# Patient Record
Sex: FEMALE | Race: ASIAN | NOT HISPANIC OR LATINO | URBAN - METROPOLITAN AREA
[De-identification: names, ages, dates, MRNs, and addresses within clinical notes are randomized per-mention and may not be internally consistent; named-entity substitution may affect disease eponyms.]

---

## 2017-11-05 ENCOUNTER — EMERGENCY (EMERGENCY)
Facility: HOSPITAL | Age: 82
LOS: 1 days | Discharge: HOME | End: 2017-11-05
Attending: EMERGENCY MEDICINE | Admitting: EMERGENCY MEDICINE
Payer: MEDICARE

## 2017-11-05 VITALS
DIASTOLIC BLOOD PRESSURE: 76 MMHG | SYSTOLIC BLOOD PRESSURE: 147 MMHG | HEART RATE: 73 BPM | RESPIRATION RATE: 18 BRPM | OXYGEN SATURATION: 96 % | TEMPERATURE: 98 F

## 2017-11-05 DIAGNOSIS — H26.9 UNSPECIFIED CATARACT: Chronic | ICD-10-CM

## 2017-11-05 LAB
ALBUMIN SERPL ELPH-MCNC: 3.9 G/DL — SIGNIFICANT CHANGE UP (ref 3.3–5)
ALP SERPL-CCNC: 90 U/L — SIGNIFICANT CHANGE UP (ref 40–120)
ALT FLD-CCNC: 27 U/L RC — SIGNIFICANT CHANGE UP (ref 10–45)
ANION GAP SERPL CALC-SCNC: 11 MMOL/L — SIGNIFICANT CHANGE UP (ref 5–17)
APPEARANCE UR: CLEAR — SIGNIFICANT CHANGE UP
AST SERPL-CCNC: 34 U/L — SIGNIFICANT CHANGE UP (ref 10–40)
BASOPHILS # BLD AUTO: 0.1 K/UL — SIGNIFICANT CHANGE UP (ref 0–0.2)
BASOPHILS NFR BLD AUTO: 1 % — SIGNIFICANT CHANGE UP (ref 0–2)
BILIRUB SERPL-MCNC: 0.4 MG/DL — SIGNIFICANT CHANGE UP (ref 0.2–1.2)
BILIRUB UR-MCNC: NEGATIVE — SIGNIFICANT CHANGE UP
BUN SERPL-MCNC: 13 MG/DL — SIGNIFICANT CHANGE UP (ref 7–23)
CALCIUM SERPL-MCNC: 9.3 MG/DL — SIGNIFICANT CHANGE UP (ref 8.4–10.5)
CHLORIDE SERPL-SCNC: 104 MMOL/L — SIGNIFICANT CHANGE UP (ref 96–108)
CO2 SERPL-SCNC: 24 MMOL/L — SIGNIFICANT CHANGE UP (ref 22–31)
COLOR SPEC: COLORLESS — SIGNIFICANT CHANGE UP
CREAT SERPL-MCNC: 0.69 MG/DL — SIGNIFICANT CHANGE UP (ref 0.5–1.3)
DIFF PNL FLD: ABNORMAL
EOSINOPHIL # BLD AUTO: 0.3 K/UL — SIGNIFICANT CHANGE UP (ref 0–0.5)
EOSINOPHIL NFR BLD AUTO: 2 % — SIGNIFICANT CHANGE UP (ref 0–6)
GLUCOSE SERPL-MCNC: 101 MG/DL — HIGH (ref 70–99)
GLUCOSE UR QL: NEGATIVE — SIGNIFICANT CHANGE UP
HCT VFR BLD CALC: 36.6 % — SIGNIFICANT CHANGE UP (ref 34.5–45)
HGB BLD-MCNC: 12.6 G/DL — SIGNIFICANT CHANGE UP (ref 11.5–15.5)
KETONES UR-MCNC: NEGATIVE — SIGNIFICANT CHANGE UP
LEUKOCYTE ESTERASE UR-ACNC: ABNORMAL
LYMPHOCYTES # BLD AUTO: 5.2 K/UL — HIGH (ref 1–3.3)
LYMPHOCYTES # BLD AUTO: 59 % — HIGH (ref 13–44)
MAGNESIUM SERPL-MCNC: 1.7 MG/DL — SIGNIFICANT CHANGE UP (ref 1.6–2.6)
MCHC RBC-ENTMCNC: 30.7 PG — SIGNIFICANT CHANGE UP (ref 27–34)
MCHC RBC-ENTMCNC: 34.5 GM/DL — SIGNIFICANT CHANGE UP (ref 32–36)
MCV RBC AUTO: 88.9 FL — SIGNIFICANT CHANGE UP (ref 80–100)
MONOCYTES # BLD AUTO: 0.6 K/UL — SIGNIFICANT CHANGE UP (ref 0–0.9)
MONOCYTES NFR BLD AUTO: 8 % — SIGNIFICANT CHANGE UP (ref 2–14)
NEUTROPHILS # BLD AUTO: 3.2 K/UL — SIGNIFICANT CHANGE UP (ref 1.8–7.4)
NEUTROPHILS NFR BLD AUTO: 29 % — LOW (ref 43–77)
NITRITE UR-MCNC: NEGATIVE — SIGNIFICANT CHANGE UP
PH UR: 6 — SIGNIFICANT CHANGE UP (ref 5–8)
PHOSPHATE SERPL-MCNC: 3.3 MG/DL — SIGNIFICANT CHANGE UP (ref 2.5–4.5)
PLATELET # BLD AUTO: 58 K/UL — LOW (ref 150–400)
POTASSIUM SERPL-MCNC: 4 MMOL/L — SIGNIFICANT CHANGE UP (ref 3.5–5.3)
POTASSIUM SERPL-SCNC: 4 MMOL/L — SIGNIFICANT CHANGE UP (ref 3.5–5.3)
PROT SERPL-MCNC: 7.7 G/DL — SIGNIFICANT CHANGE UP (ref 6–8.3)
PROT UR-MCNC: NEGATIVE — SIGNIFICANT CHANGE UP
RBC # BLD: 4.11 M/UL — SIGNIFICANT CHANGE UP (ref 3.8–5.2)
RBC # FLD: 12.8 % — SIGNIFICANT CHANGE UP (ref 10.3–14.5)
SODIUM SERPL-SCNC: 139 MMOL/L — SIGNIFICANT CHANGE UP (ref 135–145)
SP GR SPEC: <1.005 — LOW (ref 1.01–1.02)
TROPONIN T SERPL-MCNC: <0.01 NG/ML — SIGNIFICANT CHANGE UP (ref 0–0.06)
UROBILINOGEN FLD QL: NEGATIVE — SIGNIFICANT CHANGE UP
WBC # BLD: 9.3 K/UL — SIGNIFICANT CHANGE UP (ref 3.8–10.5)
WBC # FLD AUTO: 9.3 K/UL — SIGNIFICANT CHANGE UP (ref 3.8–10.5)

## 2017-11-05 PROCEDURE — 93010 ELECTROCARDIOGRAM REPORT: CPT

## 2017-11-05 PROCEDURE — 99220: CPT | Mod: 25

## 2017-11-05 PROCEDURE — 70450 CT HEAD/BRAIN W/O DYE: CPT | Mod: 26

## 2017-11-05 RX ORDER — SODIUM CHLORIDE 9 MG/ML
3 INJECTION INTRAMUSCULAR; INTRAVENOUS; SUBCUTANEOUS EVERY 8 HOURS
Qty: 0 | Refills: 0 | Status: DISCONTINUED | OUTPATIENT
Start: 2017-11-05 | End: 2017-11-11

## 2017-11-05 RX ORDER — SODIUM CHLORIDE 9 MG/ML
1000 INJECTION INTRAMUSCULAR; INTRAVENOUS; SUBCUTANEOUS ONCE
Qty: 0 | Refills: 0 | Status: COMPLETED | OUTPATIENT
Start: 2017-11-05 | End: 2017-11-05

## 2017-11-05 RX ORDER — ASPIRIN/CALCIUM CARB/MAGNESIUM 324 MG
324 TABLET ORAL ONCE
Qty: 0 | Refills: 0 | Status: DISCONTINUED | OUTPATIENT
Start: 2017-11-05 | End: 2017-11-05

## 2017-11-05 RX ORDER — ACETAMINOPHEN 500 MG
1000 TABLET ORAL ONCE
Qty: 0 | Refills: 0 | Status: COMPLETED | OUTPATIENT
Start: 2017-11-05 | End: 2017-11-05

## 2017-11-05 RX ORDER — LABETALOL HCL 100 MG
200 TABLET ORAL ONCE
Qty: 0 | Refills: 0 | Status: COMPLETED | OUTPATIENT
Start: 2017-11-05 | End: 2017-11-05

## 2017-11-05 RX ORDER — METOCLOPRAMIDE HCL 10 MG
10 TABLET ORAL ONCE
Qty: 0 | Refills: 0 | Status: COMPLETED | OUTPATIENT
Start: 2017-11-05 | End: 2017-11-05

## 2017-11-05 RX ADMIN — Medication 10 MILLIGRAM(S): at 17:23

## 2017-11-05 RX ADMIN — Medication 400 MILLIGRAM(S): at 17:23

## 2017-11-05 RX ADMIN — Medication 1000 MILLIGRAM(S): at 17:23

## 2017-11-05 RX ADMIN — SODIUM CHLORIDE 3 MILLILITER(S): 9 INJECTION INTRAMUSCULAR; INTRAVENOUS; SUBCUTANEOUS at 22:24

## 2017-11-05 RX ADMIN — SODIUM CHLORIDE 1000 MILLILITER(S): 9 INJECTION INTRAMUSCULAR; INTRAVENOUS; SUBCUTANEOUS at 16:19

## 2017-11-05 NOTE — ED PROVIDER NOTE - PROGRESS NOTE DETAILS
Yareli R2: CTH no acute findings.  Will likely order MRI and monitor pt in CDU.  Paged Neuro, awaiting call back. Yareli R2: Paged Neuro x3.  Still awaiting call back.  Spoke to CDU JIM Mosqueda, who will sign pt out to night JIM Tristan.  Pt to be taken to CDU for monitoring pending Neuro returning call. Yareli R2: Spoke to Neuro, will see patient.  MRI brain ordered.

## 2017-11-05 NOTE — ED CDU PROVIDER INITIAL DAY NOTE - OTHER FINDINGS
L anterior fascicular block, minimal voltage criteria for LVH may be normal variant, cannot r/o ant infarct, age undetermied

## 2017-11-05 NOTE — ED PROVIDER NOTE - PHYSICAL EXAMINATION
T(C): 36.4 (11-05-17 @ 14:47), Max: 36.4 (11-05-17 @ 14:47)  HR: 73 (11-05-17 @ 14:47) (73 - 73)  BP: 147/76 (11-05-17 @ 14:47) (147/76 - 147/76)  RR: 18 (11-05-17 @ 14:47) (18 - 18)  SpO2: 96% (11-05-17 @ 14:47) (96% - 96%)    Gen: awake, alert  HENT: neck soft / supple; MMM  Lymph: no LAD noted in neck  Eye: pupils constricted but reactive, sclerae anicteric  CV: normal rate, regular rhythm, crescendo-decrescendo systolic murmur noted  Pulm: CTAB, no w/r/r auscultated  Abd: +BS, soft, NT, ND  Skin: warm, dry  Ext: no LE edema  Neuro: CN II-XII intact, answering questions appropriately, following commands appropriately, recent and remote memory intact  Psych: normal mood / affect T(C): 36.4 (11-05-17 @ 14:47), Max: 36.4 (11-05-17 @ 14:47)  HR: 73 (11-05-17 @ 14:47) (73 - 73)  BP: 147/76 (11-05-17 @ 14:47) (147/76 - 147/76)  RR: 18 (11-05-17 @ 14:47) (18 - 18)  SpO2: 96% (11-05-17 @ 14:47) (96% - 96%)    Gen: awake, alert  HENT: neck soft / supple; MMM  Lymph: no LAD noted in neck  Eye: pupils constricted but reactive, sclerae anicteric  CV: normal rate, regular rhythm, crescendo-decrescendo systolic murmur noted  Pulm: CTAB, no w/r/r auscultated  Abd: +BS, soft, NT, ND  Skin: warm, dry  Ext: no LE edema  Neuro: CN II-XII intact, no nystagmus, answering questions appropriately, following commands appropriately, recent and remote memory intact  Psych: normal mood / affect

## 2017-11-05 NOTE — ED CDU PROVIDER INITIAL DAY NOTE - PROGRESS NOTE DETAILS
pt c/o chest discomfort, cannot describe pain, it is on the L side. states discomfort may be from the cardiac monitor and wants to know if it can come off. pt still has bra and clothes on over the monitor. NAD, VSS. CV: S1S2 RRR, Lungs: CTA b/l, +ttp of L chest. had pt change into gown, adjust monitor, do EKG and trop. after changing pt states pain resolved, doesn't want medication as she is feeling better. will continue to monitor. family present agree her discomfort is from the monitor and not the heart. will continue monitoring. -JIM Stephenson

## 2017-11-05 NOTE — CONSULT NOTE ADULT - SUBJECTIVE AND OBJECTIVE BOX
HPI:  The patient is a 83yo F with h/o HTN, cervical spinal stenosis with BL hand numbness, arthritis, here for positional x 1 week, has Hx of vertigo in the past. pt accompanied by her son, who provided translation.  Five weeks ago, the patient had a barium swallow test, during which time the patient had her first episode of vertigo.  Since then, the patient has had episodes of vertigo, particularly when she is standing or moving her head to different directions.  The episodes come and go but are becoming more frequent, per son.  She visited her PCP who prescribed her Meclizine, which was helping her at the begining. The patient also reports a headache that moves around her head.  The patient was taken to urgent care today and was negative for flu.  Denies any slurred speech, changes in vision, changes in her balance.    MEDICATIONS  (STANDING):  labetalol 200 milliGRAM(s) Oral once  sodium chloride 0.9% lock flush 3 milliLiter(s) IV Push every 8 hours    MEDICATIONS  (PRN):    PAST MEDICAL & SURGICAL HISTORY:  Arthritis  Spinal stenosis  Hypertension  Bilateral cataracts: 2012    FAMILY HISTORY:  Family history of heart attack (Mother)  Family history of stroke (Father, Sibling)    Allergies    Allergy Status Unknown    Intolerances    SHx - No smoking, No ETOH, No drug abuse    Review of Systems:  CONSTITUTIONAL:  No weight loss, fever, chills, weakness or fatigue.  HEENT:  Eyes:  No visual loss, blurred vision, double vision or yellow sclerae. Ears, Nose, Throat:  No hearing loss, sneezing, congestion, runny nose or sore throat.  CARDIOVASCULAR:  No chest pain, chest pressure or chest discomfort. No palpitations or edema.  GASTROINTESTINAL:  No anorexia, nausea, vomiting or diarrhea. No abdominal pain or blood.  NEUROLOGICAL: See HPI  MUSCULOSKELETAL:  No muscle, back pain, joint pain or stiffness.  PSYCHIATRIC:  No history of depression or anxiety.      Vital Signs Last 24 Hrs  T(C): 36.5 (05 Nov 2017 20:57), Max: 37.1 (05 Nov 2017 18:36)  T(F): 97.7 (05 Nov 2017 20:57), Max: 98.7 (05 Nov 2017 18:36)  HR: 64 (05 Nov 2017 20:57) (64 - 73)  BP: 158/71 (05 Nov 2017 20:57) (140/71 - 163/77)  BP(mean): --  RR: 16 (05 Nov 2017 20:57) (16 - 18)  SpO2: 97% (05 Nov 2017 20:57) (96% - 97%)    General Exam:   General appearance: No acute distress                   Neurological Exam:  Mental Status: Orientated to self, date and place.    No dysarthria, aphasia or neglect.      Cranial Nerves: CN I - not tested.  PERRL, EOMI, VFF, no nystagmus or diplopia.  No APD.    Fundi not visualized bilaterally.    No facial asymmetry.  Hearing intact to finger rub bilaterally.     Motor:   Tone: normal.                  Strength:     [] Upper extremity                      Delt       Bicep    Tricep                                                           R         5/5        5/5        5/5       5/5                                                L          5/5        5/5        5/5       5/5  [] Lower extremity                       HF          KE          KF        DF         PF                                               R        5/5        5/5        5/5       5/5       5/5                                               L         5/5        5/5       5/5       5/5        5/5  Pronator drift: none                 Dysmetria: BL NL FTN  No truncal ataxia.    Tremor: No resting, postural or action tremor.  No myoclonus.    Sensation: intact to light touch, pinprick, vibration and proprioception    Deep Tendon Reflexes:   Right 2+ : BC, TC, BRD   Left 2+ : BC, TC, BRD  Right 2+ Knee, 1+ ankle  Left 2+ Knee, 1+ ankle    Toes flexor bilaterally  Gait: normal and stable.      Other:    11-05    139  |  104  |  13  ----------------------------<  101<H>  4.0   |  24  |  0.69    Ca    9.3      05 Nov 2017 16:04  Phos  3.3     11-05  Mg     1.7     11-05    TPro  7.7  /  Alb  3.9  /  TBili  0.4  /  DBili  x   /  AST  34  /  ALT  27  /  AlkPhos  90  11-05                          12.6   9.3   )-----------( 58       ( 05 Nov 2017 16:04 )             36.6       Radiology  < from: CT Head No Cont (11.05.17 @ 17:01) >  IMPRESSION:   No CT evidence of acute intracranial hemorrhage, mass or infarct.

## 2017-11-05 NOTE — ED PROVIDER NOTE - OBJECTIVE STATEMENT
82F  here for dizziness. 82F h/o HTN, arthritis here for dizziness.    5 weeks ago, the patient had a barium swallow test, during which time the patient had vertigo.  Since then, the patient has had episodes of vertigo.  The patient saw her PMD, who gave her meclizine.  The patient reports a headache that moves around her head.  The patient was taken to urgent care today and was negative for flu.  The patient has also been taking cinnarizine.  The patient fell off of her sofa last week and fall on her left side.  Denies changes in vision or hearing, fever, chills, abdominal pain, chest pain, SOB, dysuria, N/V/C.  She has chronic diarrhea when she eats.    PMD: Kenna Felipe 82-yo Malay-speaking F h/o HTN, arthritis here for dizziness.    Five weeks ago, the patient had a barium swallow test, during which time the patient had vertigo.  Since then, the patient has had episodes of vertigo when she is standing.  The episodes come and go but are becoming more frequent, per son.  The patient reports a headache that moves around her head.  The patient was taken to urgent care today and was negative for flu.  The patient has also been taking cinnarizine.  The patient fell off of her sofa last week and fall on her left side, denies LOC or head trauma.  Denies changes in vision or hearing, fever, chills, abdominal pain, chest pain, SOB, dysuria, hematuria, N/V/C.  She has chronic diarrhea when she eats and is undergoing workup with her gastroenterologist re: this.  The patient saw her PMD, who gave her meclizine.  She states that the meclizine initially helped but now is no longer helping.    PMD: Kenna Felipe 82-yo Japanese-speaking F h/o HTN, arthritis here for dizziness.    Patient is Japanese-speaking.  She is accompanied by her son, who provided translation.  Five weeks ago, the patient had a barium swallow test, during which time the patient had her first episode of vertigo.  Since then, the patient has had episodes of vertigo, particularly when she is standing.  The episodes come and go but are becoming more frequent, per son.  The patient reports a headache that moves around her head.  The patient was taken to urgent care today and was negative for flu.  The patient has also been taking cinnarizine.  The patient fell off of her sofa last week and fall on her left side, denies LOC or head trauma.  Denies changes in vision or hearing, fever, chills, abdominal pain, chest pain, SOB, dysuria, hematuria, N/V/C.  She has chronic diarrhea when she eats and is undergoing workup with her gastroenterologist re: this.  The patient saw her PMD, who gave her meclizine.  She states that the meclizine initially helped but now is no longer helping.    PMD: Kenna Felipe

## 2017-11-05 NOTE — ED ADULT NURSE REASSESSMENT NOTE - NS ED NURSE REASSESS COMMENT FT1
Pt ambulated to bathroom without difficulty, denies chest pain at this time. Family at bedside. Will monitor and assess.

## 2017-11-05 NOTE — ED CDU PROVIDER INITIAL DAY NOTE - DETAILS
- frequent re-eval  - vitals q 4hrs  - tele  - neurochecks q 4hrs  - MRI head  - neuro following  - case discussed with attending Dr. Uribe (covering for Dr. Pratt)

## 2017-11-05 NOTE — ED ADULT NURSE NOTE - OBJECTIVE STATEMENT
82 yr old female with a h/o vertigo came in with dizziness and headache, states meclizine is not working. on assessment a and o x 3 lungs clear abd soft non thender no swelling in extremities no n/v/d/ no fevers/ pt also recently has the flu last week

## 2017-11-05 NOTE — ED ADULT NURSE REASSESSMENT NOTE - NS ED NURSE REASSESS COMMENT FT1
Report received from Marcie Dietz RN. Patient baseline mental status, son at bedside translatingkatherine  service. Vital signs performed. Patient waiting to take pm dose of labetalol with food. Report given to Nilsa BONE in CDU. Report received from Marcie Dietz RN. Patient baseline mental status, son at bedside translatingkatherine  service. Vital signs performed. Patient waiting to take pm dose of labetalol with food. Report given to Nilsa BONE in CDU. Pending MRI

## 2017-11-05 NOTE — ED PROVIDER NOTE - MEDICAL DECISION MAKING DETAILS
Yareli R2: 82F hx as above here with dizziness, no LOC, no trauma.  Will check electrolytes.  Consider imaging. Yareli R2: 82F hx as above here with dizziness, no LOC, no trauma.  Will check electrolytes.  Consider imaging.  Terence: Patient with vertigo x 5 weeks, episodic and worse with head movement lasting a few seconds. States occurring more frequently. Patient reports mild headache.  on meclizine and states minimal relief. No prior episodes. no focal deficits, finger to nose intact, ambulating with no difficulty, will get labs, ct head, valium, reassess for possible MRI/cdu.

## 2017-11-05 NOTE — ED CDU PROVIDER INITIAL DAY NOTE - OBJECTIVE STATEMENT
81yo Latvian-speaking F h/o HTN, spinal stenosis, arthritis, here for dizziness x 1 week. pt accompanied by her son, who provided translation.  Five weeks ago, the patient had a barium swallow test, during which time the patient had her first episode of vertigo.  Since then, the patient has had episodes of vertigo, particularly when she is standing.  The episodes come and go but are becoming more frequent, per son.  The patient reports a headache that moves around her head.  The patient was taken to urgent care today and was negative for flu.  The patient has also been taking cinnarizine.  The patient fell off of her sofa last week and fall on her left side, denies LOC or head trauma.  Denies changes in vision or hearing, fever, chills, abdominal pain, chest pain, SOB, dysuria, hematuria, N/V/C.  denies weakness, numbness, H/A, slurred speech. She has chronic diarrhea when she eats and is undergoing workup with her gastroenterologist re: this.  The patient saw her PMD, who gave her meclizine.  She states that the meclizine initially helped but now is no longer helping.    PMD: Kenna Felipe

## 2017-11-05 NOTE — ED CDU PROVIDER INITIAL DAY NOTE - ATTENDING CONTRIBUTION TO CARE
I have personally performed a face to face diagnostic evaluation on this patient.  I have reviewed the ACP note and agree with the history, exam, and plan of care, except as noted.  History and Exam by me shows  see ER note for details.

## 2017-11-05 NOTE — CONSULT NOTE ADULT - ASSESSMENT
The patient is a 83yo F with h/o HTN, cervical spinal stenosis with BL hand numbness, arthritis, here for positional x 5 week. Visited her PCP who prescribed her Meclizine which intially helped her, however from a week ago her vertigo is restarted. Precipitates by head and eye movement. She also describes a holocephalic headache for the past week. Exam is neurologically intact except BL UE radiculopathy.  Impression: Positional vertigo is most likely the diagnosis, the patient is already scheduled to stay in CDU for MRI, will follow up this am with neurology attending  Plan:  [] MRI H non contrast  [] MRA H&N  [] D/W Dr. Fam

## 2017-11-05 NOTE — ED ADULT NURSE REASSESSMENT NOTE - NS ED NURSE REASSESS COMMENT FT1
Pt report received from Anusha BONE. Pt transferred to cdu for observation for dizziness, increased with head movement. Pt Sinhala speaking,  at the bedside translating. Pt denies n/v/d, states she hasn't eaten all day and requests a meal. Tele monitor in place HR 67 bpm NSR. Pt denies dizziness at this time, VSS, afebrile. Awaiting MRI. Aware of plan of care, safety maintained. Pt report received from Anusha BONE. Pt transferred to cdu for observation for dizziness, increased with head movement. Pt Korean speaking,  at the bedside translating. Pt denies chest pain, palpitations, sob, dyspnea no n/v/d, states she hasn't eaten all day and requests a meal.  is getting Halal/kosher meal for her. Tele monitor in place HR 67 bpm NSR. Pt denies dizziness at this time, VSS, afebrile. Awaiting MRI. Aware of plan of care, safety maintained.

## 2017-11-05 NOTE — ED ADULT NURSE REASSESSMENT NOTE - NS ED NURSE REASSESS COMMENT FT1
Pt c/o 5/10 chest pain left sided, and headache. As per family [facility] leads are uncomfortable. Requested female nurse to perform ekg, Ed tech sent. Will monitor and assess.

## 2017-11-06 VITALS
RESPIRATION RATE: 20 BRPM | HEART RATE: 72 BPM | OXYGEN SATURATION: 98 % | TEMPERATURE: 98 F | DIASTOLIC BLOOD PRESSURE: 68 MMHG | SYSTOLIC BLOOD PRESSURE: 177 MMHG

## 2017-11-06 PROBLEM — Z00.00 ENCOUNTER FOR PREVENTIVE HEALTH EXAMINATION: Status: ACTIVE | Noted: 2017-11-06

## 2017-11-06 LAB
CHOLEST SERPL-MCNC: 167 MG/DL — SIGNIFICANT CHANGE UP (ref 10–199)
CULTURE RESULTS: NO GROWTH — SIGNIFICANT CHANGE UP
HBA1C BLD-MCNC: 6 % — HIGH (ref 4–5.6)
HDLC SERPL-MCNC: 26 MG/DL — LOW (ref 40–125)
LIPID PNL WITH DIRECT LDL SERPL: 99 MG/DL — SIGNIFICANT CHANGE UP
SPECIMEN SOURCE: SIGNIFICANT CHANGE UP
TOTAL CHOLESTEROL/HDL RATIO MEASUREMENT: 6.4 RATIO — SIGNIFICANT CHANGE UP (ref 3.3–7.1)
TRIGL SERPL-MCNC: 212 MG/DL — HIGH (ref 10–149)
TROPONIN T SERPL-MCNC: <0.01 NG/ML — SIGNIFICANT CHANGE UP (ref 0–0.06)

## 2017-11-06 PROCEDURE — 84484 ASSAY OF TROPONIN QUANT: CPT

## 2017-11-06 PROCEDURE — 70544 MR ANGIOGRAPHY HEAD W/O DYE: CPT

## 2017-11-06 PROCEDURE — 84100 ASSAY OF PHOSPHORUS: CPT

## 2017-11-06 PROCEDURE — 93010 ELECTROCARDIOGRAM REPORT: CPT | Mod: 76

## 2017-11-06 PROCEDURE — 96375 TX/PRO/DX INJ NEW DRUG ADDON: CPT | Mod: XU

## 2017-11-06 PROCEDURE — 99284 EMERGENCY DEPT VISIT MOD MDM: CPT | Mod: 25

## 2017-11-06 PROCEDURE — A9585: CPT

## 2017-11-06 PROCEDURE — 70544 MR ANGIOGRAPHY HEAD W/O DYE: CPT | Mod: 26,59

## 2017-11-06 PROCEDURE — 83735 ASSAY OF MAGNESIUM: CPT

## 2017-11-06 PROCEDURE — 70549 MR ANGIOGRAPH NECK W/O&W/DYE: CPT | Mod: 26

## 2017-11-06 PROCEDURE — 93005 ELECTROCARDIOGRAM TRACING: CPT

## 2017-11-06 PROCEDURE — 80053 COMPREHEN METABOLIC PANEL: CPT

## 2017-11-06 PROCEDURE — 87086 URINE CULTURE/COLONY COUNT: CPT

## 2017-11-06 PROCEDURE — 83036 HEMOGLOBIN GLYCOSYLATED A1C: CPT

## 2017-11-06 PROCEDURE — 85027 COMPLETE CBC AUTOMATED: CPT

## 2017-11-06 PROCEDURE — G0378: CPT

## 2017-11-06 PROCEDURE — 70548 MR ANGIOGRAPHY NECK W/DYE: CPT

## 2017-11-06 PROCEDURE — 70450 CT HEAD/BRAIN W/O DYE: CPT

## 2017-11-06 PROCEDURE — 70551 MRI BRAIN STEM W/O DYE: CPT | Mod: 26

## 2017-11-06 PROCEDURE — 70551 MRI BRAIN STEM W/O DYE: CPT

## 2017-11-06 PROCEDURE — 80061 LIPID PANEL: CPT

## 2017-11-06 PROCEDURE — 99217: CPT | Mod: 25

## 2017-11-06 PROCEDURE — 81001 URINALYSIS AUTO W/SCOPE: CPT

## 2017-11-06 PROCEDURE — 96374 THER/PROPH/DIAG INJ IV PUSH: CPT

## 2017-11-06 RX ORDER — LOSARTAN POTASSIUM 100 MG/1
100 TABLET, FILM COATED ORAL DAILY
Qty: 0 | Refills: 0 | Status: DISCONTINUED | OUTPATIENT
Start: 2017-11-06 | End: 2017-11-11

## 2017-11-06 RX ORDER — MECLIZINE HCL 12.5 MG
1 TABLET ORAL
Qty: 10 | Refills: 0 | OUTPATIENT
Start: 2017-11-06 | End: 2017-11-11

## 2017-11-06 RX ORDER — ASPIRIN/CALCIUM CARB/MAGNESIUM 324 MG
81 TABLET ORAL DAILY
Qty: 0 | Refills: 0 | Status: DISCONTINUED | OUTPATIENT
Start: 2017-11-06 | End: 2017-11-11

## 2017-11-06 RX ADMIN — LOSARTAN POTASSIUM 100 MILLIGRAM(S): 100 TABLET, FILM COATED ORAL at 06:03

## 2017-11-06 RX ADMIN — SODIUM CHLORIDE 3 MILLILITER(S): 9 INJECTION INTRAMUSCULAR; INTRAVENOUS; SUBCUTANEOUS at 05:20

## 2017-11-06 RX ADMIN — Medication 81 MILLIGRAM(S): at 06:36

## 2017-11-06 NOTE — ED CDU PROVIDER SUBSEQUENT DAY NOTE - ATTENDING CONTRIBUTION TO CARE
Attending MD Clay:   I personally have seen and examined this patient.  Physician assistant note reviewed and agree on plan of care and except where noted.  See HPI, PE, and MDM for details.

## 2017-11-06 NOTE — ED CDU PROVIDER SUBSEQUENT DAY NOTE - PROGRESS NOTE DETAILS
CDU progress note JIM Stephenson: Pt previously reported episode of vertigo while looking down, then it resolved. Patient sleeping comfortably now. NAD. VSS. no events on telemetry. Patient resting comfortably in bed, NAD, VSS, no events on tele, symptoms stable. Currently being transported off unit to MRI. Karlo Gomez PA-C. Patient feeling improved, NAD, VSS, no events on tele. MRI demonstrates no acute abnormalities, neurology aware of results, patient was seen by attending. Stable for discharge home, discussed w/ Dr. Clay. Karlo Gomez PA-C. stable for DC home, MRI wnl, cleared for DC home by neurology

## 2017-11-06 NOTE — ED CDU PROVIDER DISPOSITION NOTE - CLINICAL COURSE
83yo Occitan-speaking F h/o HTN, spinal stenosis, arthritis, here for dizziness x 1 week. Five weeks ago, the patient had a barium swallow test, during which time the patient had her first episode of vertigo.  Since then, the patient has had episodes of vertigo, particularly when she is standing.  The episodes come and go but are becoming more frequent, per son.  The patient reports a headache that moves around her head.  The patient was taken to urgent care today and was negative for flu. The patient fell off of her sofa last week and fall on her left side, denies LOC or head trauma. The patient saw her PMD, who gave her meclizine. The patient has also been taking cinnarizine. She states that the meclizine initially helped but now is no longer helping. neurology evaluated pt, likely benign positional vertigo, but want to r/o stroke, recommend MRI/MRA head and neck. Pt also c/o intermittent CP 2/2 telemetry monitor. Radha negative. MRI/MRA _____. Attending MD Clay: I have personally performed a face to face diagnostic evaluation on this patient.  I have reviewed the ACP note and agree with the history, exam, and plan of care, except as noted.      82F observed overnight for vertigo, rule out central etiology of vertigo. MR brain revealed no acute infarct, neurology service evaluated patient and agreed vertigo likely peripheral in nature. ACS ruled out with cardiac biomarkers x 2. Patient stable for discharge with PMD and outpatient neuro follow up

## 2017-11-06 NOTE — ED CDU PROVIDER DISPOSITION NOTE - PLAN OF CARE
1. Continue your home medications as directed. Start Aspirin 81mg daily until further evaluation. Start Atorvastatin 40mg daily.   2. Drink plenty of fluids to stay hydrated.  3. You will need to follow up with your PMD and our neurology clinic at [phone] in 2-3 days. Recommend follow up with cardiology [phone]. A copy of your results were given to you to bring to your appt.   4. Return to ER for headache, confusion, behavior/speech changes, numbness/tingling/weakness in your arms/legs, or any other concerns. 1. Continue your home medications as directed.   2. Drink plenty of fluids to stay hydrated.  3. You will need to follow up with your PMD and our neurology clinic at [phone] in 2-3 days. Recommend follow up with cardiology [phone]. A copy of your results were given to you to bring to your appt.   4. Return to ER for headache, confusion, behavior/speech changes, numbness/tingling/weakness in your arms/legs, or any other concerns.

## 2017-11-06 NOTE — ED CDU PROVIDER SUBSEQUENT DAY NOTE - HISTORY
No interval changes since initial CDU provider note. Pt resting comfortably, sleeping but after pt was woken up for exam she c/o mild L CP again, states it is from the monitor again, declined medication for pain again. previous trop negative. NAD VSS. no events on tele. CV: S1S2 RRR, Lungs: CTA b/l, neuro: no deficits. pt due for another trop and EKG at 0400. will continue monitoring - JIM Stephenson

## 2017-11-06 NOTE — ED CDU PROVIDER SUBSEQUENT DAY NOTE - FAMILY HISTORY
Father  Still living? Unknown  Family history of stroke, Age at diagnosis: Age Unknown     Sibling  Still living? Unknown  Family history of stroke, Age at diagnosis: Age Unknown     Mother  Still living? Unknown  Family history of heart attack, Age at diagnosis: Age Unknown

## 2017-12-09 ENCOUNTER — APPOINTMENT (OUTPATIENT)
Dept: OTOLARYNGOLOGY | Facility: CLINIC | Age: 82
End: 2017-12-09

## 2018-02-05 ENCOUNTER — APPOINTMENT (OUTPATIENT)
Dept: GASTROENTEROLOGY | Facility: CLINIC | Age: 83
End: 2018-02-05
Payer: MEDICARE

## 2018-02-05 DIAGNOSIS — Z87.39 PERSONAL HISTORY OF OTHER DISEASES OF THE MUSCULOSKELETAL SYSTEM AND CONNECTIVE TISSUE: ICD-10-CM

## 2018-02-05 DIAGNOSIS — K80.20 CALCULUS OF GALLBLADDER W/OUT CHOLECYSTITIS W/OUT OBSTRUCTION: ICD-10-CM

## 2018-02-05 DIAGNOSIS — R10.13 EPIGASTRIC PAIN: ICD-10-CM

## 2018-02-05 DIAGNOSIS — R11.0 NAUSEA: ICD-10-CM

## 2018-02-05 DIAGNOSIS — Z82.3 FAMILY HISTORY OF STROKE: ICD-10-CM

## 2018-02-05 DIAGNOSIS — Z78.9 OTHER SPECIFIED HEALTH STATUS: ICD-10-CM

## 2018-02-05 DIAGNOSIS — Z87.898 PERSONAL HISTORY OF OTHER SPECIFIED CONDITIONS: ICD-10-CM

## 2018-02-05 DIAGNOSIS — K52.9 NONINFECTIVE GASTROENTERITIS AND COLITIS, UNSPECIFIED: ICD-10-CM

## 2018-02-05 DIAGNOSIS — R13.10 DYSPHAGIA, UNSPECIFIED: ICD-10-CM

## 2018-02-05 DIAGNOSIS — Z86.79 PERSONAL HISTORY OF OTHER DISEASES OF THE CIRCULATORY SYSTEM: ICD-10-CM

## 2018-02-05 PROCEDURE — 99204 OFFICE O/P NEW MOD 45 MIN: CPT

## 2018-02-05 RX ORDER — OMEPRAZOLE 40 MG/1
40 CAPSULE, DELAYED RELEASE ORAL
Qty: 30 | Refills: 3 | Status: ACTIVE | COMMUNITY
Start: 2018-02-05 | End: 1900-01-01

## 2018-02-06 PROBLEM — Z78.9 DOES NOT USE TOBACCO: Status: ACTIVE | Noted: 2018-02-06

## 2018-02-06 PROBLEM — Z87.898 HISTORY OF VERTIGO: Status: RESOLVED | Noted: 2018-02-06 | Resolved: 2018-02-06

## 2018-02-06 PROBLEM — Z87.39 HISTORY OF ARTHRITIS: Status: RESOLVED | Noted: 2018-02-06 | Resolved: 2018-02-06

## 2018-02-06 PROBLEM — Z86.79 HISTORY OF HYPERTENSION: Status: RESOLVED | Noted: 2018-02-06 | Resolved: 2018-02-06

## 2018-02-06 PROBLEM — R13.10 DYSPHAGIA: Status: ACTIVE | Noted: 2018-02-06

## 2018-02-06 PROBLEM — R11.0 NAUSEA: Status: ACTIVE | Noted: 2018-02-05

## 2018-02-06 PROBLEM — K52.9 CHRONIC DIARRHEA: Status: ACTIVE | Noted: 2018-02-05

## 2018-02-06 PROBLEM — Z82.3 FAMILY HISTORY OF CEREBROVASCULAR ACCIDENT (CVA): Status: ACTIVE | Noted: 2018-02-06

## 2018-02-06 PROBLEM — K80.20 CHOLELITHIASIS: Status: ACTIVE | Noted: 2018-02-06

## 2018-02-06 RX ORDER — VALSARTAN 160 MG/1
160 TABLET, COATED ORAL
Qty: 180 | Refills: 0 | Status: ACTIVE | COMMUNITY
Start: 2017-11-09

## 2018-02-06 RX ORDER — LABETALOL HYDROCHLORIDE 200 MG/1
200 TABLET, FILM COATED ORAL
Qty: 270 | Refills: 0 | Status: ACTIVE | COMMUNITY
Start: 2017-08-10

## 2018-02-12 ENCOUNTER — APPOINTMENT (OUTPATIENT)
Dept: RADIOLOGY | Facility: HOSPITAL | Age: 83
End: 2018-02-12

## 2018-04-19 ENCOUNTER — INPATIENT (INPATIENT)
Facility: HOSPITAL | Age: 83
LOS: 1 days | Discharge: HOME | DRG: 871 | End: 2018-04-21
Attending: INTERNAL MEDICINE | Admitting: INTERNAL MEDICINE
Payer: MEDICARE

## 2018-04-19 VITALS
DIASTOLIC BLOOD PRESSURE: 90 MMHG | HEART RATE: 88 BPM | RESPIRATION RATE: 33 BRPM | SYSTOLIC BLOOD PRESSURE: 116 MMHG | OXYGEN SATURATION: 94 %

## 2018-04-19 DIAGNOSIS — I10 ESSENTIAL (PRIMARY) HYPERTENSION: ICD-10-CM

## 2018-04-19 DIAGNOSIS — A41.9 SEPSIS, UNSPECIFIED ORGANISM: ICD-10-CM

## 2018-04-19 DIAGNOSIS — J12.9 VIRAL PNEUMONIA, UNSPECIFIED: ICD-10-CM

## 2018-04-19 DIAGNOSIS — R10.84 GENERALIZED ABDOMINAL PAIN: ICD-10-CM

## 2018-04-19 DIAGNOSIS — J96.00 ACUTE RESPIRATORY FAILURE, UNSPECIFIED WHETHER WITH HYPOXIA OR HYPERCAPNIA: ICD-10-CM

## 2018-04-19 DIAGNOSIS — D69.6 THROMBOCYTOPENIA, UNSPECIFIED: ICD-10-CM

## 2018-04-19 DIAGNOSIS — Z29.9 ENCOUNTER FOR PROPHYLACTIC MEASURES, UNSPECIFIED: ICD-10-CM

## 2018-04-19 DIAGNOSIS — R06.09 OTHER FORMS OF DYSPNEA: ICD-10-CM

## 2018-04-19 DIAGNOSIS — H26.9 UNSPECIFIED CATARACT: Chronic | ICD-10-CM

## 2018-04-19 LAB
ALBUMIN SERPL ELPH-MCNC: 3.7 G/DL — SIGNIFICANT CHANGE UP (ref 3.3–5)
ALP SERPL-CCNC: 102 U/L — SIGNIFICANT CHANGE UP (ref 40–120)
ALT FLD-CCNC: 21 U/L — SIGNIFICANT CHANGE UP (ref 10–45)
ANION GAP SERPL CALC-SCNC: 15 MMOL/L — SIGNIFICANT CHANGE UP (ref 5–17)
APPEARANCE UR: CLEAR — SIGNIFICANT CHANGE UP
APTT BLD: 23.2 SEC — LOW (ref 27.5–37.4)
AST SERPL-CCNC: 33 U/L — SIGNIFICANT CHANGE UP (ref 10–40)
BASE EXCESS BLDV CALC-SCNC: 1.9 MMOL/L — SIGNIFICANT CHANGE UP (ref -2–2)
BASOPHILS # BLD AUTO: 0 K/UL — SIGNIFICANT CHANGE UP (ref 0–0.2)
BILIRUB SERPL-MCNC: 0.6 MG/DL — SIGNIFICANT CHANGE UP (ref 0.2–1.2)
BILIRUB UR-MCNC: NEGATIVE — SIGNIFICANT CHANGE UP
BUN SERPL-MCNC: 12 MG/DL — SIGNIFICANT CHANGE UP (ref 7–23)
CA-I SERPL-SCNC: SIGNIFICANT CHANGE UP MMOL/L (ref 1.12–1.3)
CALCIUM SERPL-MCNC: 9.4 MG/DL — SIGNIFICANT CHANGE UP (ref 8.4–10.5)
CHLORIDE BLDV-SCNC: SIGNIFICANT CHANGE UP MMOL/L (ref 96–108)
CHLORIDE SERPL-SCNC: 103 MMOL/L — SIGNIFICANT CHANGE UP (ref 96–108)
CK MB CFR SERPL CALC: 2.6 NG/ML — SIGNIFICANT CHANGE UP (ref 0–3.8)
CK SERPL-CCNC: 65 U/L — SIGNIFICANT CHANGE UP (ref 25–170)
CO2 BLDV-SCNC: 27 MMOL/L — SIGNIFICANT CHANGE UP (ref 22–30)
CO2 SERPL-SCNC: 22 MMOL/L — SIGNIFICANT CHANGE UP (ref 22–31)
COLOR SPEC: YELLOW — SIGNIFICANT CHANGE UP
CREAT SERPL-MCNC: 0.54 MG/DL — SIGNIFICANT CHANGE UP (ref 0.5–1.3)
DIFF PNL FLD: ABNORMAL
EOSINOPHIL # BLD AUTO: 0 K/UL — SIGNIFICANT CHANGE UP (ref 0–0.5)
EPI CELLS # UR: SIGNIFICANT CHANGE UP /HPF
FLUBV RNA SPEC QL NAA+PROBE: DETECTED
GAS PNL BLDV: SIGNIFICANT CHANGE UP
GAS PNL BLDV: SIGNIFICANT CHANGE UP
GAS PNL BLDV: SIGNIFICANT CHANGE UP MMOL/L (ref 136–145)
GLUCOSE BLDV-MCNC: 109 MG/DL — HIGH (ref 70–99)
GLUCOSE SERPL-MCNC: 108 MG/DL — HIGH (ref 70–99)
GLUCOSE UR QL: NEGATIVE — SIGNIFICANT CHANGE UP
HCO3 BLDV-SCNC: 26 MMOL/L — SIGNIFICANT CHANGE UP (ref 21–29)
HCT VFR BLD CALC: 37.9 % — SIGNIFICANT CHANGE UP (ref 34.5–45)
HCT VFR BLDA CALC: 38 % — LOW (ref 39–50)
HGB BLD CALC-MCNC: 12.5 G/DL — SIGNIFICANT CHANGE UP (ref 11.5–15.5)
HGB BLD-MCNC: 13.2 G/DL — SIGNIFICANT CHANGE UP (ref 11.5–15.5)
INR BLD: 1 RATIO — SIGNIFICANT CHANGE UP (ref 0.88–1.16)
KETONES UR-MCNC: NEGATIVE — SIGNIFICANT CHANGE UP
LACTATE BLDV-MCNC: 2.5 MMOL/L — HIGH (ref 0.7–2)
LEUKOCYTE ESTERASE UR-ACNC: NEGATIVE — SIGNIFICANT CHANGE UP
LG PLATELETS BLD QL AUTO: SLIGHT — SIGNIFICANT CHANGE UP
LYMPHOCYTES # BLD AUTO: 47 % — HIGH (ref 13–44)
LYMPHOCYTES # BLD AUTO: 6.7 K/UL — HIGH (ref 1–3.3)
MCHC RBC-ENTMCNC: 30.1 PG — SIGNIFICANT CHANGE UP (ref 27–34)
MCHC RBC-ENTMCNC: 34.7 GM/DL — SIGNIFICANT CHANGE UP (ref 32–36)
MCV RBC AUTO: 86.8 FL — SIGNIFICANT CHANGE UP (ref 80–100)
METAMYELOCYTES # FLD: 1 % — HIGH (ref 0–0)
MONOCYTES # BLD AUTO: 0.8 K/UL — SIGNIFICANT CHANGE UP (ref 0–0.9)
MONOCYTES NFR BLD AUTO: 11 % — SIGNIFICANT CHANGE UP (ref 2–14)
NEUTROPHILS # BLD AUTO: 6 K/UL — SIGNIFICANT CHANGE UP (ref 1.8–7.4)
NEUTROPHILS NFR BLD AUTO: 39 % — LOW (ref 43–77)
NEUTS BAND # BLD: 1 % — SIGNIFICANT CHANGE UP (ref 0–8)
NITRITE UR-MCNC: NEGATIVE — SIGNIFICANT CHANGE UP
NT-PROBNP SERPL-SCNC: 328 PG/ML — HIGH (ref 0–300)
OTHER CELLS CSF MANUAL: 12 ML/DL — LOW (ref 18–22)
PCO2 BLDV: 41 MMHG — SIGNIFICANT CHANGE UP (ref 35–50)
PH BLDV: 7.42 — SIGNIFICANT CHANGE UP (ref 7.35–7.45)
PH UR: 6.5 — SIGNIFICANT CHANGE UP (ref 5–8)
PLAT MORPH BLD: NORMAL — SIGNIFICANT CHANGE UP
PLATELET # BLD AUTO: 64 K/UL — LOW (ref 150–400)
PO2 BLDV: 39 MMHG — SIGNIFICANT CHANGE UP (ref 25–45)
POTASSIUM BLDV-SCNC: SIGNIFICANT CHANGE UP MMOL/L (ref 3.5–5)
POTASSIUM SERPL-MCNC: 3.8 MMOL/L — SIGNIFICANT CHANGE UP (ref 3.5–5.3)
POTASSIUM SERPL-SCNC: 3.8 MMOL/L — SIGNIFICANT CHANGE UP (ref 3.5–5.3)
PROT SERPL-MCNC: 7.8 G/DL — SIGNIFICANT CHANGE UP (ref 6–8.3)
PROT UR-MCNC: 30 MG/DL
PROTHROM AB SERPL-ACNC: 10.8 SEC — SIGNIFICANT CHANGE UP (ref 9.8–12.7)
RAPID RVP RESULT: DETECTED
RBC # BLD: 4.37 M/UL — SIGNIFICANT CHANGE UP (ref 3.8–5.2)
RBC # FLD: 14.1 % — SIGNIFICANT CHANGE UP (ref 10.3–14.5)
RBC BLD AUTO: SIGNIFICANT CHANGE UP
SAO2 % BLDV: 70 % — SIGNIFICANT CHANGE UP (ref 67–88)
SODIUM SERPL-SCNC: 140 MMOL/L — SIGNIFICANT CHANGE UP (ref 135–145)
SP GR SPEC: 1.01 — SIGNIFICANT CHANGE UP (ref 1.01–1.02)
TROPONIN T SERPL-MCNC: <0.01 NG/ML — SIGNIFICANT CHANGE UP (ref 0–0.06)
TROPONIN T SERPL-MCNC: <0.01 NG/ML — SIGNIFICANT CHANGE UP (ref 0–0.06)
UROBILINOGEN FLD QL: NEGATIVE — SIGNIFICANT CHANGE UP
VARIANT LYMPHS # BLD: 1 % — SIGNIFICANT CHANGE UP (ref 0–6)
WBC # BLD: 13.5 K/UL — HIGH (ref 3.8–10.5)
WBC # FLD AUTO: 13.5 K/UL — HIGH (ref 3.8–10.5)
WBC UR QL: SIGNIFICANT CHANGE UP /HPF (ref 0–5)

## 2018-04-19 PROCEDURE — 93010 ELECTROCARDIOGRAM REPORT: CPT

## 2018-04-19 PROCEDURE — 71250 CT THORAX DX C-: CPT | Mod: 26

## 2018-04-19 PROCEDURE — 93308 TTE F-UP OR LMTD: CPT | Mod: 26

## 2018-04-19 PROCEDURE — 99291 CRITICAL CARE FIRST HOUR: CPT | Mod: GC

## 2018-04-19 PROCEDURE — 74018 RADEX ABDOMEN 1 VIEW: CPT | Mod: 26

## 2018-04-19 PROCEDURE — 99223 1ST HOSP IP/OBS HIGH 75: CPT | Mod: AI,GC

## 2018-04-19 PROCEDURE — 71045 X-RAY EXAM CHEST 1 VIEW: CPT | Mod: 26

## 2018-04-19 RX ORDER — VALSARTAN 80 MG/1
320 TABLET ORAL DAILY
Qty: 0 | Refills: 0 | Status: DISCONTINUED | OUTPATIENT
Start: 2018-04-19 | End: 2018-04-21

## 2018-04-19 RX ORDER — AZITHROMYCIN 500 MG/1
0 TABLET, FILM COATED ORAL
Qty: 0 | Refills: 0 | COMMUNITY

## 2018-04-19 RX ORDER — FLUTICASONE PROPIONATE 220 MCG
0 AEROSOL WITH ADAPTER (GRAM) INHALATION
Qty: 0 | Refills: 0 | COMMUNITY

## 2018-04-19 RX ORDER — LABETALOL HCL 100 MG
100 TABLET ORAL
Qty: 0 | Refills: 0 | Status: DISCONTINUED | OUTPATIENT
Start: 2018-04-19 | End: 2018-04-19

## 2018-04-19 RX ORDER — VALSARTAN 80 MG/1
1 TABLET ORAL
Qty: 0 | Refills: 0 | COMMUNITY

## 2018-04-19 RX ORDER — LABETALOL HCL 100 MG
100 TABLET ORAL
Qty: 0 | Refills: 0 | Status: DISCONTINUED | OUTPATIENT
Start: 2018-04-19 | End: 2018-04-21

## 2018-04-19 RX ORDER — LOSARTAN POTASSIUM 100 MG/1
0 TABLET, FILM COATED ORAL
Qty: 0 | Refills: 0 | COMMUNITY

## 2018-04-19 RX ORDER — ENOXAPARIN SODIUM 100 MG/ML
40 INJECTION SUBCUTANEOUS EVERY 24 HOURS
Qty: 0 | Refills: 0 | Status: DISCONTINUED | OUTPATIENT
Start: 2018-04-19 | End: 2018-04-21

## 2018-04-19 RX ORDER — LABETALOL HCL 100 MG
1 TABLET ORAL
Qty: 0 | Refills: 0 | COMMUNITY

## 2018-04-19 RX ORDER — ASPIRIN/CALCIUM CARB/MAGNESIUM 324 MG
243 TABLET ORAL ONCE
Qty: 0 | Refills: 0 | Status: COMPLETED | OUTPATIENT
Start: 2018-04-19 | End: 2018-04-19

## 2018-04-19 RX ORDER — LABETALOL HCL 100 MG
0 TABLET ORAL
Qty: 0 | Refills: 0 | COMMUNITY

## 2018-04-19 RX ORDER — FUROSEMIDE 40 MG
40 TABLET ORAL ONCE
Qty: 0 | Refills: 0 | Status: COMPLETED | OUTPATIENT
Start: 2018-04-19 | End: 2018-04-19

## 2018-04-19 RX ORDER — AZITHROMYCIN 500 MG/1
500 TABLET, FILM COATED ORAL ONCE
Qty: 0 | Refills: 0 | Status: COMPLETED | OUTPATIENT
Start: 2018-04-19 | End: 2018-04-19

## 2018-04-19 RX ORDER — AZITHROMYCIN 500 MG/1
500 TABLET, FILM COATED ORAL EVERY 24 HOURS
Qty: 0 | Refills: 0 | Status: COMPLETED | OUTPATIENT
Start: 2018-04-20 | End: 2018-04-21

## 2018-04-19 RX ORDER — OMEPRAZOLE 10 MG/1
0 CAPSULE, DELAYED RELEASE ORAL
Qty: 0 | Refills: 0 | COMMUNITY

## 2018-04-19 RX ORDER — BUDESONIDE AND FORMOTEROL FUMARATE DIHYDRATE 160; 4.5 UG/1; UG/1
2 AEROSOL RESPIRATORY (INHALATION)
Qty: 0 | Refills: 0 | COMMUNITY

## 2018-04-19 RX ORDER — CEFTRIAXONE 500 MG/1
1 INJECTION, POWDER, FOR SOLUTION INTRAMUSCULAR; INTRAVENOUS EVERY 24 HOURS
Qty: 0 | Refills: 0 | Status: DISCONTINUED | OUTPATIENT
Start: 2018-04-19 | End: 2018-04-21

## 2018-04-19 RX ORDER — VANCOMYCIN HCL 1 G
1000 VIAL (EA) INTRAVENOUS ONCE
Qty: 0 | Refills: 0 | Status: COMPLETED | OUTPATIENT
Start: 2018-04-19 | End: 2018-04-19

## 2018-04-19 RX ADMIN — Medication 40 MILLIGRAM(S): at 13:15

## 2018-04-19 RX ADMIN — Medication 250 MILLIGRAM(S): at 16:23

## 2018-04-19 RX ADMIN — Medication 75 MILLIGRAM(S): at 16:37

## 2018-04-19 RX ADMIN — Medication 100 MILLIGRAM(S): at 21:25

## 2018-04-19 RX ADMIN — ENOXAPARIN SODIUM 40 MILLIGRAM(S): 100 INJECTION SUBCUTANEOUS at 21:25

## 2018-04-19 RX ADMIN — CEFTRIAXONE 100 GRAM(S): 500 INJECTION, POWDER, FOR SOLUTION INTRAMUSCULAR; INTRAVENOUS at 13:15

## 2018-04-19 RX ADMIN — AZITHROMYCIN 250 MILLIGRAM(S): 500 TABLET, FILM COATED ORAL at 14:20

## 2018-04-19 RX ADMIN — Medication 243 MILLIGRAM(S): at 14:19

## 2018-04-19 NOTE — ED ADULT NURSE REASSESSMENT NOTE - NS ED NURSE REASSESS COMMENT FT1
Pt straight cathed using sterile technique.  2 RNs present.  Pt tolerated procedure well. Sterile specimen collected. UA and Culture sent.

## 2018-04-19 NOTE — ED ADULT TRIAGE NOTE - CHIEF COMPLAINT QUOTE
Shortness of breath associated with chest pain. As per son she was recently diagnosed with flu. Pt recently came to the [place] from [place]

## 2018-04-19 NOTE — H&P ADULT - PROBLEM SELECTOR PLAN 1
RVP positive of influenza B. CXR clear. on exam has diffuse crackles. 2D TTE showing diffuse B lines. No peripheral edema noted on exam. though with EF low per son, no records to confirm. BNP mildly elevated. WBC elevated with lymphocyte predominant on diff. troponins negative. Less likely pt has CHF exacerbation. patient requiring BiPaP for respiratory distress.   -will c/w tamiflu for now, though likely outside of 48 hour window  -continue with BiPaP for now and attempt to transition to NC tomorrow if patient can tolerate  -will order TTE to assess EF to r/o CHF  -without consolidation on CXR, but could still have PNA not detectable by CXR. Will order CT chest patient with respiratory distress requiring BiPaP. likely 2/2 to suspected pneumonia. CXR negative for consolidation.  -will have BiPaP PRN, will assess patient on NC  -will order CT chest to better assess for consolidation  -will continue with ceftriaxone  -continue with azithromycin for total 3 day course

## 2018-04-19 NOTE — H&P ADULT - ATTENDING COMMENTS
patient seen and examined by me with the resident and team. Agree with the resident findings assessment and plan as documented unless noted below. Acute Viral syndrome with influenza B / acute hypoxic respiratory failure requiring BiPAP/ sepsis secondary to influenza/ and suspected Pneumonia- . Also rule out CHF.  monitor on tele.  monitor CBC  d/w patient son for length.   Time spent this encounter 70 minutes.

## 2018-04-19 NOTE — H&P ADULT - NSHPREVIEWOFSYSTEMS_GEN_ALL_CORE
REVIEW OF SYSTEMS  --------------------------------------------------------------------------------  Gen: No weight changes, +fevers  Skin: No rashes  Head/Eyes/Ears/Mouth: +headache; Normal hearing; Normal vision w/o blurriness; +sore throat  Respiratory: +dyspnea, +cough productive of yellow-green sputum, +wheezing, +hemoptysis  CV: +chest pain, -PND, -orthopnea  GI: +abdominal pain, +diarrhea, -constipation, -nausea, -vomiting, -melena, -hematochezia  : No increased frequency, dysuria, hematuria, nocturia  MSK: +joint pain 2/2 arthritis  Neuro: +dizziness/lightheadedness, +tingling in hands (has carpal tunnel)  Endo: No heat/cold intolerance  Psych: No significant nervousness, anxiety, stress, depression

## 2018-04-19 NOTE — H&P ADULT - NSHPLABSRESULTS_GEN_ALL_CORE
13.2   13.5  )-----------( 64       ( 2018 13:10 )             37.9     PT/INR - ( 2018 13:10 )   PT: 10.8 sec;   INR: 1.00 ratio         PTT - ( 2018 13:10 )  PTT:23.2 sec    Serum Pro-Brain Natriuretic Peptide (18 @ 13:10)    Serum Pro-Brain Natriuretic Peptide: 328 pg/mL        140  |  103  |  12  ----------------------------<  108<H>  3.8   |  22  |  0.54    Ca    9.4      2018 13:10    TPro  7.8  /  Alb  3.7  /  TBili  0.6  /  DBili  x   /  AST  33  /  ALT  21  /  AlkPhos  102      Blood Gas Profile - Venous (18 @ 13:10)    pH, Venous: 7.42    pCO2, Venous: 41 mmHg    pO2, Venous: 39 mmHg    HCO3, Venous: 26 mmol/L    Base Excess, Venous: 1.9 mmol/L    Oxygen Saturation, Venous: 70 %    Total CO2, Venous: 27 mmol/L    Urinalysis Basic - ( 2018 13:11 )    Color: Yellow / Appearance: Clear / S.013 / pH: x  Gluc: x / Ketone: Negative  / Bili: Negative / Urobili: Negative   Blood: x / Protein: 30 mg/dL / Nitrite: Negative   Leuk Esterase: Negative / RBC: x / WBC 3-5 /HPF   Sq Epi: x / Non Sq Epi: OCC /HPF / Bacteria: x      Rapid Respiratory Viral Panel (18 @ 13:11)    Rapid RVP Result: Detected: The [employer] RVP Rapid uses polymerase chain reaction (PCR) and melt  curve analysis to screen for adenovirus; coronavirus HKU1, NL63, 229E,  OC43; human metapneumovirus (hMPV); human enterovirus/rhinovirus  (Entero/RV); influenza A; influenza A/H1;influenza A/H3; influenza  A/H1-2009; influenza B; parainfluenza viruses 1, 2, 3, 4; respiratory  syncytial virus; Bordetella pertussis; Mycoplasma pneumoniae; and  Chlamydophila pneumoniae.    Influenza B (RapRVP): Detected    < from: Xray Chest 1 View- PORTABLE-Urgent (18 @ 13:30) >    FINDINGS:     Cardiomediastinal silhouette within normal limits.   Clear lungs.  No pleural effusion.   No pneumothorax.   Degenerative changes of spine.     IMPRESSION:   Clear lungs.       < end of copied text >    < from: US TTE 2D F/U, Limited w/o Contrast (ED) (18 @ 13:31) >    IMPRESSION:   No Pericardial Effusion. No pleural effusions. Diffuse bilateral apical   and axillary b-lines.     < end of copied text >    Troponin T, Serum (18 @ 13:10)    Troponin T, Serum: <0.01 ng/mL 13.2   13.5  )-----------( 64       ( 2018 13:10 )             37.9     PT/INR - ( 2018 13:10 )   PT: 10.8 sec;   INR: 1.00 ratio         PTT - ( 2018 13:10 )  PTT:23.2 sec    Serum Pro-Brain Natriuretic Peptide (18 @ 13:10)    Serum Pro-Brain Natriuretic Peptide: 328 pg/mL        140  |  103  |  12  ----------------------------<  108<H>  3.8   |  22  |  0.54    Ca    9.4      2018 13:10    TPro  7.8  /  Alb  3.7  /  TBili  0.6  /  DBili  x   /  AST  33  /  ALT  21  /  AlkPhos  102      Blood Gas Profile - Venous (18 @ 13:10)    pH, Venous: 7.42    pCO2, Venous: 41 mmHg    pO2, Venous: 39 mmHg    HCO3, Venous: 26 mmol/L    Base Excess, Venous: 1.9 mmol/L    Oxygen Saturation, Venous: 70 %    Total CO2, Venous: 27 mmol/L    Urinalysis Basic - ( 2018 13:11 )    Color: Yellow / Appearance: Clear / S.013 / pH: x  Gluc: x / Ketone: Negative  / Bili: Negative / Urobili: Negative   Blood: x / Protein: 30 mg/dL / Nitrite: Negative   Leuk Esterase: Negative / RBC: x / WBC 3-5 /HPF   Sq Epi: x / Non Sq Epi: OCC /HPF / Bacteria: x      Rapid Respiratory Viral Panel (18 @ 13:11)    Rapid RVP Result: Detected: The Film[employer] RVP Rapid uses polymerase chain reaction (PCR) and melt  curve analysis to screen for adenovirus; coronavirus HKU1, NL63, 229E,  OC43; human metapneumovirus (hMPV); human enterovirus/rhinovirus  (Entero/RV); influenza A; influenza A/H1;influenza A/H3; influenza  A/H1-2009; influenza B; parainfluenza viruses 1, 2, 3, 4; respiratory  syncytial virus; Bordetella pertussis; Mycoplasma pneumoniae; and  Chlamydophila pneumoniae.    Influenza B (RapRVP): Detected    < from: Xray Chest 1 View- PORTABLE-Urgent (18 @ 13:30) >    FINDINGS:     Cardiomediastinal silhouette within normal limits.   Clear lungs.  No pleural effusion.   No pneumothorax.   Degenerative changes of spine.     IMPRESSION:   Clear lungs.       < end of copied text >    < from: US TTE 2D F/U, Limited w/o Contrast (ED) (18 @ 13:31) >    IMPRESSION:   No Pericardial Effusion. No pleural effusions. Diffuse bilateral apical   and axillary b-lines.     < end of copied text >    Troponin T, Serum (18 @ 13:10)    Troponin T, Serum: <0.01 ng/mL    CXR personally film personally visualized - No obvious infiltrate.

## 2018-04-19 NOTE — ED PROVIDER NOTE - MEDICAL DECISION MAKING DETAILS
ACS vs CHF exacerbation vs post-viral PNA.  EKG without acute ischemia, placed on bipap for respiratory distress.  lasix for possible fluid overload w/ bilateral crackles, cover with antibiotics for persistent fever and worsening cough.  TBA.

## 2018-04-19 NOTE — H&P ADULT - PROBLEM SELECTOR PLAN 3
unclear cause of thrombocytopenia. per records, noted to have platelets of 58 in November  -continue to monitor CBC  -consider hematology consult to assess etiology of isolated thrombocytopenia RVP positive of influenza B. CXR clear. on exam has diffuse crackles. 2D TTE showing diffuse B lines. No peripheral edema noted on exam. though with EF low per son, no records to confirm. BNP mildly elevated. WBC elevated with lymphocyte predominant on diff. troponins negative. Less likely pt has CHF exacerbation. patient requiring BiPaP for respiratory distress.   -will c/w tamiflu for now, though likely outside of 48 hour window  -continue with BiPaP for now and attempt to transition to NC tomorrow if patient can tolerate  -will order TTE to assess EF to r/o CHF  -without consolidation on CXR, but could still have PNA not detectable by CXR. Will order CT chest

## 2018-04-19 NOTE — H&P ADULT - PROBLEM SELECTOR PLAN 5
DVT - hold HSQ in the setting of thrombocytopenia. will give SCDs  PT consult unclear cause of thrombocytopenia. per records, noted to have platelets of 58 in November  -continue to monitor CBC  -consider hematology consult to assess etiology of isolated thrombocytopenia

## 2018-04-19 NOTE — H&P ADULT - ASSESSMENT
82 yo F w/ hx of HTN p/w worsening dyspnea and chest pain likely 2/2 viral PNA vs. CHF exacerbation.

## 2018-04-19 NOTE — ED ADULT NURSE NOTE - OBJECTIVE STATEMENT
83 y/o female a+ox3, pmhx aortic valve insufficiency, arthritis, spinal stenosis, HTN, coming from home for shortness of breath with cough and chest pain. Pt recent diagnosis with flu as well as returned from [place] 4 days ago. Onset of right arm pain began last night; non-radiating. upon waking pt c/o difficulty breathing with worsened cough. Pt on CM with spo2 room air reading 94% with crackles bilaterally; RT called and pt placed on BiPAP; pt tolerated well and spo2 increased to 97%. Iv established, labs obtained. Pt left in position of comfort, family at bedside, will reassess

## 2018-04-19 NOTE — H&P ADULT - PROBLEM SELECTOR PLAN 4
-will hold antihypertensives (valsartan, labetalol) in the setting of infection  -will resume as clinical status improves pt with new abdominal distention and discomfort. likely 2/2 constipation  -will order AXR to rule out obstruction  -serial abdominal exams

## 2018-04-19 NOTE — ED ADULT NURSE REASSESSMENT NOTE - NS ED NURSE REASSESS COMMENT FT1
As per Dr. Grande, OK to take pt off bipap for administration of PO Tamiflu. Pt taken off bipap and tolerated PO with no issues. As per Dr. Grande, OK to take pt off bipap for administration of PO Tamiflu. Pt taken off bipap and tolerated PO with no issues. Bipap replaced

## 2018-04-19 NOTE — H&P ADULT - HISTORY OF PRESENT ILLNESS
82 yo F w/ hx of HTN and arthritis p/w 2-3 days of worsening SOB and chest pain. Most of history is obtained from the son at home, and from another son at bedside. Per the sons, patient was recently diagnosed with the flu 3 weeks ago. At that time, she was given tamiflu as well as antibiotics, and she improved. She then went to [place] to visit her other son, and once she came back, her shortness of breath began to worsen. She began to have increased cough productive of yellow sputum and occasional blood. In addition to the shortness of breath, the patient has been having chest pain that has been ongoing for the past 2 weeks. The son also endorses that patient is having palpitations and orthopnea. Patient then went to her doctor a few days ago and was prescribed prednisone and nebulizers. However, she was not improving, and so her doctor told her to come to the ED.  Also has been having fevers for the past few days, along with a sore throat. Per the son, patient 3 weeks ago had an echocardiogram and was told that the EF was low, but he is unsure of the actual EF. Per the son, the patient did not have any chills, weight loss. Of note, patient has been having diarrhea which the son reports has been on and off. Patient previously took imodium for the diarrhea. Patient was also previously on pantoprazole which was discontinued due to her thrombocytopenia. Son also reports that patient has been taking steroid injections for chronic joint pains from her arthritis every few weeks.    In the ED, patient VS: T 999.9, -166/76-87, HR 80s, RR 25-33.  POCUS in the ED showed diffuse B lines without pleural and pericardial effusions. Labs showed WBC of 13.5, Platelets of 64. BMP unremarkable. BNP of 328. RVP positive for influenza B. CXR done showed clear lungs. Patient was given azithromycin, ceftriaxone, Lasix 40 IV, vanc 1 g IV, and tamiflu. 80 yo F w/ hx of HTN and arthritis p/w 2-3 days of worsening SOB and chest pain. Most of history is obtained from the son at home, and from another son at bedside. Per the sons, patient was recently diagnosed with the flu 3 weeks ago. At that time, she was given tamiflu as well as antibiotics, and she improved. She then went to [place] to visit her other son, and once she came back, her shortness of breath began to worsen. She began to have increased cough productive of yellow sputum, occasional blood, and chest pain. Denies orthopnea. Endorses fevers and sore throat. Patient then went to her doctor a few days ago and was prescribed prednisone and nebulizers. However, she was not improving, and so her doctor told her to come to the ED.      Of note, per the son, patient 3 weeks ago had an echocardiogram and was told that the EF was low, but he is unsure of the actual EF. Of note, patient has been having diarrhea which the son reports has been on and off. Patient previously took imodium for the diarrhea. Patient was also previously on pantoprazole which was discontinued due to her thrombocytopenia.     In the ED, patient VS: T 999.9, -166/76-87, HR 80s, RR 25-33.  POCUS in the ED showed diffuse B lines without pleural and pericardial effusions. Labs showed WBC of 13.5, Platelets of 64. BMP unremarkable. BNP of 328. RVP positive for influenza B. CXR done showed clear lungs. Patient was given azithromycin, ceftriaxone, Lasix 40 IV, vanc 1 g IV, and tamiflu.

## 2018-04-19 NOTE — ED PROVIDER NOTE - ATTENDING CONTRIBUTION TO CARE
Dr. Heck : I have personally seen and examined this patient at the bedside. I have fully participated in the care of this patient. I have reviewed all pertinent clinical information, including history, physical exam, plan and the Resident's note and agree except as noted.       83yo F hx of chf, htn aortic valve insufficiency diagnosed with flu 5 days ago, recently returned from visiting son in [place] 4 days ago pw worsening sob.  +cough, shortness of breath, and chest pain. cp radiating to right arm.    Dyspnea and pain are exertional.  . Had some medication changes at that time, son has list in car, unsure of meds off hand.  Primary language Georgian, translation assisted by son.  Denies f/c/n/v//palpitations/abd.pain/d/c/dysuria/hematuria. no sick contacts/recent travel.    PE:  head; atraumatic normocephalic  eyes: perrla  Heart: rrr s1s2  lungs: bl crackles  abd: soft, nt nd + bs no rebound/guarding no cva ttp  le: no swelling no calf ttp  back: no midline cervical/thoracic/lumbar ttp      -->chf vs flu pna will fu labs cxr; echo; abx---bipap for work of breathing--ADMIT

## 2018-04-19 NOTE — ED PROVIDER NOTE - OBJECTIVE STATEMENT
Patient was diagnosed with flu 5 days ago, recently returned from visiting son in [place] 4 days ago.  For past 2-3 days she has had increased cough, shortness of breath, and chest pain.  Last night chest pain radiated to R arm.  Son also noted crackles when she was breathing and became more short of breath this morning.  Dyspnea and pain are exertional.  She has a history of aortic valve insufficiency but no hx surgery. No history known coronary disease however has never had cath or stress test.  Has known heart failure, per son last echo 3 weeks ago as outpatient, unsure EF.  Had some medication changes at that time, son has list in car, unsure of meds off hand.  PMD: Bonnie, ? Patient was diagnosed with flu 5 days ago, recently returned from visiting son in [place] 4 days ago.  For past 2-3 days she has had increased cough, shortness of breath, and chest pain.  Last night chest pain radiated to R arm.  Son also noted crackles when she was breathing and became more short of breath this morning.  Dyspnea and pain are exertional.  She has a history of aortic valve insufficiency but no hx surgery. No history known coronary disease however has never had cath or stress test.  Has known heart failure, per son last echo 3 weeks ago as outpatient, unsure EF.  Had some medication changes at that time, son has list in car, unsure of meds off hand.  Primary language Malay, translation assisted by son.  PMD: Bonnie, ?

## 2018-04-19 NOTE — H&P ADULT - PROBLEM SELECTOR PLAN 2
pt with new abdominal distention and pain. in the setting of thrombocytopenia, concern for RP bleed.  -will order CT abdomen non-con to r/o RP bleed  -serial abdominal exams tachycardic, elevated WBC, source likely pulmonary. likely 2/2 influenza.  --will order CT chest to better assess for consolidation  -will continue with ceftriaxone  -continue with azithromycin for total 3 day course  -monitor BP  -monitor CBC

## 2018-04-19 NOTE — H&P ADULT - NSHPPHYSICALEXAM_GEN_ALL_CORE
PHYSICAL EXAM:    Constitutional: lying in bed, in moderate distress, on BiPaP  Respiratory: diffuse crackles heard bilaterally.  Cardiovascular: S1, S2. no murmurs, rubs, or gallops  Gastrointestinal: soft, tenderness to palpation, and with distention. tympanic to percussion.  Neurological: A+O x3  Skin: no rashes noted  Musculoskeletal: no appreciable joint swelling noted  Psychiatric: A+O x3 PHYSICAL EXAM:    Constitutional: lying in bed, in moderate distress, on BiPaP  Respiratory: diffuse crackles heard bilaterally.  Cardiovascular: S1, S2. no murmurs, rubs, or gallops  Gastrointestinal: soft, discomfort to palpation, and with distention. tympanic to percussion.  Neurological: A+O x3  Skin: no rashes noted  Musculoskeletal: no appreciable joint swelling noted  Psychiatric: A+O x3 PHYSICAL EXAM:    General: NAD, lying in bed, in mild - moderate distress, on BiPaP  HEENT- Normocephalic , atraumatic head .   Neck - supple, no thyromegaly   Respiratory: Equal air entry,. no wheezing,  diffuse crackles and coarse BS heard bilaterally.  Cardiovascular: S1, S2. no murmurs, rubs, or gallops  Gastrointestinal: soft, discomfort to palpation, and with distention. tympanic to percussion. BS + no peritoneal sign   Extremity- No clubbing , cyanosis or edema   Back- No CVA tenderness   Neurological: A+O x3. No FND  Skin: no rashes noted, or lesion   Musculoskeletal: no appreciable joint swelling noted. ROM - intact   Psychiatric: A+O x3

## 2018-04-19 NOTE — H&P ADULT - FAMILY HISTORY
Family history of heart attack     Sibling  Still living? Unknown  Family history of stroke, Age at diagnosis: Age Unknown

## 2018-04-20 LAB
-  COAGULASE NEGATIVE STAPHYLOCOCCUS: SIGNIFICANT CHANGE UP
ANION GAP SERPL CALC-SCNC: 12 MMOL/L — SIGNIFICANT CHANGE UP (ref 5–17)
BUN SERPL-MCNC: 8 MG/DL — SIGNIFICANT CHANGE UP (ref 7–23)
C DIFF GDH STL QL: NEGATIVE — SIGNIFICANT CHANGE UP
C DIFF GDH STL QL: SIGNIFICANT CHANGE UP
CALCIUM SERPL-MCNC: 8.3 MG/DL — LOW (ref 8.4–10.5)
CHLORIDE SERPL-SCNC: 104 MMOL/L — SIGNIFICANT CHANGE UP (ref 96–108)
CO2 SERPL-SCNC: 25 MMOL/L — SIGNIFICANT CHANGE UP (ref 22–31)
CREAT SERPL-MCNC: 0.5 MG/DL — SIGNIFICANT CHANGE UP (ref 0.5–1.3)
CULTURE RESULTS: NO GROWTH — SIGNIFICANT CHANGE UP
GLUCOSE SERPL-MCNC: 114 MG/DL — HIGH (ref 70–99)
GRAM STN FLD: SIGNIFICANT CHANGE UP
HCT VFR BLD CALC: 31.6 % — LOW (ref 34.5–45)
HGB BLD-MCNC: 10.5 G/DL — LOW (ref 11.5–15.5)
LEGIONELLA AG UR QL: NEGATIVE — SIGNIFICANT CHANGE UP
MAGNESIUM SERPL-MCNC: 1.9 MG/DL — SIGNIFICANT CHANGE UP (ref 1.6–2.6)
MCHC RBC-ENTMCNC: 28.5 PG — SIGNIFICANT CHANGE UP (ref 27–34)
MCHC RBC-ENTMCNC: 33.2 GM/DL — SIGNIFICANT CHANGE UP (ref 32–36)
MCV RBC AUTO: 85.6 FL — SIGNIFICANT CHANGE UP (ref 80–100)
METHOD TYPE: SIGNIFICANT CHANGE UP
PHOSPHATE SERPL-MCNC: 3.1 MG/DL — SIGNIFICANT CHANGE UP (ref 2.5–4.5)
PLATELET # BLD AUTO: 60 K/UL — LOW (ref 150–400)
POTASSIUM SERPL-MCNC: 3.4 MMOL/L — LOW (ref 3.5–5.3)
POTASSIUM SERPL-SCNC: 3.4 MMOL/L — LOW (ref 3.5–5.3)
RBC # BLD: 3.69 M/UL — LOW (ref 3.8–5.2)
RBC # FLD: 15.7 % — HIGH (ref 10.3–14.5)
SODIUM SERPL-SCNC: 141 MMOL/L — SIGNIFICANT CHANGE UP (ref 135–145)
SPECIMEN SOURCE: SIGNIFICANT CHANGE UP
WBC # BLD: 8.59 K/UL — SIGNIFICANT CHANGE UP (ref 3.8–10.5)
WBC # FLD AUTO: 8.59 K/UL — SIGNIFICANT CHANGE UP (ref 3.8–10.5)

## 2018-04-20 PROCEDURE — 99233 SBSQ HOSP IP/OBS HIGH 50: CPT | Mod: GC

## 2018-04-20 PROCEDURE — 93306 TTE W/DOPPLER COMPLETE: CPT | Mod: 26

## 2018-04-20 RX ORDER — POTASSIUM CHLORIDE 20 MEQ
40 PACKET (EA) ORAL EVERY 4 HOURS
Qty: 0 | Refills: 0 | Status: COMPLETED | OUTPATIENT
Start: 2018-04-20 | End: 2018-04-20

## 2018-04-20 RX ORDER — TRAMADOL HYDROCHLORIDE 50 MG/1
25 TABLET ORAL ONCE
Qty: 0 | Refills: 0 | Status: DISCONTINUED | OUTPATIENT
Start: 2018-04-20 | End: 2018-04-21

## 2018-04-20 RX ORDER — ACETAMINOPHEN 500 MG
650 TABLET ORAL EVERY 6 HOURS
Qty: 0 | Refills: 0 | Status: DISCONTINUED | OUTPATIENT
Start: 2018-04-20 | End: 2018-04-21

## 2018-04-20 RX ADMIN — ENOXAPARIN SODIUM 40 MILLIGRAM(S): 100 INJECTION SUBCUTANEOUS at 21:13

## 2018-04-20 RX ADMIN — Medication 650 MILLIGRAM(S): at 22:47

## 2018-04-20 RX ADMIN — Medication 40 MILLIEQUIVALENT(S): at 16:08

## 2018-04-20 RX ADMIN — CEFTRIAXONE 100 GRAM(S): 500 INJECTION, POWDER, FOR SOLUTION INTRAMUSCULAR; INTRAVENOUS at 12:36

## 2018-04-20 RX ADMIN — Medication 100 MILLIGRAM(S): at 05:15

## 2018-04-20 RX ADMIN — Medication 40 MILLIEQUIVALENT(S): at 21:13

## 2018-04-20 RX ADMIN — AZITHROMYCIN 250 MILLIGRAM(S): 500 TABLET, FILM COATED ORAL at 12:36

## 2018-04-20 RX ADMIN — Medication 100 MILLIGRAM(S): at 17:48

## 2018-04-20 RX ADMIN — VALSARTAN 320 MILLIGRAM(S): 80 TABLET ORAL at 05:15

## 2018-04-20 RX ADMIN — Medication 650 MILLIGRAM(S): at 23:47

## 2018-04-20 NOTE — PROGRESS NOTE ADULT - PROBLEM SELECTOR PLAN 3
RVP positive of influenza B. CXR clear. on exam has diffuse crackles. 2D TTE showing diffuse B lines. No peripheral edema noted on exam. though with EF low per son, no records to confirm. BNP mildly elevated. WBC elevated with lymphocyte predominant on diff. troponins negative. Less likely pt has CHF exacerbation. patient requiring BiPaP for respiratory distress.   -will c/w tamiflu for now, though likely outside of 48 hour window  -c/w NC, BiPAP prn  -will order TTE to assess EF to r/o CHF  -CT chest w/ b/l opacities

## 2018-04-20 NOTE — PHYSICAL THERAPY INITIAL EVALUATION ADULT - CRITERIA FOR SKILLED THERAPEUTIC INTERVENTIONS
therapy frequency/anticipated discharge recommendation/risk reduction/prevention/functional limitations in following categories/predicted duration of therapy intervention/impairments found/rehab potential/anticipated equipment needs at discharge

## 2018-04-20 NOTE — PHYSICAL THERAPY INITIAL EVALUATION ADULT - RANGE OF MOTION EXAMINATION, REHAB EVAL
bilateral lower extremity ROM was WFL (within functional limits)/Varus deformity at bilat knees/bilateral upper extremity ROM was WFL (within functional limits)

## 2018-04-20 NOTE — PROGRESS NOTE ADULT - PROBLEM SELECTOR PLAN 1
patient with respiratory distress requiring BiPaP. likely 2/2 to suspected pneumonia. CXR negative for consolidation.  -will have BiPaP PRN, will assess patient on NC  -CT chest w/ bilateral opacities  -will continue with ceftriaxone  -continue with azithromycin for total 3 day course

## 2018-04-20 NOTE — PHYSICAL THERAPY INITIAL EVALUATION ADULT - PERTINENT HX OF CURRENT PROBLEM, REHAB EVAL
81F with HTN presents with worsening dyspnea and chest pain requiring BiPAP found with peripheral opacities on CT chest receiving treatment for CAP, will rule out acute heart failure, Dx: Acute respiratory failure; CAP

## 2018-04-20 NOTE — PROGRESS NOTE ADULT - PROBLEM SELECTOR PLAN 5
unclear cause of thrombocytopenia. per records, noted to have platelets of 58 in November  -continue to monitor CBC  -consider hematology consult to assess etiology of isolated thrombocytopenia

## 2018-04-20 NOTE — PROVIDER CONTACT NOTE (CRITICAL VALUE NOTIFICATION) - TEST AND RESULT REPORTED:
Blood cultures from 4/19 preliminary results with growth in aerobic bottle, gram positive cocci in clusters.

## 2018-04-20 NOTE — PHYSICAL THERAPY INITIAL EVALUATION ADULT - PRECAUTIONS/LIMITATIONS, REHAB EVAL
fall precautions/4/20: CT chest showing peripheral opacities. off BiPAP. pending PT and TTE.  GPC clusters (1/2 bottles) - suspect contaminant, holding vancomycin for now. blood cx. re-sent./cardiac precautions

## 2018-04-20 NOTE — PROGRESS NOTE ADULT - PROBLEM SELECTOR PLAN 4
pt with new abdominal distention and discomfort. likely 2/2 constipation  -will order AXR to rule out obstruction  -serial abdominal exams

## 2018-04-20 NOTE — PROGRESS NOTE ADULT - SUBJECTIVE AND OBJECTIVE BOX
CONTACT INFO  Praveen Zepeda MD PGY 1  Pager: NS- [phone], [facility]- [phone]    Mon-Fri: pager covered by day team 7am-7pm;   ***Academic conferences M-F 8am-9am & 12pm-1pm- page ONLY if URGENT or if Consultant  /Tala: see chart, primary physician assigned available 7am-12pm  Sat/[place] Coverage 12pm-7pm: NS- page [phone] for Team1-4, [facility]- pager forwarded to covering Resident  For Night coverage 7pm-7am: NS- page [phone] Team1-3, page [phone] Team4 & Care Model    >>> <<<    cc: f/u for sepsis    SUBJECTIVE / OVERNIGHT EVENTS: CT chest w/ bilateral opacities.      MEDICATIONS  (STANDING):  azithromycin  IVPB 500 milliGRAM(s) IV Intermittent every 24 hours  cefTRIAXone   IVPB 1 Gram(s) IV Intermittent every 24 hours  enoxaparin Injectable 40 milliGRAM(s) SubCutaneous every 24 hours  labetalol 100 milliGRAM(s) Oral two times a day  valsartan 320 milliGRAM(s) Oral daily    MEDICATIONS  (PRN):      T(F): 97.3 (18 @ 04:30), Max: 99.9 (18 @ 12:50)  HR: 73 (18 @ 04:30) (72 - 89)  BP: 150/70 (18 @ 04:30) (116/90 - 168/93)  RR: 18 (18 @ 04:30) (18 - 33)  SpO2: 97% (18 @ 04:30) (94% - 100%)    18 @ 07:01  -  18 @ 06:07  --------------------------------------------------------  IN: 120 mL / OUT: 0 mL / NET: 120 mL      CAPILLARY BLOOD GLUCOSE            PHYSICAL EXAM:    General: NAD, lying in bed, in mild - moderate distress, on BiPaP  	HEENT- Normocephalic , atraumatic head .   	Neck - supple, no thyromegaly   	Respiratory: Equal air entry,. no wheezing,  diffuse crackles and coarse BS heard bilaterally.  	Cardiovascular: S1, S2. no murmurs, rubs, or gallops  	Gastrointestinal: soft, discomfort to palpation, and with distention. tympanic to percussion. BS + no peritoneal sign   	Extremity- No clubbing , cyanosis or edema   	Back- No CVA tenderness   	Neurological: A+O x3. No FND  	Skin: no rashes noted, or lesion   	Musculoskeletal: no appreciable joint swelling noted. ROM - intact   Psychiatric: A+O x3      LABS:                        13.2   13.5  )-----------( 64       ( 2018 13:10 )             37.9         140  |  103  |  12  ----------------------------<  108<H>  3.8   |  22  |  0.54    Ca    9.4      2018 13:10    TPro  7.8  /  Alb  3.7  /  TBili  0.6  /  DBili  x   /  AST  33  /  ALT  21  /  AlkPhos  102  -    PT/INR - ( 2018 13:10 )   PT: 10.8 sec;   INR: 1.00 ratio         PTT - ( 2018 13:10 )  PTT:23.2 sec  CARDIAC MARKERS ( 2018 20:18 )  x     / <0.01 ng/mL / 65 U/L / x     / 2.6 ng/mL  CARDIAC MARKERS ( 2018 13:10 )  x     / <0.01 ng/mL / x     / x     / x          Urinalysis Basic - ( 2018 13:11 )    Color: Yellow / Appearance: Clear / S.013 / pH: x  Gluc: x / Ketone: Negative  / Bili: Negative / Urobili: Negative   Blood: x / Protein: 30 mg/dL / Nitrite: Negative   Leuk Esterase: Negative / RBC: x / WBC 3-5 /HPF   Sq Epi: x / Non Sq Epi: OCC /HPF / Bacteria: x            RADIOLOGY & ADDITIONAL TESTS:    Imaging Personally Reviewed: CT chest  Consultant(s) Notes Reviewed:    Care Discussed with Consultants/Other Providers: CONTACT INFO  Praveen Zepeda MD PGY 1  Pager: NS- [phone], [facility]- [phone]    Mon-Fri: pager covered by day team 7am-7pm;   ***Academic conferences M-F 8am-9am & 12pm-1pm- page ONLY if URGENT or if Consultant  /Tala: see chart, primary physician assigned available 7am-12pm  Sat/[place] Coverage 12pm-7pm: NS- page [phone] for Team1-4, [facility]- pager forwarded to covering Resident  For Night coverage 7pm-7am: NS- page [phone] Team1-3, page [phone] Team4 & Care Model    >>> <<<    cc: f/u for sepsis    SUBJECTIVE / OVERNIGHT EVENTS: CT chest w/ bilateral peripheral opacities. improved, did not require BiPaP overnight.     MEDICATIONS  (STANDING):  azithromycin  IVPB 500 milliGRAM(s) IV Intermittent every 24 hours  cefTRIAXone   IVPB 1 Gram(s) IV Intermittent every 24 hours  enoxaparin Injectable 40 milliGRAM(s) SubCutaneous every 24 hours  labetalol 100 milliGRAM(s) Oral two times a day  valsartan 320 milliGRAM(s) Oral daily    MEDICATIONS  (PRN):      T(F): 97.3 (18 @ 04:30), Max: 99.9 (18 @ 12:50)  HR: 73 (18 @ 04:30) (72 - 89)  BP: 150/70 (18 @ 04:30) (116/90 - 168/93)  RR: 18 (18 @ 04:30) (18 - 33)  SpO2: 97% (18 @ 04:30) (94% - 100%)    18 @ 07:01  -  18 @ 06:07  --------------------------------------------------------  IN: 120 mL / OUT: 0 mL / NET: 120 mL      CAPILLARY BLOOD GLUCOSE      PHYSICAL EXAM:    General: NAD, sitting in bed comfortably eating.  Respiratory: Equal air entry. no wheezing,  diffuse crackles, appears to have improved.  Cardiovascular: S1, S2. no murmurs, rubs, or gallops  Gastrointestinal: soft, discomfort to palpation, and with distention. tympanic to percussion. BS + no peritoneal sign   Extremity- No clubbing , cyanosis or edema   Neurological: A+O x3. No FND  Skin: no rashes noted, or lesion   Musculoskeletal: no appreciable joint swelling noted. ROM - intact   Psychiatric: A+O x3      LABS:                        13.2   13.5  )-----------( 64       ( 2018 13:10 )             37.9     -    140  |  103  |  12  ----------------------------<  108<H>  3.8   |  22  |  0.54    Ca    9.4      2018 13:10    TPro  7.8  /  Alb  3.7  /  TBili  0.6  /  DBili  x   /  AST  33  /  ALT  21  /  AlkPhos  102      PT/INR - ( 2018 13:10 )   PT: 10.8 sec;   INR: 1.00 ratio         PTT - ( 2018 13:10 )  PTT:23.2 sec  CARDIAC MARKERS ( 2018 20:18 )  x     / <0.01 ng/mL / 65 U/L / x     / 2.6 ng/mL  CARDIAC MARKERS ( 2018 13:10 )  x     / <0.01 ng/mL / x     / x     / x          Urinalysis Basic - ( 2018 13:11 )    Color: Yellow / Appearance: Clear / S.013 / pH: x  Gluc: x / Ketone: Negative  / Bili: Negative / Urobili: Negative   Blood: x / Protein: 30 mg/dL / Nitrite: Negative   Leuk Esterase: Negative / RBC: x / WBC 3-5 /HPF   Sq Epi: x / Non Sq Epi: OCC /HPF / Bacteria: x            RADIOLOGY & ADDITIONAL TESTS:    Imaging Personally Reviewed: CT chest  Consultant(s) Notes Reviewed:    Care Discussed with Consultants/Other Providers: CONTACT INFO  Praveen Zepeda MD PGY 1  Pager: NS- [phone], [facility]- [phone]    Mon-Fri: pager covered by day team 7am-7pm;   ***Academic conferences M-F 8am-9am & 12pm-1pm- page ONLY if URGENT or if Consultant  /Tala: see chart, primary physician assigned available 7am-12pm  Sat/[place] Coverage 12pm-7pm: NS- page [phone] for Team1-4, [facility]- pager forwarded to covering Resident  For Night coverage 7pm-7am: NS- page [phone] Team1-3, page [phone] Team4 & Care Model    >>> <<<    cc: f/u for sepsis    SUBJECTIVE / OVERNIGHT EVENTS: CT chest w/ bilateral peripheral opacities. improved, did not require BiPaP overnight.     MEDICATIONS  (STANDING):  azithromycin  IVPB 500 milliGRAM(s) IV Intermittent every 24 hours  cefTRIAXone   IVPB 1 Gram(s) IV Intermittent every 24 hours  enoxaparin Injectable 40 milliGRAM(s) SubCutaneous every 24 hours  labetalol 100 milliGRAM(s) Oral two times a day  valsartan 320 milliGRAM(s) Oral daily    MEDICATIONS  (PRN):      T(F): 97.3 (18 @ 04:30), Max: 99.9 (18 @ 12:50)  HR: 73 (18 @ 04:30) (72 - 89)  BP: 150/70 (18 @ 04:30) (116/90 - 168/93)  RR: 18 (18 @ 04:30) (18 - 33)  SpO2: 97% (18 @ 04:30) (94% - 100%)    18 @ 07:01  -  18 @ 06:07  --------------------------------------------------------  IN: 120 mL / OUT: 0 mL / NET: 120 mL      CAPILLARY BLOOD GLUCOSE      PHYSICAL EXAM:    General: NAD, sitting in bed comfortably eating.  Respiratory: Equal air entry. no wheezing,  diffuse crackles, appears to have improved.  Cardiovascular: S1, S2. no murmurs, rubs, or gallops  Gastrointestinal: soft, discomfort to palpation, and with distention. tympanic to percussion. BS + no peritoneal sign   Extremity- No clubbing , cyanosis or edema   Neurological: A+O x3. No FND  Skin: no rashes noted, or lesion   Musculoskeletal: no appreciable joint swelling noted. ROM - intact   Psychiatric: A+O x3      LABS:                        10.5   8.59  )-----------( 60       ( 2018 07:57 )             31.6     04-20    141  |  104  |  8   ----------------------------<  114<H>  3.4<L>   |  25  |  0.50    Ca    8.3<L>      2018 05:47  Phos  3.1     -20  Mg     1.9     -20    TPro  7.8  /  Alb  3.7  /  TBili  0.6  /  DBili  x   /  AST  33  /  ALT  21  /  AlkPhos  102  04-19    TPro  7.8  /  Alb  3.7  /  TBili  0.6  /  DBili  x   /  AST  33  /  ALT  21  /  AlkPhos  102  04-19    PT/INR - ( 2018 13:10 )   PT: 10.8 sec;   INR: 1.00 ratio         PTT - ( 2018 13:10 )  PTT:23.2 sec  CARDIAC MARKERS ( 2018 20:18 )  x     / <0.01 ng/mL / 65 U/L / x     / 2.6 ng/mL  CARDIAC MARKERS ( 2018 13:10 )  x     / <0.01 ng/mL / x     / x     / x          Urinalysis Basic - ( 2018 13:11 )    Color: Yellow / Appearance: Clear / S.013 / pH: x  Gluc: x / Ketone: Negative  / Bili: Negative / Urobili: Negative   Blood: x / Protein: 30 mg/dL / Nitrite: Negative   Leuk Esterase: Negative / RBC: x / WBC 3-5 /HPF   Sq Epi: x / Non Sq Epi: OCC /HPF / Bacteria: x            RADIOLOGY & ADDITIONAL TESTS:    Imaging Personally Reviewed: CT chest  Consultant(s) Notes Reviewed:    Care Discussed with Consultants/Other Providers:

## 2018-04-20 NOTE — PROGRESS NOTE ADULT - PROBLEM SELECTOR PLAN 2
tachycardic, elevated WBC, source likely pulmonary. likely 2/2 influenza.  -CT chest w/ bilateral opacities  -will continue with ceftriaxone  -continue with azithromycin for total 3 day course  -monitor BP  -monitor CBC

## 2018-04-20 NOTE — PHYSICAL THERAPY INITIAL EVALUATION ADULT - ACTIVE RANGE OF MOTION EXAMINATION, REHAB EVAL
bilateral  lower extremity Active ROM was WFL (within functional limits)/bilateral upper extremity Active ROM was WFL (within functional limits)/Varus deformity at bilat knees

## 2018-04-20 NOTE — PHYSICAL THERAPY INITIAL EVALUATION ADULT - IMPAIRMENTS FOUND, PT EVAL
ROM/joint integrity and mobility/muscle strength/gait, locomotion, and balance/aerobic capacity/endurance

## 2018-04-20 NOTE — PHYSICAL THERAPY INITIAL EVALUATION ADULT - ADDITIONAL COMMENTS
82 year old female who PTA was living in a  with spouse,  is disabled as per Son, has CVA, house has approx 5 steps to negotaite and is living on first floor. 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

## 2018-04-21 ENCOUNTER — TRANSCRIPTION ENCOUNTER (OUTPATIENT)
Age: 83
End: 2018-04-21

## 2018-04-21 VITALS
SYSTOLIC BLOOD PRESSURE: 146 MMHG | RESPIRATION RATE: 18 BRPM | OXYGEN SATURATION: 92 % | HEART RATE: 71 BPM | TEMPERATURE: 98 F | DIASTOLIC BLOOD PRESSURE: 79 MMHG

## 2018-04-21 LAB
ANION GAP SERPL CALC-SCNC: 15 MMOL/L — SIGNIFICANT CHANGE UP (ref 5–17)
BUN SERPL-MCNC: 6 MG/DL — LOW (ref 7–23)
CALCIUM SERPL-MCNC: 8.8 MG/DL — SIGNIFICANT CHANGE UP (ref 8.4–10.5)
CHLORIDE SERPL-SCNC: 104 MMOL/L — SIGNIFICANT CHANGE UP (ref 96–108)
CO2 SERPL-SCNC: 18 MMOL/L — LOW (ref 22–31)
CREAT SERPL-MCNC: 0.5 MG/DL — SIGNIFICANT CHANGE UP (ref 0.5–1.3)
CULTURE RESULTS: SIGNIFICANT CHANGE UP
GLUCOSE SERPL-MCNC: 116 MG/DL — HIGH (ref 70–99)
MAGNESIUM SERPL-MCNC: 1.8 MG/DL — SIGNIFICANT CHANGE UP (ref 1.6–2.6)
ORGANISM # SPEC MICROSCOPIC CNT: SIGNIFICANT CHANGE UP
ORGANISM # SPEC MICROSCOPIC CNT: SIGNIFICANT CHANGE UP
PHOSPHATE SERPL-MCNC: 3.3 MG/DL — SIGNIFICANT CHANGE UP (ref 2.5–4.5)
POTASSIUM SERPL-MCNC: 5.1 MMOL/L — SIGNIFICANT CHANGE UP (ref 3.5–5.3)
POTASSIUM SERPL-SCNC: 5.1 MMOL/L — SIGNIFICANT CHANGE UP (ref 3.5–5.3)
SODIUM SERPL-SCNC: 137 MMOL/L — SIGNIFICANT CHANGE UP (ref 135–145)
SPECIMEN SOURCE: SIGNIFICANT CHANGE UP

## 2018-04-21 PROCEDURE — 82435 ASSAY OF BLOOD CHLORIDE: CPT

## 2018-04-21 PROCEDURE — 96374 THER/PROPH/DIAG INJ IV PUSH: CPT | Mod: XU

## 2018-04-21 PROCEDURE — 84484 ASSAY OF TROPONIN QUANT: CPT

## 2018-04-21 PROCEDURE — 83735 ASSAY OF MAGNESIUM: CPT

## 2018-04-21 PROCEDURE — 51701 INSERT BLADDER CATHETER: CPT

## 2018-04-21 PROCEDURE — 97161 PT EVAL LOW COMPLEX 20 MIN: CPT

## 2018-04-21 PROCEDURE — 99291 CRITICAL CARE FIRST HOUR: CPT | Mod: 25

## 2018-04-21 PROCEDURE — 82803 BLOOD GASES ANY COMBINATION: CPT

## 2018-04-21 PROCEDURE — 94640 AIRWAY INHALATION TREATMENT: CPT

## 2018-04-21 PROCEDURE — 84100 ASSAY OF PHOSPHORUS: CPT

## 2018-04-21 PROCEDURE — 82330 ASSAY OF CALCIUM: CPT

## 2018-04-21 PROCEDURE — 96375 TX/PRO/DX INJ NEW DRUG ADDON: CPT | Mod: XU

## 2018-04-21 PROCEDURE — C8929: CPT

## 2018-04-21 PROCEDURE — 83880 ASSAY OF NATRIURETIC PEPTIDE: CPT

## 2018-04-21 PROCEDURE — 83605 ASSAY OF LACTIC ACID: CPT

## 2018-04-21 PROCEDURE — 93005 ELECTROCARDIOGRAM TRACING: CPT | Mod: XU

## 2018-04-21 PROCEDURE — 80048 BASIC METABOLIC PNL TOTAL CA: CPT

## 2018-04-21 PROCEDURE — 85610 PROTHROMBIN TIME: CPT

## 2018-04-21 PROCEDURE — 87040 BLOOD CULTURE FOR BACTERIA: CPT

## 2018-04-21 PROCEDURE — 99239 HOSP IP/OBS DSCHRG MGMT >30: CPT

## 2018-04-21 PROCEDURE — 87086 URINE CULTURE/COLONY COUNT: CPT

## 2018-04-21 PROCEDURE — 84295 ASSAY OF SERUM SODIUM: CPT

## 2018-04-21 PROCEDURE — 74018 RADEX ABDOMEN 1 VIEW: CPT

## 2018-04-21 PROCEDURE — 82553 CREATINE MB FRACTION: CPT

## 2018-04-21 PROCEDURE — 85014 HEMATOCRIT: CPT

## 2018-04-21 PROCEDURE — 85027 COMPLETE CBC AUTOMATED: CPT

## 2018-04-21 PROCEDURE — 87150 DNA/RNA AMPLIFIED PROBE: CPT

## 2018-04-21 PROCEDURE — 71250 CT THORAX DX C-: CPT

## 2018-04-21 PROCEDURE — 82550 ASSAY OF CK (CPK): CPT

## 2018-04-21 PROCEDURE — 82947 ASSAY GLUCOSE BLOOD QUANT: CPT

## 2018-04-21 PROCEDURE — 87449 NOS EACH ORGANISM AG IA: CPT

## 2018-04-21 PROCEDURE — 71045 X-RAY EXAM CHEST 1 VIEW: CPT

## 2018-04-21 PROCEDURE — 87798 DETECT AGENT NOS DNA AMP: CPT

## 2018-04-21 PROCEDURE — 94660 CPAP INITIATION&MGMT: CPT

## 2018-04-21 PROCEDURE — 87486 CHLMYD PNEUM DNA AMP PROBE: CPT

## 2018-04-21 PROCEDURE — 87324 CLOSTRIDIUM AG IA: CPT

## 2018-04-21 PROCEDURE — 87633 RESP VIRUS 12-25 TARGETS: CPT

## 2018-04-21 PROCEDURE — 87581 M.PNEUMON DNA AMP PROBE: CPT

## 2018-04-21 PROCEDURE — 93308 TTE F-UP OR LMTD: CPT

## 2018-04-21 PROCEDURE — 80053 COMPREHEN METABOLIC PANEL: CPT

## 2018-04-21 PROCEDURE — 84132 ASSAY OF SERUM POTASSIUM: CPT

## 2018-04-21 PROCEDURE — 85730 THROMBOPLASTIN TIME PARTIAL: CPT

## 2018-04-21 PROCEDURE — 81001 URINALYSIS AUTO W/SCOPE: CPT

## 2018-04-21 RX ORDER — ALBUTEROL 90 UG/1
2 AEROSOL, METERED ORAL
Qty: 1 | Refills: 0 | OUTPATIENT
Start: 2018-04-21 | End: 2018-05-04

## 2018-04-21 RX ORDER — NAPROXEN AND ESOMEPRAZOLE MAGNESIUM 375; 20 MG/1; MG/1
1 TABLET, DELAYED RELEASE ORAL
Qty: 0 | Refills: 0 | COMMUNITY

## 2018-04-21 RX ORDER — BUDESONIDE AND FORMOTEROL FUMARATE DIHYDRATE 160; 4.5 UG/1; UG/1
2 AEROSOL RESPIRATORY (INHALATION)
Qty: 0 | Refills: 0 | Status: DISCONTINUED | OUTPATIENT
Start: 2018-04-21 | End: 2018-04-21

## 2018-04-21 RX ADMIN — VALSARTAN 320 MILLIGRAM(S): 80 TABLET ORAL at 05:08

## 2018-04-21 RX ADMIN — Medication 100 MILLIGRAM(S): at 05:09

## 2018-04-21 RX ADMIN — Medication 650 MILLIGRAM(S): at 10:51

## 2018-04-21 RX ADMIN — Medication 100 MILLIGRAM(S): at 17:21

## 2018-04-21 RX ADMIN — BUDESONIDE AND FORMOTEROL FUMARATE DIHYDRATE 2 PUFF(S): 160; 4.5 AEROSOL RESPIRATORY (INHALATION) at 12:40

## 2018-04-21 RX ADMIN — Medication 650 MILLIGRAM(S): at 11:50

## 2018-04-21 RX ADMIN — CEFTRIAXONE 100 GRAM(S): 500 INJECTION, POWDER, FOR SOLUTION INTRAMUSCULAR; INTRAVENOUS at 12:38

## 2018-04-21 RX ADMIN — AZITHROMYCIN 250 MILLIGRAM(S): 500 TABLET, FILM COATED ORAL at 12:39

## 2018-04-21 NOTE — DISCHARGE NOTE ADULT - PATIENT PORTAL LINK FT
You can access the [facility] Patient Portal, offered by [facility], by registering with the following website: [email or web address]

## 2018-04-21 NOTE — DISCHARGE NOTE ADULT - FINDINGS/TREATMENT
10.5   8.59  )-----------( 60       ( 20 Apr 2018 07:57 )             31.6 < from: Xray Abdomen 1 View PORTABLE -Urgent (04.19.18 @ 21:38) >    FINDINGS:  Nonobstructive bowel gas pattern.  Cholelithiasis.  There is no evidence of intraperitoneal free air.  There is no evidence of organomegaly or pathologic calcification.  Dextroscoliosis. Degenerative change of spine.    Visualized lower chest somewhat.    IMPRESSION:   Nonobstructive bowel gas pattern.      < end of copied text >

## 2018-04-21 NOTE — DISCHARGE NOTE ADULT - OTHER SIGNIFICANT FINDINGS
< from: TTE with Doppler (w/Cont) (04.20.18 @ 15:32) >  Fractional short: 35 %  EF (Visual Estimate): 50 %  Doppler Peak Velocity (m/sec): AoV=2.6  ------------------------------------------------------------------------  Observations:  Mitral Valve: Mitral annular calcification, otherwise  normal mitral valve. Mild-moderate mitral regurgitation.  Aortic Valve/Aorta: Calcified trileaflet aortic valve with  decreased opening. Peak transaortic valve gradient equals  27 mm Hg, mean transaortic valve gradient equals 12 mm Hg,  estimated aortic valve area equals 1.2 sqcm (by continuity  equation), aortic valve velocity time integral equals 52  cm, consistent with moderate aortic stenosis.  Aortic Root: 3.1 cm.  LVOT diameter: 2.1 cm.  Left Atrium: Normal leftatrium.  LA volume index = 17  cc/m2.  Left Ventricle: Mild-moderate  segmental left ventricular  systolic dysfunction. The mid-distal  inferolateral wall,  and the lateral wall are hypokinetic. The basal inferior  wall, and the basal inferoseptum are akinetic.  The  remaining segments are hyperdynamic.  Increased relative  wall thickness with normal left ventricular mass index,  consistent with concentric left ventricular remodeling.  Moderate diastolic dysfunction (Stage II).  Right Heart: Normal right atrium. Normal right ventricular  size and function. Normal tricuspid valve. Mild tricuspid  regurgitation. Normal pulmonic valve.  Pericardium/Pleura: Normal pericardium with no pericardial  effusion.  Hemodynamic: Estimated right atrial pressure is 8 mm Hg.  Estimated right ventricular systolic pressure equals 39 mm  Hg, assuming right atrial pressure equals 8 mm Hg,  consistent with borderline pulmonary hypertension.  ------------------------------------------------------------------------  Conclusions:  1. Calcified trileaflet aortic valve with decreased  opening. Peak transaortic valve gradient equals 27 mm Hg,  mean transaortic valve gradient equals 12 mm Hg, estimated  aortic valve area equals 1.2 sqcm (by continuity equation),  aortic valve velocity time integral equals 52 cm,  consistent with moderate aortic stenosis.  2. Increased relative wall thickness with normal left  ventricular mass index, consistent with concentric left  ventricular remodeling.  3. Mild-moderate  segmental left ventricular systolic  dysfunction. The mid-distal  inferolateral wall, and the  lateral wall are hypokinetic. The basal inferior wall, and  the basal inferoseptum are akinetic.  The remaining  segments are hyperdynamic.    < from: CT Chest No Cont (04.19.18 @ 23:15) >  Impression: Patchy opacities are present in the peripheral aspect of both   lungs. Primary consideration is infection.      < end of copied text >    < from: US TTE 2D F/U, Limited w/o Contrast (ED) (04.19.18 @ 13:31) >  IMPRESSION:   No Pericardial Effusion. No pleural effusions. Diffuse bilateral apical   and axillary b-lines.     < end of copied text >

## 2018-04-21 NOTE — PROGRESS NOTE ADULT - PROBLEM SELECTOR PLAN 1
patient with respiratory distress requiring BiPaP. likely 2/2 to suspected pneumonia. CXR negative for consolidation.  -will have BiPaP PRN, will assess patient on NC  -CT chest w/ bilateral opacities  -will continue with ceftriaxone  -continue with azithromycin for total 3 day course likely 2/2 to suspected pneumonia. CXR negative for consolidation. now resolved  -c/w NC, not requiring bipapa  -CT chest w/ bilateral opacities  -transition to levaquin  -continue with azithromycin for total 3 day course likely 2/2 to suspected pneumonia. CXR negative for consolidation. now resolved  -c/w NC, not requiring bipap  -CT chest w/ bilateral opacities  -transition to levaquin  -continue with azithromycin for total 3 day course

## 2018-04-21 NOTE — DISCHARGE NOTE ADULT - ADDITIONAL INSTRUCTIONS
Please follow up wi Please follow up with our General Medicine clinic at [address] [place] [address]. The phone number is [phone].  Please follow up with our gastroenterologists for an evaluation for the difficulty swallowing. The phone number is [phone].  Please follow up with our cardiologists regarding your decreased heart function. The phone number is [phone].  Please follow up with our hematologists regarding your low platelets. Please follow up with our General Medicine clinic at [address], [place]. The phone number is [phone].  Please follow up with our gastroenterologists for an evaluation for the difficulty swallowing. The phone number is [phone].  Please follow up with our cardiologists regarding your decreased heart function. The phone number is [phone].  Please follow up with our hematologists regarding your low platelets. You can contact the [facility], where our hematologists are located, at [phone].  Please take the antibiotic levaquin for 2 days. Please also take the tamiflu medication for 2 days as well. Please follow up with our General Medicine clinic at [address] [place], [place]. The phone number is [phone].  Please follow up with our gastroenterologists for an evaluation for the difficulty swallowing. The phone number is [phone].  Please follow up with our cardiologists regarding your decreased heart function. The phone number is [phone].  Please follow up with our hematologists regarding your low platelets. You can contact the [facility], where our hematologists are located, at [phone].  Please take the antibiotic levaquin for 2 days. Please also take the tamiflu medication for 2 days as well. You can also take the albuterol nebulizer as needed for your shortness of breath.

## 2018-04-21 NOTE — DISCHARGE NOTE ADULT - HOSPITAL COURSE
80 yo F w/ hx of HTN and arthritis p/w 2-3 days of worsening SOB and chest pain. Per son, recently had the flu 3 weeks prior, was treated with tamiflu and antibiotics, symptoms resolved. Went to [place], and when came back SOB worsened again. Received Z-pack and prednisone from PCP, did not improve, and was therefore told to go to ED by PCP. In the ED, found to be in respiratory distress w/ diffuse B lines on POCUS. RVP positive for influenza. CXR showed clear lungs. was started on BiPaP, respiratory status improved. also started on ceftriaxone and azithromycin, and given Lasix 40 Iv x1, vanc 1g x1, and tamiflu. CT chest was ordered which showed patchy opacities present in peripheral lungs bilaterally. Patient improved significantly the next day on antibiotics. Patient also complained of abdominal discomfort. AXR ordered showed no obstruction. Patient subsequently had diarrhea, C. diff sent was negative. Urine legionella sent was negative. TTE was done to rule out CHF and showed EF 50% with mild-moderate LV dysfunction and moderate AS. PT evaluated patient and recommended home PT. Of note, patient was complaining of dry mouth, difficulty swallowing ongoing for the past 2 months. Patient is also noted to have thrombocytopenia of unknown length of time. Patient was advised to f/u with PCP, hematology, cardiology and GI for possible endoscopic evaluation. Patient was stabilized and discharged with levaquin for 2 days to complete 5 day course as well as PCP, hematology, cardiology and GI. 80 yo F w/ hx of HTN and arthritis p/w 2-3 days of worsening SOB and chest pain. Per son, recently had the flu 3 weeks prior, was treated with tamiflu and antibiotics, symptoms resolved. Went to [place], and when came back SOB worsened again. Received Z-pack and prednisone from PCP, did not improve, and was therefore told to go to ED by PCP. In the ED, found to be in respiratory distress w/ diffuse B lines on POCUS. RVP positive for influenza. CXR showed clear lungs. was started on BiPaP, respiratory status improved. also started on ceftriaxone and azithromycin, and given Lasix 40 Iv x1, vanc 1g x1, and tamiflu. CT chest was ordered which showed patchy opacities present in peripheral lungs bilaterally. Patient improved significantly the next day on antibiotics. Patient also complained of abdominal discomfort. AXR ordered showed no obstruction. Patient subsequently had diarrhea, C. diff sent was negative. Urine legionella sent was negative. TTE was done to rule out CHF and showed EF 50% with mild-moderate LV dysfunction and moderate AS. PT evaluated patient and recommended home PT. Of note, patient was complaining of dry mouth, difficulty swallowing ongoing for the past 2 months. Patient is also noted to have thrombocytopenia of unknown length of time. Patient was advised to f/u with PCP, hematology, cardiology and GI for possible endoscopic evaluation. Patient was stabilized and discharged with levaquin for 2 days  to complete 5 day course as well as tamiflu for 2 days to complete 5 day course as well as PCP, hematology, cardiology and GI. 80 yo F w/ hx of HTN and arthritis p/w 2-3 days of worsening SOB and chest pain. Per son, recently had the flu 3 weeks prior, was treated with tamiflu and antibiotics, symptoms resolved. Went to [place], and when came back SOB worsened again. Received Z-pack and prednisone from PCP, did not improve, and was therefore told to go to ED by PCP. In the ED, found to be in respiratory distress w/ diffuse B lines on POCUS. RVP positive for influenza. CXR showed clear lungs. was started on BiPaP, respiratory status improved. also started on ceftriaxone and azithromycin, and given Lasix 40 Iv x1, vanc 1g x1, and tamiflu. CT chest was ordered which showed patchy opacities present in peripheral lungs bilaterally. Patient improved significantly the next day on antibiotics. Patient also complained of abdominal discomfort. AXR ordered showed no obstruction. Patient subsequently had diarrhea, C. diff sent was negative. Urine legionella sent was negative. TTE was done to rule out CHF and showed EF 50% with mild-moderate LV dysfunction and moderate AS. PT evaluated patient and recommended home PT. Of note, patient was complaining of dry mouth, difficulty swallowing ongoing for the past 2 months. Patient is also noted to have thrombocytopenia of unknown length of time. Patient was advised to f/u with PCP, hematology, cardiology and GI for possible endoscopic evaluation. Patient was stabilized and discharged with levaquin for 2 days  to complete 5 day course as well as tamiflu for 2 days to complete 5 day course as well as PCP, hematology, cardiology and GI.     Discharge condition stable.

## 2018-04-21 NOTE — DISCHARGE NOTE ADULT - CARE PROVIDER_API CALL
Halina Myrick (EUGENIE), Family Medicine  [address] [place] [id number]  Phone: [phone]  Fax: [phone]

## 2018-04-21 NOTE — DISCHARGE NOTE ADULT - MEDICATION SUMMARY - MEDICATIONS TO STOP TAKING
I will STOP taking the medications listed below when I get home from the hospital:    meclizine 25 mg oral tablet  -- 1 tab(s) by mouth every 12 hours, As Needed   -- May cause drowsiness.  Alcohol may intensify this effect.  Use care when operating dangerous machinery.    predniSONE 10 mg oral tablet  -- 1 tab(s) by mouth once a day

## 2018-04-21 NOTE — DISCHARGE NOTE ADULT - SECONDARY DIAGNOSIS.
Generalized abdominal pain Thrombocytopenia Systolic congestive heart failure, unspecified chronicity Dysphagia, unspecified type

## 2018-04-21 NOTE — PROGRESS NOTE ADULT - PROBLEM SELECTOR PLAN 2
tachycardic, elevated WBC, source likely pulmonary. likely 2/2 influenza.  -CT chest w/ bilateral opacities  -will continue with ceftriaxone  -continue with azithromycin for total 3 day course  -monitor BP  -monitor CBC likely 2/2 influenza. now resolved  -CT chest w/ bilateral opacities  -transition to levaquin  -continue with azithromycin for total 3 day course  -monitor BP  -monitor CBC

## 2018-04-21 NOTE — DISCHARGE NOTE ADULT - MEDICATION SUMMARY - MEDICATIONS TO TAKE
I will START or STAY ON the medications listed below when I get home from the hospital:    rolling walker  -- Indication: For Ambulation    valsartan 320 mg oral tablet  -- 1 tab(s) by mouth once a day  -- Indication: For hypertension    promethazine 25 mg/5 mL oral syrup  -- 5 milliliter(s) by mouth every 6 hours  -- Indication: For Antiemetic    oseltamivir 75 mg oral capsule  -- 1 cap(s) by mouth 2 times a day   -- Check with your doctor before becoming pregnant.  Finish all this medication unless otherwise directed by prescriber.    -- Indication: For influenza b    labetalol 200 mg oral tablet  -- 1 tab(s) by mouth once a day  -- Indication: For hypertension    Symbicort 160 mcg-4.5 mcg/inh inhalation aerosol  -- 2 puff(s) inhaled 2 times a day  -- Indication: For Shortness of breath    Levaquin 750 mg oral tablet  -- 1 tab(s) by mouth once a day   -- Avoid prolonged or excessive exposure to direct and/or artificial sunlight while taking this medication.  Do not take dairy products, antacids, or iron preparations within one hour of this medication.  Finish all this medication unless otherwise directed by prescriber.  May cause drowsiness or dizziness.  Medication should be taken with plenty of water.    -- Indication: For Pneumonia    fluticasone  -- Indication: For nasal congestion I will START or STAY ON the medications listed below when I get home from the hospital:    rolling walker  -- Indication: For Ambulation    valsartan 320 mg oral tablet  -- 1 tab(s) by mouth once a day  -- Indication: For hypertension    promethazine 25 mg/5 mL oral syrup  -- 5 milliliter(s) by mouth every 6 hours  -- Indication: For Antiemetic    oseltamivir 75 mg oral capsule  -- 1 cap(s) by mouth 2 times a day   -- Check with your doctor before becoming pregnant.  Finish all this medication unless otherwise directed by prescriber.    -- Indication: For influenza b    labetalol 200 mg oral tablet  -- 1 tab(s) by mouth once a day  -- Indication: For hypertension    Symbicort 160 mcg-4.5 mcg/inh inhalation aerosol  -- 2 puff(s) inhaled 2 times a day  -- Indication: For Shortness of breath    albuterol 90 mcg/inh inhalation powder  -- 2 puff(s) inhaled every 6 hours, As Needed   -- For inhalation only.  It is very important that you take or use this exactly as directed.  Do not skip doses or discontinue unless directed by your doctor.  Obtain medical advice before taking any non-prescription drugs as some may affect the action of this medication.    -- Indication: For Shortness of breath    Levaquin 750 mg oral tablet  -- 1 tab(s) by mouth once a day   -- Avoid prolonged or excessive exposure to direct and/or artificial sunlight while taking this medication.  Do not take dairy products, antacids, or iron preparations within one hour of this medication.  Finish all this medication unless otherwise directed by prescriber.  May cause drowsiness or dizziness.  Medication should be taken with plenty of water.    -- Indication: For Pneumonia    fluticasone  -- Indication: For nasal congestion

## 2018-04-21 NOTE — DISCHARGE NOTE ADULT - CARE PLAN
Principal Discharge DX:	Pneumonia  Goal:	Improvement of symptoms  Secondary Diagnosis:	Generalized abdominal pain  Secondary Diagnosis:	Thrombocytopenia  Secondary Diagnosis:	Systolic congestive heart failure, unspecified chronicity Principal Discharge DX:	Pneumonia  Goal:	Improvement of symptoms  Assessment and plan of treatment:	You were found to have a pneumonia, and you had the flu as well. we have given you tamiflu as well as antibiotics. you will need to take the antibiotic levaquin for 2 days starting tomorrow. you will also need to continue to take the tamiflu for 2 more days as well.  Secondary Diagnosis:	Generalized abdominal pain  Goal:	Improvement of symptoms  Assessment and plan of treatment:	You were having abdominal discomfort. We did an abdominal x-ray and you do not have any abdominal obstruction. You later had diarrhea, and we checked for C. difficile and it was negative.  Secondary Diagnosis:	Thrombocytopenia  Goal:	Improvement of blood counts  Assessment and plan of treatment:	You were found to have low platelet levels when you came to the hospital. We are not sure why your platelet levels are so low. Please follow up with a hematologist after discharge from the hospital.  Secondary Diagnosis:	Systolic congestive heart failure, unspecified chronicity  Goal:	Improvement of symptoms  Assessment and plan of treatment:	You were found to have a decreased heart darcy on your left ventricle. We found this through an ultrasound of your heart. You will need to follow up with a cardiologist after discharge from the hospital.  Secondary Diagnosis:	Dysphagia, unspecified type  Goal:	Improvement of symptoms  Assessment and plan of treatment:	You report that you have been having difficulty swallowing, dry mouth, and decreased appetite for the past 2 months. We recommend that you follow up with a stomach doctor after discharge from the hospital. Principal Discharge DX:	Pneumonia  Goal:	Improvement of symptoms  Assessment and plan of treatment:	You were found to have a pneumonia, and you had the flu as well. we have given you tamiflu as well as antibiotics. you will need to take the antibiotic levaquin for 2 days starting tomorrow. you will also need to continue to take the tamiflu for 2 more days as well. you can also take the albuterol nebulizers to help you with your breathing as needed.  Secondary Diagnosis:	Generalized abdominal pain  Goal:	Improvement of symptoms  Assessment and plan of treatment:	You were having abdominal discomfort. We did an abdominal x-ray and you do not have any abdominal obstruction. You later had diarrhea, and we checked for C. difficile and it was negative.  Secondary Diagnosis:	Thrombocytopenia  Goal:	Improvement of blood counts  Assessment and plan of treatment:	You were found to have low platelet levels when you came to the hospital. We are not sure why your platelet levels are so low. Please follow up with a hematologist after discharge from the hospital.  Secondary Diagnosis:	Systolic congestive heart failure, unspecified chronicity  Goal:	Improvement of symptoms  Assessment and plan of treatment:	You were found to have a decreased heart darcy on your left ventricle. We found this through an ultrasound of your heart. You will need to follow up with a cardiologist after discharge from the hospital.  Secondary Diagnosis:	Dysphagia, unspecified type  Goal:	Improvement of symptoms  Assessment and plan of treatment:	You report that you have been having difficulty swallowing, dry mouth, and decreased appetite for the past 2 months. We recommend that you follow up with a stomach doctor after discharge from the hospital.

## 2018-04-21 NOTE — PROGRESS NOTE ADULT - ASSESSMENT
80 yo F w/ hx of HTN p/w worsening dyspnea and chest pain likely 2/2 viral PNA vs. CHF exacerbation.
80 yo F w/ hx of HTN p/w worsening dyspnea and chest pain likely 2/2 viral PNA vs. CHF exacerbation.

## 2018-04-21 NOTE — DISCHARGE NOTE ADULT - PLAN OF CARE
Improvement of symptoms You were found to have a pneumonia, and you had the flu as well. we have given you tamiflu as well as antibiotics. you will need to take the antibiotic levaquin for 2 days starting tomorrow. you will also need to continue to take the tamiflu for 2 more days as well. You were having abdominal discomfort. We did an abdominal x-ray and you do not have any abdominal obstruction. You later had diarrhea, and we checked for C. difficile and it was negative. Improvement of blood counts You were found to have low platelet levels when you came to the hospital. We are not sure why your platelet levels are so low. Please follow up with a hematologist after discharge from the hospital. You were found to have a decreased heart darcy on your left ventricle. We found this through an ultrasound of your heart. You will need to follow up with a cardiologist after discharge from the hospital. You report that you have been having difficulty swallowing, dry mouth, and decreased appetite for the past 2 months. We recommend that you follow up with a stomach doctor after discharge from the hospital. You were found to have a pneumonia, and you had the flu as well. we have given you tamiflu as well as antibiotics. you will need to take the antibiotic levaquin for 2 days starting tomorrow. you will also need to continue to take the tamiflu for 2 more days as well. you can also take the albuterol nebulizers to help you with your breathing as needed.

## 2018-04-21 NOTE — PROGRESS NOTE ADULT - PROBLEM SELECTOR PLAN 4
pt with new abdominal distention and discomfort. likely 2/2 constipation  -will order AXR to rule out obstruction  -serial abdominal exams pt with new abdominal distention and discomfort. likely 2/2 constipation  -AXR - no obstruction  -serial abdominal exams

## 2018-04-21 NOTE — PROGRESS NOTE ADULT - SUBJECTIVE AND OBJECTIVE BOX
CONTACT INFO  Praveen Zepeda MD PGY 1  Pager: NS- [phone], [place]- [phone]    Mon-Fri: pager covered by day team 7am-7pm;   ***Academic conferences M-F 8am-9am & 12pm-1pm- page ONLY if URGENT or if Consultant  /Tala: see chart, primary physician assigned available 7am-12pm  Sat/[place] Coverage 12pm-7pm: NS- page [phone] for Team1-4, [facility]- pager forwarded to covering Resident  For Night coverage 7pm-7am: NS- page [phone] Team1-3, page [phone] Team4 & Care Model    >>> <<<    cc: f/u for     SUBJECTIVE / OVERNIGHT EVENTS:      MEDICATIONS  (STANDING):  azithromycin  IVPB 500 milliGRAM(s) IV Intermittent every 24 hours  cefTRIAXone   IVPB 1 Gram(s) IV Intermittent every 24 hours  enoxaparin Injectable 40 milliGRAM(s) SubCutaneous every 24 hours  labetalol 100 milliGRAM(s) Oral two times a day  valsartan 320 milliGRAM(s) Oral daily    MEDICATIONS  (PRN):  acetaminophen   Tablet. 650 milliGRAM(s) Oral every 6 hours PRN Mild Pain (1 - 3)  traMADol 25 milliGRAM(s) Oral once PRN Severe Pain (7 - 10)      T(F): 98.4 (18 @ 03:51), Max: 98.4 (18 @ 03:51)  HR: 67 (18 @ 03:51) (65 - 96)  BP: 155/81 (18 @ 03:51) (128/71 - 164/72)  RR: 18 (18 @ 03:51) (18 - 18)  SpO2: 97% (18 @ 03:51) (77% - 97%)    18 @ 07:01  -  18 @ 07:00  --------------------------------------------------------  IN: 120 mL / OUT: 0 mL / NET: 120 mL    18 @ 07:01  -  18 @ 05:28  --------------------------------------------------------  IN: 480 mL / OUT: 350 mL / NET: 130 mL      CAPILLARY BLOOD GLUCOSE            PHYSICAL EXAM:          LABS:                        10.5   8.59  )-----------( 60       ( 2018 07:57 )             31.6         141  |  104  |  8   ----------------------------<  114<H>  3.4<L>   |  25  |  0.50    Ca    8.3<L>      2018 05:47  Phos  3.1       Mg     1.9         TPro  7.8  /  Alb  3.7  /  TBili  0.6  /  DBili  x   /  AST  33  /  ALT  21  /  AlkPhos  102      PT/INR - ( 2018 13:10 )   PT: 10.8 sec;   INR: 1.00 ratio         PTT - ( 2018 13:10 )  PTT:23.2 sec  CARDIAC MARKERS ( 2018 20:18 )  x     / <0.01 ng/mL / 65 U/L / x     / 2.6 ng/mL  CARDIAC MARKERS ( 2018 13:10 )  x     / <0.01 ng/mL / x     / x     / x          Urinalysis Basic - ( 2018 13:11 )    Color: Yellow / Appearance: Clear / S.013 / pH: x  Gluc: x / Ketone: Negative  / Bili: Negative / Urobili: Negative   Blood: x / Protein: 30 mg/dL / Nitrite: Negative   Leuk Esterase: Negative / RBC: x / WBC 3-5 /HPF   Sq Epi: x / Non Sq Epi: OCC /HPF / Bacteria: x            RADIOLOGY & ADDITIONAL TESTS:    Imaging Personally Reviewed:  Consultant(s) Notes Reviewed:    Care Discussed with Consultants/Other Providers: CONTACT INFO  Praveen Zepeda MD PGY 1  Pager: NS- [phone], [place]- [phone]    Mon-Fri: pager covered by day team 7am-7pm;   ***Academic conferences M-F 8am-9am & 12pm-1pm- page ONLY if URGENT or if Consultant  /Tala: see chart, primary physician assigned available 7am-12pm  Sat/[place] Coverage 12pm-7pm: NS- page [phone] for Team1-4, [facility]- pager forwarded to covering Resident  For Night coverage 7pm-7am: NS- page [phone] Team1-3, page [phone] Team4 & Care Model    >>> <<<    cc: f/u for viral PNA    SUBJECTIVE / OVERNIGHT EVENTS: patient feels improved. still having cough +SANON. -cp. -abd pain.    MEDICATIONS  (STANDING):  azithromycin  IVPB 500 milliGRAM(s) IV Intermittent every 24 hours  cefTRIAXone   IVPB 1 Gram(s) IV Intermittent every 24 hours  enoxaparin Injectable 40 milliGRAM(s) SubCutaneous every 24 hours  labetalol 100 milliGRAM(s) Oral two times a day  valsartan 320 milliGRAM(s) Oral daily    MEDICATIONS  (PRN):  acetaminophen   Tablet. 650 milliGRAM(s) Oral every 6 hours PRN Mild Pain (1 - 3)  traMADol 25 milliGRAM(s) Oral once PRN Severe Pain (7 - 10)      T(F): 98.4 (18 @ 03:51), Max: 98.4 (18 @ 03:51)  HR: 67 (18 @ 03:51) (65 - 96)  BP: 155/81 (18 @ 03:51) (128/71 - 164/72)  RR: 18 (18 @ 03:51) (18 - 18)  SpO2: 97% (18 @ 03:51) (77% - 97%)    18 @ 07:01  -  18 @ 07:00  --------------------------------------------------------  IN: 120 mL / OUT: 0 mL / NET: 120 mL    18 @ 07:01  -  18 @ 05:28  --------------------------------------------------------  IN: 480 mL / OUT: 350 mL / NET: 130 mL      CAPILLARY BLOOD GLUCOSE            PHYSICAL EXAM:    General: NAD, sitting in bed comfortably eating.  Respiratory: Equal air entry. no wheezing,  crackles now mostly at bases, appears to have improved.  Cardiovascular: S1, S2. no murmurs, rubs, or gallops  Gastrointestinal: soft, discomfort to palpation, and with distention. tympanic to percussion. BS + no peritoneal sign   Extremity- No clubbing , cyanosis or edema   Neurological: A+O x3. No FND  Skin: no rashes noted, or lesion   Musculoskeletal: no appreciable joint swelling noted. ROM - intact   Psychiatric: A+O x3    LABS:                        10.5   8.59  )-----------( 60       ( 2018 07:57 )             31.6     04-20    141  |  104  |  8   ----------------------------<  114<H>  3.4<L>   |  25  |  0.50    Ca    8.3<L>      2018 05:47  Phos  3.1     -  Mg     1.9     -    TPro  7.8  /  Alb  3.7  /  TBili  0.6  /  DBili  x   /  AST  33  /  ALT  21  /  AlkPhos  102  04-19    PT/INR - ( 2018 13:10 )   PT: 10.8 sec;   INR: 1.00 ratio         PTT - ( 2018 13:10 )  PTT:23.2 sec  CARDIAC MARKERS ( 2018 20:18 )  x     / <0.01 ng/mL / 65 U/L / x     / 2.6 ng/mL  CARDIAC MARKERS ( 2018 13:10 )  x     / <0.01 ng/mL / x     / x     / x          Urinalysis Basic - ( 2018 13:11 )    Color: Yellow / Appearance: Clear / S.013 / pH: x  Gluc: x / Ketone: Negative  / Bili: Negative / Urobili: Negative   Blood: x / Protein: 30 mg/dL / Nitrite: Negative   Leuk Esterase: Negative / RBC: x / WBC 3-5 /HPF   Sq Epi: x / Non Sq Epi: OCC /HPF / Bacteria: x        RADIOLOGY & ADDITIONAL TESTS:    Imaging Personally Reviewed:  Consultant(s) Notes Reviewed:    Care Discussed with Consultants/Other Providers: CONTACT INFO  Praveen Zepeda MD PGY 1  Pager: NS- [phone], [place]- [phone]    Mon-Fri: pager covered by day team 7am-7pm;   ***Academic conferences M-F 8am-9am & 12pm-1pm- page ONLY if URGENT or if Consultant  /[place]: see chart, primary physician assigned available 7am-12pm  Sat/[place] Coverage 12pm-7pm: NS- page [phone] for Team1-4, [facility]- pager forwarded to covering Resident  For Night coverage 7pm-7am: NS- page [phone] Team1-3, page [phone] Team4 & Care Model    >>> <<<    cc: f/u for viral PNA    SUBJECTIVE / OVERNIGHT EVENTS: patient feels improved. still having cough +SANON. -cp. -abd pain.    MEDICATIONS  (STANDING):  azithromycin  IVPB 500 milliGRAM(s) IV Intermittent every 24 hours  cefTRIAXone   IVPB 1 Gram(s) IV Intermittent every 24 hours  enoxaparin Injectable 40 milliGRAM(s) SubCutaneous every 24 hours  labetalol 100 milliGRAM(s) Oral two times a day  valsartan 320 milliGRAM(s) Oral daily    MEDICATIONS  (PRN):  acetaminophen   Tablet. 650 milliGRAM(s) Oral every 6 hours PRN Mild Pain (1 - 3)  traMADol 25 milliGRAM(s) Oral once PRN Severe Pain (7 - 10)      T(F): 98.4 (18 @ 03:51), Max: 98.4 (18 @ 03:51)  HR: 67 (18 @ 03:51) (65 - 96)  BP: 155/81 (18 @ 03:51) (128/71 - 164/72)  RR: 18 (18 @ 03:51) (18 - 18)  SpO2: 97% (18 @ 03:51) (77% - 97%)    18 @ 07:01  -  18 @ 07:00  --------------------------------------------------------  IN: 120 mL / OUT: 0 mL / NET: 120 mL    18 @ 07:01  -  18 @ 05:28  --------------------------------------------------------  IN: 480 mL / OUT: 350 mL / NET: 130 mL      CAPILLARY BLOOD GLUCOSE      PHYSICAL EXAM:    General: NAD, sitting in bed comfortably eating.  Respiratory: Equal air entry. no wheezing,  crackles now mostly at bases, appears to have improved.  Cardiovascular: S1, S2. no murmurs, rubs, or gallops  Gastrointestinal: soft, discomfort to palpation, and with distention. tympanic to percussion. BS + no peritoneal sign   Extremity- No clubbing , cyanosis or edema   Neurological: A+O x3. No FND  Skin: no rashes noted, or lesion   Musculoskeletal: no appreciable joint swelling noted. ROM - intact   Psychiatric: A+O x3    LABS:                        10.5   8.59  )-----------( 60       ( 2018 07:57 )             31.6     04-20    141  |  104  |  8   ----------------------------<  114<H>  3.4<L>   |  25  |  0.50    Ca    8.3<L>      2018 05:47  Phos  3.1     -  Mg     1.9     -    TPro  7.8  /  Alb  3.7  /  TBili  0.6  /  DBili  x   /  AST  33  /  ALT  21  /  AlkPhos  102  04-19    PT/INR - ( 2018 13:10 )   PT: 10.8 sec;   INR: 1.00 ratio         PTT - ( 2018 13:10 )  PTT:23.2 sec  CARDIAC MARKERS ( 2018 20:18 )  x     / <0.01 ng/mL / 65 U/L / x     / 2.6 ng/mL  CARDIAC MARKERS ( 2018 13:10 )  x     / <0.01 ng/mL / x     / x     / x          Urinalysis Basic - ( 2018 13:11 )    Color: Yellow / Appearance: Clear / S.013 / pH: x  Gluc: x / Ketone: Negative  / Bili: Negative / Urobili: Negative   Blood: x / Protein: 30 mg/dL / Nitrite: Negative   Leuk Esterase: Negative / RBC: x / WBC 3-5 /HPF   Sq Epi: x / Non Sq Epi: OCC /HPF / Bacteria: x        RADIOLOGY & ADDITIONAL TESTS:    Imaging Personally Reviewed:  Consultant(s) Notes Reviewed:    Care Discussed with Consultants/Other Providers:

## 2018-04-21 NOTE — PROGRESS NOTE ADULT - PROBLEM SELECTOR PLAN 5
unclear cause of thrombocytopenia. per records, noted to have platelets of 58 in November  -continue to monitor CBC  -consider hematology consult to assess etiology of isolated thrombocytopenia unclear cause of thrombocytopenia. per records, noted to have platelets of 58 in November  -continue to monitor CBC  -pt can f/u outpatient w/ hematology

## 2018-04-21 NOTE — PROGRESS NOTE ADULT - PROBLEM SELECTOR PLAN 3
RVP positive of influenza B. CXR clear. on exam has diffuse crackles. 2D TTE showing diffuse B lines. No peripheral edema noted on exam. though with EF low per son, no records to confirm. BNP mildly elevated. WBC elevated with lymphocyte predominant on diff. troponins negative. Less likely pt has CHF exacerbation. patient requiring BiPaP for respiratory distress.   -will c/w tamiflu for now, though likely outside of 48 hour window  -c/w NC, BiPAP prn  -will order TTE to assess EF to r/o CHF  -CT chest w/ b/l opacities RVP positive of influenza B. CXR clear. on exam has diffuse crackles. 2D TTE showing diffuse B lines. No peripheral edema noted on exam. BNP mildly elevated. troponins negative. Less likely pt has CHF exacerbation. patient requiring BiPaP for respiratory distress.   -will c/w tamiflu   -c/w NC, BiPAP prn  -TTE - EF 50%, global LV dysfunction  -CT chest w/ b/l opacities

## 2018-04-21 NOTE — PROGRESS NOTE ADULT - PROBLEM SELECTOR PLAN 7
DVT - lovenox  PT consult DVT - lovenox  PT consult - home /w home pt DVT - lovenox  PT consult - home w/ home pt. discuss w/ CM

## 2018-04-24 LAB
CULTURE RESULTS: SIGNIFICANT CHANGE UP
SPECIMEN SOURCE: SIGNIFICANT CHANGE UP

## 2018-09-07 PROBLEM — I10 ESSENTIAL (PRIMARY) HYPERTENSION: Chronic | Status: ACTIVE | Noted: 2017-11-05

## 2018-09-07 PROBLEM — M19.90 UNSPECIFIED OSTEOARTHRITIS, UNSPECIFIED SITE: Chronic | Status: ACTIVE | Noted: 2017-11-05

## 2018-09-07 PROBLEM — M48.00 SPINAL STENOSIS, SITE UNSPECIFIED: Chronic | Status: ACTIVE | Noted: 2017-11-05

## 2018-09-07 NOTE — ED PROCEDURE NOTE - [id number]_ED_A_ED
EMG/NCS done. See Procedure Note for results.     Krupa Cruz MD Attending Attestation (For Attendings USE Only)...

## 2018-09-21 ENCOUNTER — APPOINTMENT (OUTPATIENT)
Dept: GASTROENTEROLOGY | Facility: CLINIC | Age: 83
End: 2018-09-21

## 2018-10-09 ENCOUNTER — APPOINTMENT (OUTPATIENT)
Dept: GASTROENTEROLOGY | Facility: CLINIC | Age: 83
End: 2018-10-09

## 2020-01-01 ENCOUNTER — APPOINTMENT (OUTPATIENT)
Dept: NEUROLOGY | Facility: CLINIC | Age: 85
End: 2020-01-01

## 2020-01-01 ENCOUNTER — APPOINTMENT (OUTPATIENT)
Dept: ORTHOPEDIC SURGERY | Facility: CLINIC | Age: 85
End: 2020-01-01

## 2020-10-21 NOTE — DISCHARGE NOTE ADULT - FUNCTIONAL SCREEN CURRENT LEVEL: BATHING, MLM
AD for longer distances currently               Manual Intervention ([phone])                                          NMR re-education (38039)   CUES NEEDED                                                         Therapeutic Activity (54259)                                                Therapeutic Exercise and NMR EXR  [x] (94850) Provided verbal/tactile cueing for activities related to strengthening, flexibility, endurance, ROM for improvements in LE, proximal hip, and core control with self care, mobility, lifting, ambulation.  [] (25042) Provided verbal/tactile cueing for activities related to improving balance, coordination, kinesthetic sense, posture, motor skill, proprioception  to assist with LE, proximal hip, and core control in self care, mobility, lifting, ambulation and eccentric single leg control.      NMR and Therapeutic Activities:    [] (88273 or 46821) Provided verbal/tactile cueing for activities related to improving balance, coordination, kinesthetic sense, posture, motor skill, proprioception and motor activation to allow for proper function of core, proximal hip and LE with self care and ADLs  [] (30104) Gait Re-education- Provided training and instruction to the patient for proper LE, core and proximal hip recruitment and positioning and eccentric body weight control with ambulation re-education including up and down stairs     Home Exercise Program:    [x] (49451) Reviewed/Progressed HEP activities related to strengthening, flexibility, endurance, ROM of core, proximal hip and LE for functional self-care, mobility, lifting and ambulation/stair navigation   [] (23474)Reviewed/Progressed HEP activities related to improving balance, coordination, kinesthetic sense, posture, motor skill, proprioception of core, proximal hip and LE for self care, mobility, lifting, and ambulation/stair navigation      Manual Treatments:  PROM / STM / Oscillations-Mobs:  G-I, II, III, IV (PA's, Inf., Post.)  [] (03380) Provided manual therapy to mobilize LE, proximal hip and/or LS spine soft tissue/joints for the purpose of modulating pain, promoting relaxation,  increasing ROM, reducing/eliminating soft tissue swelling/inflammation/restriction, improving soft tissue extensibility and allowing for proper ROM for normal function with self care, mobility, lifting and ambulation. Modalities:     [] GAME READY (VASO)- for significant edema, swelling, pain control. Charges:  Timed Code Treatment Minutes: 20'   Total Treatment Minutes: 35'     [x] EVAL (LOW) 68967   [] EVAL (MOD) 44959  [] EVAL (HIGH) 78337   [] RE-EVAL     [x] RC(84800) x 1    [] IONTO  [] NMR (42893) x     [] VASO  [] Manual (80454) x      [] Other:  [] TA x      [] Mech Traction (44842)  [] ES(attended) (92421)      [] ES (un) (17121):       GOALS:   Patient stated goal: \"To get back to my normal activities. \"  []? Progressing: []? Met: []? Not Met: []? Adjusted     Therapist goals for Patient:   Short Term Goals: To be achieved in: 2 weeks  1. Independent in HEP and progression per patient tolerance, in order to prevent re-injury. []? Progressing: []? Met: []? Not Met: []? Adjusted  2. Patient will have a decrease in pain to facilitate improvement in movement, function, and ADLs as indicated by Functional Deficits. []? Progressing: []? Met: []? Not Met: []? Adjusted     Long Term Goals: To be achieved in: 4 weeks  1. Disability index score of 9% or less for the LEFS to assist with reaching prior level of function. []? Progressing: []? Met: []? Not Met: []? Adjusted  2. Patient will demonstrate increased AROM to 0 - 135 degrees to allow for proper joint functioning as indicated by patients Functional Deficits. []? Progressing: []? Met: []? Not Met: []? Adjusted  3. Patient will demonstrate an increase in Strength in right knee flexion and extension to 4+/5 to allow for proper functional mobility as indicated by patients Functional Deficits.  []? Progressing: []? Met: []? Not Met: []? Adjusted  4. 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 []? Progressing: []? Met: []? Not Met: []? Adjusted     Overall Progression Towards Functional goals/ Treatment Progress Update:  [] Patient is progressing as expected towards functional goals listed. [] Progression is slowed due to complexities/Impairments listed. [] Progression has been slowed due to co-morbidities. [x] Plan just implemented, too soon to assess goals progression <30days   [] Goals require adjustment due to lack of progress  [] Patient is not progressing as expected and requires additional follow up with physician  [] Other    Prognosis for POC: [x] Good [] Fair  [] Poor      Patient requires continued skilled intervention: [x] Yes  [] No    Treatment/Activity Tolerance:  [x] Patient able to complete treatment  [] Patient limited by fatigue  [] Patient limited by pain    [] Patient limited by other medical complications  [] Other:     ASSESSMENT: see eval       PLAN: See eval  [] Continue per plan of care [] Alter current plan (see comments above)  [x] Plan of care initiated [] Hold pending MD visit [] Discharge      Electronically signed by:  Alice White, PT, DPT [id number]     Note: If patient does not return for scheduled/ recommended follow up visits, this note will serve as a discharge from care along with most recent update on progress. (2) assistive person

## 2021-01-01 ENCOUNTER — EMERGENCY (EMERGENCY)
Facility: HOSPITAL | Age: 86
LOS: 1 days | Discharge: ANOTHER HOSPITAL | End: 2021-01-01
Attending: EMERGENCY MEDICINE | Admitting: EMERGENCY MEDICINE
Payer: MEDICARE

## 2021-01-01 ENCOUNTER — TRANSCRIPTION ENCOUNTER (OUTPATIENT)
Age: 86
End: 2021-01-01

## 2021-01-01 ENCOUNTER — INPATIENT (INPATIENT)
Facility: HOSPITAL | Age: 86
LOS: 2 days | DRG: 283 | End: 2021-06-08
Attending: STUDENT IN AN ORGANIZED HEALTH CARE EDUCATION/TRAINING PROGRAM | Admitting: HOSPITALIST
Payer: MEDICARE

## 2021-01-01 ENCOUNTER — RESULT REVIEW (OUTPATIENT)
Age: 86
End: 2021-01-01

## 2021-01-01 VITALS
DIASTOLIC BLOOD PRESSURE: 73 MMHG | HEIGHT: 63 IN | HEART RATE: 110 BPM | WEIGHT: 152.12 LBS | TEMPERATURE: 98 F | SYSTOLIC BLOOD PRESSURE: 132 MMHG | RESPIRATION RATE: 30 BRPM | OXYGEN SATURATION: 100 %

## 2021-01-01 VITALS
DIASTOLIC BLOOD PRESSURE: 80 MMHG | HEART RATE: 74 BPM | OXYGEN SATURATION: 96 % | RESPIRATION RATE: 16 BRPM | SYSTOLIC BLOOD PRESSURE: 118 MMHG | TEMPERATURE: 98 F

## 2021-01-01 VITALS
RESPIRATION RATE: 24 BRPM | TEMPERATURE: 100 F | SYSTOLIC BLOOD PRESSURE: 112 MMHG | OXYGEN SATURATION: 100 % | DIASTOLIC BLOOD PRESSURE: 56 MMHG | HEART RATE: 103 BPM

## 2021-01-01 VITALS — OXYGEN SATURATION: 91 % | RESPIRATION RATE: 24 BRPM

## 2021-01-01 DIAGNOSIS — R06.03 ACUTE RESPIRATORY DISTRESS: ICD-10-CM

## 2021-01-01 DIAGNOSIS — I35.0 NONRHEUMATIC AORTIC (VALVE) STENOSIS: ICD-10-CM

## 2021-01-01 DIAGNOSIS — N39.0 URINARY TRACT INFECTION, SITE NOT SPECIFIED: ICD-10-CM

## 2021-01-01 DIAGNOSIS — I50.30 UNSPECIFIED DIASTOLIC (CONGESTIVE) HEART FAILURE: ICD-10-CM

## 2021-01-01 DIAGNOSIS — E11.9 TYPE 2 DIABETES MELLITUS WITHOUT COMPLICATIONS: ICD-10-CM

## 2021-01-01 DIAGNOSIS — A41.9 SEPSIS, UNSPECIFIED ORGANISM: ICD-10-CM

## 2021-01-01 DIAGNOSIS — Z29.9 ENCOUNTER FOR PROPHYLACTIC MEASURES, UNSPECIFIED: ICD-10-CM

## 2021-01-01 DIAGNOSIS — I10 ESSENTIAL (PRIMARY) HYPERTENSION: ICD-10-CM

## 2021-01-01 DIAGNOSIS — H26.9 UNSPECIFIED CATARACT: Chronic | ICD-10-CM

## 2021-01-01 DIAGNOSIS — R65.10 SYSTEMIC INFLAMMATORY RESPONSE SYNDROME (SIRS) OF NON-INFECTIOUS ORIGIN WITHOUT ACUTE ORGAN DYSFUNCTION: ICD-10-CM

## 2021-01-01 DIAGNOSIS — I25.10 ATHEROSCLEROTIC HEART DISEASE OF NATIVE CORONARY ARTERY WITHOUT ANGINA PECTORIS: ICD-10-CM

## 2021-01-01 DIAGNOSIS — D72.829 ELEVATED WHITE BLOOD CELL COUNT, UNSPECIFIED: ICD-10-CM

## 2021-01-01 DIAGNOSIS — R50.9 FEVER, UNSPECIFIED: ICD-10-CM

## 2021-01-01 DIAGNOSIS — I21.4 NON-ST ELEVATION (NSTEMI) MYOCARDIAL INFARCTION: ICD-10-CM

## 2021-01-01 DIAGNOSIS — D75.89 OTHER SPECIFIED DISEASES OF BLOOD AND BLOOD-FORMING ORGANS: ICD-10-CM

## 2021-01-01 LAB
A1C WITH ESTIMATED AVERAGE GLUCOSE RESULT: 9.7 % — HIGH (ref 4–5.6)
ALBUMIN SERPL ELPH-MCNC: 2.6 G/DL — LOW (ref 3.3–5)
ALBUMIN SERPL ELPH-MCNC: 2.9 G/DL — LOW (ref 3.3–5)
ALBUMIN SERPL ELPH-MCNC: 3 G/DL — LOW (ref 3.3–5)
ALBUMIN SERPL ELPH-MCNC: 3.1 G/DL — LOW (ref 3.3–5)
ALBUMIN SERPL ELPH-MCNC: 3.2 G/DL — LOW (ref 3.3–5)
ALBUMIN SERPL ELPH-MCNC: 3.2 G/DL — LOW (ref 3.3–5)
ALP SERPL-CCNC: 108 U/L — SIGNIFICANT CHANGE UP (ref 40–120)
ALP SERPL-CCNC: 127 U/L — HIGH (ref 40–120)
ALP SERPL-CCNC: 87 U/L — SIGNIFICANT CHANGE UP (ref 40–120)
ALP SERPL-CCNC: 87 U/L — SIGNIFICANT CHANGE UP (ref 40–120)
ALP SERPL-CCNC: 92 U/L — SIGNIFICANT CHANGE UP (ref 40–120)
ALP SERPL-CCNC: 98 U/L — SIGNIFICANT CHANGE UP (ref 40–120)
ALT FLD-CCNC: 155 U/L — HIGH (ref 10–45)
ALT FLD-CCNC: 22 U/L — SIGNIFICANT CHANGE UP (ref 10–45)
ALT FLD-CCNC: 24 U/L — SIGNIFICANT CHANGE UP (ref 10–45)
ALT FLD-CCNC: 25 U/L — SIGNIFICANT CHANGE UP (ref 10–45)
ALT FLD-CCNC: 27 U/L — SIGNIFICANT CHANGE UP (ref 12–78)
ALT FLD-CCNC: 65 U/L — HIGH (ref 10–45)
ANION GAP SERPL CALC-SCNC: 12 MMOL/L — SIGNIFICANT CHANGE UP (ref 5–17)
ANION GAP SERPL CALC-SCNC: 13 MMOL/L — SIGNIFICANT CHANGE UP (ref 5–17)
ANION GAP SERPL CALC-SCNC: 15 MMOL/L — SIGNIFICANT CHANGE UP (ref 5–17)
ANION GAP SERPL CALC-SCNC: 33 MMOL/L — HIGH (ref 5–17)
ANION GAP SERPL CALC-SCNC: 35 MMOL/L — HIGH (ref 5–17)
APPEARANCE UR: ABNORMAL
APPEARANCE UR: CLEAR — SIGNIFICANT CHANGE UP
APTT BLD: 26.9 SEC — LOW (ref 27.5–35.5)
APTT BLD: 28.4 SEC — SIGNIFICANT CHANGE UP (ref 27.5–35.5)
APTT BLD: 29.6 SEC — SIGNIFICANT CHANGE UP (ref 27.5–35.5)
AST SERPL-CCNC: 207 U/L — HIGH (ref 10–40)
AST SERPL-CCNC: 39 U/L — SIGNIFICANT CHANGE UP (ref 10–40)
AST SERPL-CCNC: 42 U/L — HIGH (ref 15–37)
AST SERPL-CCNC: 45 U/L — HIGH (ref 10–40)
AST SERPL-CCNC: 58 U/L — HIGH (ref 10–40)
AST SERPL-CCNC: 673 U/L — HIGH (ref 10–40)
BACTERIA # UR AUTO: NEGATIVE — SIGNIFICANT CHANGE UP
BASE EXCESS BLDV CALC-SCNC: -1.6 MMOL/L — SIGNIFICANT CHANGE UP (ref -2–2)
BASE EXCESS BLDV CALC-SCNC: -20.3 MMOL/L — LOW (ref -2–2)
BASOPHILS # BLD AUTO: 0 K/UL — SIGNIFICANT CHANGE UP (ref 0–0.2)
BASOPHILS # BLD AUTO: 0 K/UL — SIGNIFICANT CHANGE UP (ref 0–0.2)
BASOPHILS NFR BLD AUTO: 0 % — SIGNIFICANT CHANGE UP (ref 0–2)
BASOPHILS NFR BLD AUTO: 0 % — SIGNIFICANT CHANGE UP (ref 0–2)
BILIRUB SERPL-MCNC: 0.9 MG/DL — SIGNIFICANT CHANGE UP (ref 0.2–1.2)
BILIRUB SERPL-MCNC: 1.1 MG/DL — SIGNIFICANT CHANGE UP (ref 0.2–1.2)
BILIRUB SERPL-MCNC: 1.8 MG/DL — HIGH (ref 0.2–1.2)
BILIRUB SERPL-MCNC: 1.8 MG/DL — HIGH (ref 0.2–1.2)
BILIRUB SERPL-MCNC: 2.1 MG/DL — HIGH (ref 0.2–1.2)
BILIRUB SERPL-MCNC: 2.2 MG/DL — HIGH (ref 0.2–1.2)
BILIRUB UR-MCNC: NEGATIVE — SIGNIFICANT CHANGE UP
BILIRUB UR-MCNC: NEGATIVE — SIGNIFICANT CHANGE UP
BLASTS # FLD: 16 % — HIGH (ref 0–0)
BLD GP AB SCN SERPL QL: NEGATIVE — SIGNIFICANT CHANGE UP
BLD GP AB SCN SERPL QL: NEGATIVE — SIGNIFICANT CHANGE UP
BUN SERPL-MCNC: 14 MG/DL — SIGNIFICANT CHANGE UP (ref 7–23)
BUN SERPL-MCNC: 16 MG/DL — SIGNIFICANT CHANGE UP (ref 7–23)
BUN SERPL-MCNC: 22 MG/DL — SIGNIFICANT CHANGE UP (ref 7–23)
BUN SERPL-MCNC: 30 MG/DL — HIGH (ref 7–23)
BUN SERPL-MCNC: 36 MG/DL — HIGH (ref 7–23)
BUN SERPL-MCNC: 40 MG/DL — HIGH (ref 7–23)
CA-I SERPL-SCNC: 1.11 MMOL/L — LOW (ref 1.12–1.3)
CA-I SERPL-SCNC: 1.17 MMOL/L — SIGNIFICANT CHANGE UP (ref 1.12–1.3)
CALCIUM SERPL-MCNC: 8.5 MG/DL — SIGNIFICANT CHANGE UP (ref 8.4–10.5)
CALCIUM SERPL-MCNC: 8.5 MG/DL — SIGNIFICANT CHANGE UP (ref 8.5–10.1)
CALCIUM SERPL-MCNC: 8.7 MG/DL — SIGNIFICANT CHANGE UP (ref 8.4–10.5)
CALCIUM SERPL-MCNC: 8.7 MG/DL — SIGNIFICANT CHANGE UP (ref 8.4–10.5)
CALCIUM SERPL-MCNC: 9.1 MG/DL — SIGNIFICANT CHANGE UP (ref 8.4–10.5)
CALCIUM SERPL-MCNC: 9.2 MG/DL — SIGNIFICANT CHANGE UP (ref 8.4–10.5)
CHLORIDE BLDV-SCNC: 100 MMOL/L — SIGNIFICANT CHANGE UP (ref 96–108)
CHLORIDE BLDV-SCNC: 102 MMOL/L — SIGNIFICANT CHANGE UP (ref 96–108)
CHLORIDE SERPL-SCNC: 100 MMOL/L — SIGNIFICANT CHANGE UP (ref 96–108)
CHLORIDE SERPL-SCNC: 88 MMOL/L — LOW (ref 96–108)
CHLORIDE SERPL-SCNC: 88 MMOL/L — LOW (ref 96–108)
CHLORIDE SERPL-SCNC: 91 MMOL/L — LOW (ref 96–108)
CHLORIDE SERPL-SCNC: 96 MMOL/L — SIGNIFICANT CHANGE UP (ref 96–108)
CHLORIDE SERPL-SCNC: 99 MMOL/L — SIGNIFICANT CHANGE UP (ref 96–108)
CHOLEST SERPL-MCNC: 70 MG/DL — SIGNIFICANT CHANGE UP
CK MB BLD-MCNC: 4.7 % — HIGH (ref 0–3.5)
CK MB BLD-MCNC: 8.1 % — HIGH (ref 0–3.5)
CK MB BLD-MCNC: 8.6 % — HIGH (ref 0–3.5)
CK MB CFR SERPL CALC: 10.3 NG/ML — HIGH (ref 0–3.8)
CK MB CFR SERPL CALC: 17.9 NG/ML — HIGH (ref 0–3.8)
CK MB CFR SERPL CALC: 18.2 NG/ML — HIGH (ref 0–3.8)
CK MB CFR SERPL CALC: 4.4 NG/ML — HIGH (ref 0–3.6)
CK MB CFR SERPL CALC: 8.7 NG/ML — HIGH (ref 0–3.8)
CK SERPL-CCNC: 212 U/L — HIGH (ref 25–170)
CK SERPL-CCNC: 218 U/L — HIGH (ref 25–170)
CK SERPL-CCNC: 222 U/L — HIGH (ref 25–170)
CO2 BLDV-SCNC: 23 MMOL/L — SIGNIFICANT CHANGE UP (ref 22–30)
CO2 BLDV-SCNC: 9 MMOL/L — LOW (ref 22–30)
CO2 SERPL-SCNC: 11 MMOL/L — LOW (ref 22–31)
CO2 SERPL-SCNC: 18 MMOL/L — LOW (ref 22–31)
CO2 SERPL-SCNC: 19 MMOL/L — LOW (ref 22–31)
CO2 SERPL-SCNC: 21 MMOL/L — LOW (ref 22–31)
CO2 SERPL-SCNC: <10 MMOL/L — CRITICAL LOW (ref 22–31)
CO2 SERPL-SCNC: <10 MMOL/L — CRITICAL LOW (ref 22–31)
COLOR SPEC: SIGNIFICANT CHANGE UP
COLOR SPEC: YELLOW — SIGNIFICANT CHANGE UP
COMMENT - URINE: SIGNIFICANT CHANGE UP
CREAT SERPL-MCNC: 0.64 MG/DL — SIGNIFICANT CHANGE UP (ref 0.5–1.3)
CREAT SERPL-MCNC: 0.91 MG/DL — SIGNIFICANT CHANGE UP (ref 0.5–1.3)
CREAT SERPL-MCNC: 1.06 MG/DL — SIGNIFICANT CHANGE UP (ref 0.5–1.3)
CREAT SERPL-MCNC: 1.43 MG/DL — HIGH (ref 0.5–1.3)
CREAT SERPL-MCNC: 2.11 MG/DL — HIGH (ref 0.5–1.3)
CREAT SERPL-MCNC: 2.47 MG/DL — HIGH (ref 0.5–1.3)
CULTURE RESULTS: SIGNIFICANT CHANGE UP
CULTURE RESULTS: SIGNIFICANT CHANGE UP
DIFF PNL FLD: ABNORMAL
DIFF PNL FLD: ABNORMAL
EOSINOPHIL # BLD AUTO: 0 K/UL — SIGNIFICANT CHANGE UP (ref 0–0.5)
EOSINOPHIL # BLD AUTO: 0 K/UL — SIGNIFICANT CHANGE UP (ref 0–0.5)
EOSINOPHIL NFR BLD AUTO: 0 % — SIGNIFICANT CHANGE UP (ref 0–6)
EOSINOPHIL NFR BLD AUTO: 0 % — SIGNIFICANT CHANGE UP (ref 0–6)
EPI CELLS # UR: 0 /HPF — SIGNIFICANT CHANGE UP
ESTIMATED AVERAGE GLUCOSE: 232 MG/DL — HIGH (ref 68–114)
FERRITIN SERPL-MCNC: 312 NG/ML — HIGH (ref 15–150)
FIBRINOGEN PPP-MCNC: 559 MG/DL — HIGH (ref 290–520)
FOLATE SERPL-MCNC: >20 NG/ML — SIGNIFICANT CHANGE UP
GAS PNL BLDA: SIGNIFICANT CHANGE UP
GAS PNL BLDA: SIGNIFICANT CHANGE UP
GAS PNL BLDV: 129 MMOL/L — LOW (ref 135–145)
GAS PNL BLDV: 130 MMOL/L — LOW (ref 135–145)
GAS PNL BLDV: SIGNIFICANT CHANGE UP
GLUCOSE BLDC GLUCOMTR-MCNC: 133 MG/DL — HIGH (ref 70–99)
GLUCOSE BLDC GLUCOMTR-MCNC: 159 MG/DL — HIGH (ref 70–99)
GLUCOSE BLDV-MCNC: 225 MG/DL — HIGH (ref 70–99)
GLUCOSE BLDV-MCNC: 287 MG/DL — HIGH (ref 70–99)
GLUCOSE SERPL-MCNC: 166 MG/DL — HIGH (ref 70–99)
GLUCOSE SERPL-MCNC: 225 MG/DL — HIGH (ref 70–99)
GLUCOSE SERPL-MCNC: 234 MG/DL — HIGH (ref 70–99)
GLUCOSE SERPL-MCNC: 259 MG/DL — HIGH (ref 70–99)
GLUCOSE SERPL-MCNC: 294 MG/DL — HIGH (ref 70–99)
GLUCOSE SERPL-MCNC: 74 MG/DL — SIGNIFICANT CHANGE UP (ref 70–99)
GLUCOSE UR QL: ABNORMAL
GLUCOSE UR QL: NEGATIVE — SIGNIFICANT CHANGE UP
GRAM STN FLD: SIGNIFICANT CHANGE UP
HAPTOGLOB SERPL-MCNC: 158 MG/DL — SIGNIFICANT CHANGE UP (ref 34–200)
HAV IGM SER-ACNC: SIGNIFICANT CHANGE UP
HBV CORE AB SER-ACNC: SIGNIFICANT CHANGE UP
HBV CORE IGM SER-ACNC: SIGNIFICANT CHANGE UP
HBV SURFACE AB SER-ACNC: SIGNIFICANT CHANGE UP
HBV SURFACE AG SER-ACNC: SIGNIFICANT CHANGE UP
HCO3 BLDV-SCNC: 22 MMOL/L — SIGNIFICANT CHANGE UP (ref 21–29)
HCO3 BLDV-SCNC: 8 MMOL/L — LOW (ref 21–29)
HCT VFR BLD CALC: 23.5 % — LOW (ref 34.5–45)
HCT VFR BLD CALC: 23.8 % — LOW (ref 34.5–45)
HCT VFR BLD CALC: 24.7 % — LOW (ref 34.5–45)
HCT VFR BLD CALC: 24.9 % — LOW (ref 34.5–45)
HCT VFR BLD CALC: 25 % — LOW (ref 34.5–45)
HCT VFR BLD CALC: 27.6 % — LOW (ref 34.5–45)
HCT VFR BLD CALC: 28 % — LOW (ref 34.5–45)
HCT VFR BLDA CALC: 24 % — LOW (ref 39–50)
HCT VFR BLDA CALC: 26 % — LOW (ref 39–50)
HCV AB S/CO SERPL IA: 0.14 S/CO — SIGNIFICANT CHANGE UP (ref 0–0.99)
HCV AB SERPL-IMP: SIGNIFICANT CHANGE UP
HDLC SERPL-MCNC: 27 MG/DL — LOW
HGB BLD CALC-MCNC: 7.7 G/DL — LOW (ref 11.5–15.5)
HGB BLD CALC-MCNC: 8.2 G/DL — LOW (ref 11.5–15.5)
HGB BLD-MCNC: 7.3 G/DL — LOW (ref 11.5–15.5)
HGB BLD-MCNC: 7.5 G/DL — LOW (ref 11.5–15.5)
HGB BLD-MCNC: 7.8 G/DL — LOW (ref 11.5–15.5)
HGB BLD-MCNC: 7.8 G/DL — LOW (ref 11.5–15.5)
HGB BLD-MCNC: 8.1 G/DL — LOW (ref 11.5–15.5)
HGB BLD-MCNC: 8.1 G/DL — LOW (ref 11.5–15.5)
HGB BLD-MCNC: 8.8 G/DL — LOW (ref 11.5–15.5)
HIV 1+2 AB+HIV1 P24 AG SERPL QL IA: SIGNIFICANT CHANGE UP
INR BLD: 1.17 RATIO — HIGH (ref 0.88–1.16)
INR BLD: 1.19 RATIO — HIGH (ref 0.88–1.16)
INR BLD: 1.41 RATIO — HIGH (ref 0.88–1.16)
IRON SATN MFR SERPL: 25 UG/DL — LOW (ref 30–160)
IRON SATN MFR SERPL: 9 % — LOW (ref 14–50)
KETONES UR-MCNC: ABNORMAL
KETONES UR-MCNC: SIGNIFICANT CHANGE UP
LACTATE BLDV-MCNC: 18 MMOL/L — CRITICAL HIGH (ref 0.7–2)
LACTATE BLDV-MCNC: 4.6 MMOL/L — CRITICAL HIGH (ref 0.7–2)
LACTATE SERPL-SCNC: 18.4 MMOL/L — CRITICAL HIGH (ref 0.7–2)
LACTATE SERPL-SCNC: 3 MMOL/L — HIGH (ref 0.7–2)
LACTATE SERPL-SCNC: 3.4 MMOL/L — HIGH (ref 0.7–2)
LACTATE SERPL-SCNC: 3.9 MMOL/L — HIGH (ref 0.7–2)
LACTATE SERPL-SCNC: 4.7 MMOL/L — CRITICAL HIGH (ref 0.7–2)
LACTATE SERPL-SCNC: 4.9 MMOL/L — CRITICAL HIGH (ref 0.7–2)
LDH SERPL L TO P-CCNC: 273 U/L — HIGH (ref 50–242)
LDH SERPL L TO P-CCNC: 296 U/L — HIGH (ref 50–242)
LDH SERPL L TO P-CCNC: 382 U/L — HIGH (ref 50–242)
LEUKOCYTE ESTERASE UR-ACNC: ABNORMAL
LEUKOCYTE ESTERASE UR-ACNC: NEGATIVE — SIGNIFICANT CHANGE UP
LIPID PNL WITH DIRECT LDL SERPL: 20 MG/DL — SIGNIFICANT CHANGE UP
LYMPHOCYTES # BLD AUTO: 42 % — SIGNIFICANT CHANGE UP (ref 13–44)
LYMPHOCYTES # BLD AUTO: 49.33 K/UL — HIGH (ref 1–3.3)
LYMPHOCYTES # BLD AUTO: 9.72 K/UL — HIGH (ref 1–3.3)
LYMPHOCYTES # BLD AUTO: 93 % — HIGH (ref 13–44)
LYMPHOCYTES # SPEC AUTO: 4 % — HIGH (ref 0–0)
MAGNESIUM SERPL-MCNC: 1.8 MG/DL — SIGNIFICANT CHANGE UP (ref 1.6–2.6)
MAGNESIUM SERPL-MCNC: 2.3 MG/DL — SIGNIFICANT CHANGE UP (ref 1.6–2.6)
MAGNESIUM SERPL-MCNC: 2.4 MG/DL — SIGNIFICANT CHANGE UP (ref 1.6–2.6)
MAGNESIUM SERPL-MCNC: 2.8 MG/DL — HIGH (ref 1.6–2.6)
MAGNESIUM SERPL-MCNC: 3 MG/DL — HIGH (ref 1.6–2.6)
MAGNESIUM SERPL-MCNC: 3.2 MG/DL — HIGH (ref 1.6–2.6)
MANUAL DIF COMMENT BLD-IMP: SIGNIFICANT CHANGE UP
MANUAL SMEAR VERIFICATION: SIGNIFICANT CHANGE UP
MANUAL SMEAR VERIFICATION: SIGNIFICANT CHANGE UP
MCHC RBC-ENTMCNC: 27.7 PG — SIGNIFICANT CHANGE UP (ref 27–34)
MCHC RBC-ENTMCNC: 28.1 PG — SIGNIFICANT CHANGE UP (ref 27–34)
MCHC RBC-ENTMCNC: 28.1 PG — SIGNIFICANT CHANGE UP (ref 27–34)
MCHC RBC-ENTMCNC: 28.4 PG — SIGNIFICANT CHANGE UP (ref 27–34)
MCHC RBC-ENTMCNC: 28.7 PG — SIGNIFICANT CHANGE UP (ref 27–34)
MCHC RBC-ENTMCNC: 28.7 PG — SIGNIFICANT CHANGE UP (ref 27–34)
MCHC RBC-ENTMCNC: 28.8 PG — SIGNIFICANT CHANGE UP (ref 27–34)
MCHC RBC-ENTMCNC: 29.3 GM/DL — LOW (ref 32–36)
MCHC RBC-ENTMCNC: 29.6 GM/DL — LOW (ref 32–36)
MCHC RBC-ENTMCNC: 30.1 GM/DL — LOW (ref 32–36)
MCHC RBC-ENTMCNC: 31.4 GM/DL — LOW (ref 32–36)
MCHC RBC-ENTMCNC: 32.4 GM/DL — SIGNIFICANT CHANGE UP (ref 32–36)
MCHC RBC-ENTMCNC: 32.8 GM/DL — SIGNIFICANT CHANGE UP (ref 32–36)
MCHC RBC-ENTMCNC: 33.2 GM/DL — SIGNIFICANT CHANGE UP (ref 32–36)
MCV RBC AUTO: 85.6 FL — SIGNIFICANT CHANGE UP (ref 80–100)
MCV RBC AUTO: 86.7 FL — SIGNIFICANT CHANGE UP (ref 80–100)
MCV RBC AUTO: 88.1 FL — SIGNIFICANT CHANGE UP (ref 80–100)
MCV RBC AUTO: 88.7 FL — SIGNIFICANT CHANGE UP (ref 80–100)
MCV RBC AUTO: 94.3 FL — SIGNIFICANT CHANGE UP (ref 80–100)
MCV RBC AUTO: 95.8 FL — SIGNIFICANT CHANGE UP (ref 80–100)
MCV RBC AUTO: 97.2 FL — SIGNIFICANT CHANGE UP (ref 80–100)
MONOCYTES # BLD AUTO: 0 K/UL — SIGNIFICANT CHANGE UP (ref 0–0.9)
MONOCYTES # BLD AUTO: 0.93 K/UL — HIGH (ref 0–0.9)
MONOCYTES NFR BLD AUTO: 0 % — LOW (ref 2–14)
MONOCYTES NFR BLD AUTO: 4 % — SIGNIFICANT CHANGE UP (ref 2–14)
NEUTROPHILS # BLD AUTO: 1.59 K/UL — LOW (ref 1.8–7.4)
NEUTROPHILS # BLD AUTO: 8.56 K/UL — HIGH (ref 1.8–7.4)
NEUTROPHILS NFR BLD AUTO: 3 % — LOW (ref 43–77)
NEUTROPHILS NFR BLD AUTO: 35 % — LOW (ref 43–77)
NEUTS BAND # BLD: 2 % — SIGNIFICANT CHANGE UP (ref 0–8)
NITRITE UR-MCNC: NEGATIVE — SIGNIFICANT CHANGE UP
NITRITE UR-MCNC: NEGATIVE — SIGNIFICANT CHANGE UP
NON HDL CHOLESTEROL: 43 MG/DL — SIGNIFICANT CHANGE UP
NRBC # BLD: 1 /100 — HIGH (ref 0–0)
NRBC # BLD: 10 /100 WBCS — HIGH (ref 0–0)
NRBC # BLD: 2 /100 WBCS — HIGH (ref 0–0)
NRBC # BLD: 4 /100 WBCS — HIGH (ref 0–0)
NRBC # BLD: 4 /100 WBCS — HIGH (ref 0–0)
NRBC # BLD: 4 /100 — HIGH (ref 0–0)
NRBC # BLD: 9 /100 WBCS — HIGH (ref 0–0)
NT-PROBNP SERPL-SCNC: 1790 PG/ML — HIGH (ref 0–450)
OB PNL STL: NEGATIVE — SIGNIFICANT CHANGE UP
OTHER CELLS CSF MANUAL: 6 ML/DL — LOW (ref 18–22)
PCO2 BLDV: 31 MMHG — LOW (ref 35–50)
PCO2 BLDV: 35 MMHG — SIGNIFICANT CHANGE UP (ref 35–50)
PH BLDV: 7.06 — CRITICAL LOW (ref 7.35–7.45)
PH BLDV: 7.42 — SIGNIFICANT CHANGE UP (ref 7.35–7.45)
PH UR: 5 — SIGNIFICANT CHANGE UP (ref 5–8)
PH UR: 6 — SIGNIFICANT CHANGE UP (ref 5–8)
PHOSPHATE SERPL-MCNC: 10 MG/DL — HIGH (ref 2.5–4.5)
PHOSPHATE SERPL-MCNC: 12.1 MG/DL — HIGH (ref 2.5–4.5)
PHOSPHATE SERPL-MCNC: 2.9 MG/DL — SIGNIFICANT CHANGE UP (ref 2.5–4.5)
PHOSPHATE SERPL-MCNC: 3.2 MG/DL — SIGNIFICANT CHANGE UP (ref 2.5–4.5)
PHOSPHATE SERPL-MCNC: 3.5 MG/DL — SIGNIFICANT CHANGE UP (ref 2.5–4.5)
PHOSPHATE SERPL-MCNC: 6.8 MG/DL — HIGH (ref 2.5–4.5)
PLAT MORPH BLD: NORMAL — SIGNIFICANT CHANGE UP
PLAT MORPH BLD: NORMAL — SIGNIFICANT CHANGE UP
PLATELET # BLD AUTO: 22 K/UL — LOW (ref 150–400)
PLATELET # BLD AUTO: 23 K/UL — LOW (ref 150–400)
PLATELET # BLD AUTO: 23 K/UL — LOW (ref 150–400)
PLATELET # BLD AUTO: 25 K/UL — LOW (ref 150–400)
PLATELET # BLD AUTO: 27 K/UL — LOW (ref 150–400)
PLATELET # BLD AUTO: 27 K/UL — LOW (ref 150–400)
PLATELET # BLD AUTO: 34 K/UL — LOW (ref 150–400)
PO2 BLDV: 33 MMHG — SIGNIFICANT CHANGE UP (ref 25–45)
PO2 BLDV: 53 MMHG — HIGH (ref 25–45)
POTASSIUM BLDV-SCNC: 4.2 MMOL/L — SIGNIFICANT CHANGE UP (ref 3.5–5.3)
POTASSIUM BLDV-SCNC: 4.6 MMOL/L — SIGNIFICANT CHANGE UP (ref 3.5–5.3)
POTASSIUM SERPL-MCNC: 4 MMOL/L — SIGNIFICANT CHANGE UP (ref 3.5–5.3)
POTASSIUM SERPL-MCNC: 4.1 MMOL/L — SIGNIFICANT CHANGE UP (ref 3.5–5.3)
POTASSIUM SERPL-MCNC: 4.3 MMOL/L — SIGNIFICANT CHANGE UP (ref 3.5–5.3)
POTASSIUM SERPL-MCNC: 4.6 MMOL/L — SIGNIFICANT CHANGE UP (ref 3.5–5.3)
POTASSIUM SERPL-MCNC: 4.7 MMOL/L — SIGNIFICANT CHANGE UP (ref 3.5–5.3)
POTASSIUM SERPL-MCNC: 5.4 MMOL/L — HIGH (ref 3.5–5.3)
POTASSIUM SERPL-SCNC: 4 MMOL/L — SIGNIFICANT CHANGE UP (ref 3.5–5.3)
POTASSIUM SERPL-SCNC: 4.1 MMOL/L — SIGNIFICANT CHANGE UP (ref 3.5–5.3)
POTASSIUM SERPL-SCNC: 4.3 MMOL/L — SIGNIFICANT CHANGE UP (ref 3.5–5.3)
POTASSIUM SERPL-SCNC: 4.6 MMOL/L — SIGNIFICANT CHANGE UP (ref 3.5–5.3)
POTASSIUM SERPL-SCNC: 4.7 MMOL/L — SIGNIFICANT CHANGE UP (ref 3.5–5.3)
POTASSIUM SERPL-SCNC: 5.4 MMOL/L — HIGH (ref 3.5–5.3)
PROCALCITONIN SERPL-MCNC: 0.32 NG/ML — HIGH (ref 0.02–0.1)
PROT SERPL-MCNC: 7.9 G/DL — SIGNIFICANT CHANGE UP (ref 6–8.3)
PROT SERPL-MCNC: 8 G/DL — SIGNIFICANT CHANGE UP (ref 6–8.3)
PROT SERPL-MCNC: 8.1 G/DL — SIGNIFICANT CHANGE UP (ref 6–8.3)
PROT SERPL-MCNC: 8.2 G/DL — SIGNIFICANT CHANGE UP (ref 6–8.3)
PROT SERPL-MCNC: 9.1 G/DL — HIGH (ref 6–8.3)
PROT SERPL-MCNC: 9.3 G/DL — HIGH (ref 6–8.3)
PROT UR-MCNC: 30 MG/DL
PROT UR-MCNC: ABNORMAL
PROTHROM AB SERPL-ACNC: 13.8 SEC — HIGH (ref 10.6–13.6)
PROTHROM AB SERPL-ACNC: 13.9 SEC — HIGH (ref 10.6–13.6)
PROTHROM AB SERPL-ACNC: 16.6 SEC — HIGH (ref 10.6–13.6)
RAPID RVP RESULT: SIGNIFICANT CHANGE UP
RBC # BLD: 2.54 M/UL — LOW (ref 3.8–5.2)
RBC # BLD: 2.64 M/UL — LOW (ref 3.8–5.2)
RBC # BLD: 2.71 M/UL — LOW (ref 3.8–5.2)
RBC # BLD: 2.78 M/UL — LOW (ref 3.8–5.2)
RBC # BLD: 2.82 M/UL — LOW (ref 3.8–5.2)
RBC # BLD: 2.82 M/UL — LOW (ref 3.8–5.2)
RBC # BLD: 2.88 M/UL — LOW (ref 3.8–5.2)
RBC # BLD: 3.18 M/UL — LOW (ref 3.8–5.2)
RBC # FLD: 17.3 % — HIGH (ref 10.3–14.5)
RBC # FLD: 17.3 % — HIGH (ref 10.3–14.5)
RBC # FLD: 17.6 % — HIGH (ref 10.3–14.5)
RBC # FLD: 17.8 % — HIGH (ref 10.3–14.5)
RBC # FLD: 18.5 % — HIGH (ref 10.3–14.5)
RBC # FLD: 18.6 % — HIGH (ref 10.3–14.5)
RBC # FLD: 18.7 % — HIGH (ref 10.3–14.5)
RBC BLD AUTO: SIGNIFICANT CHANGE UP
RBC BLD AUTO: SIGNIFICANT CHANGE UP
RBC CASTS # UR COMP ASSIST: SIGNIFICANT CHANGE UP /HPF (ref 0–4)
RETICS #: 56.1 K/UL — SIGNIFICANT CHANGE UP (ref 25–125)
RETICS/RBC NFR: 2 % — SIGNIFICANT CHANGE UP (ref 0.5–2.5)
RH IG SCN BLD-IMP: POSITIVE — SIGNIFICANT CHANGE UP
RH IG SCN BLD-IMP: POSITIVE — SIGNIFICANT CHANGE UP
SAO2 % BLDV: 58 % — LOW (ref 67–88)
SAO2 % BLDV: 68 % — SIGNIFICANT CHANGE UP (ref 67–88)
SARS-COV-2 RNA SPEC QL NAA+PROBE: SIGNIFICANT CHANGE UP
SODIUM SERPL-SCNC: 129 MMOL/L — LOW (ref 135–145)
SODIUM SERPL-SCNC: 130 MMOL/L — LOW (ref 135–145)
SODIUM SERPL-SCNC: 131 MMOL/L — LOW (ref 135–145)
SODIUM SERPL-SCNC: 133 MMOL/L — LOW (ref 135–145)
SODIUM SERPL-SCNC: 134 MMOL/L — LOW (ref 135–145)
SODIUM SERPL-SCNC: 135 MMOL/L — SIGNIFICANT CHANGE UP (ref 135–145)
SP GR SPEC: 1.01 — SIGNIFICANT CHANGE UP (ref 1.01–1.02)
SP GR SPEC: 1.03 — HIGH (ref 1.01–1.02)
SPECIMEN SOURCE: SIGNIFICANT CHANGE UP
TIBC SERPL-MCNC: 296 UG/DL — SIGNIFICANT CHANGE UP (ref 220–430)
TRIGL SERPL-MCNC: 116 MG/DL — SIGNIFICANT CHANGE UP
TROPONIN I SERPL-MCNC: 0.49 NG/ML — HIGH (ref 0.01–0.04)
TROPONIN T, HIGH SENSITIVITY RESULT: 1417 NG/L — HIGH (ref 0–51)
TROPONIN T, HIGH SENSITIVITY RESULT: 422 NG/L — HIGH (ref 0–51)
TROPONIN T, HIGH SENSITIVITY RESULT: 463 NG/L — HIGH (ref 0–51)
TROPONIN T, HIGH SENSITIVITY RESULT: 576 NG/L — HIGH (ref 0–51)
TROPONIN T, HIGH SENSITIVITY RESULT: 739 NG/L — HIGH (ref 0–51)
TSH SERPL-MCNC: 1.97 UIU/ML — SIGNIFICANT CHANGE UP (ref 0.27–4.2)
UIBC SERPL-MCNC: 271 UG/DL — SIGNIFICANT CHANGE UP (ref 110–370)
URATE SERPL-MCNC: 4.5 MG/DL — SIGNIFICANT CHANGE UP (ref 2.5–7)
URATE SERPL-MCNC: 5.5 MG/DL — SIGNIFICANT CHANGE UP (ref 2.5–7)
URATE SERPL-MCNC: 7.4 MG/DL — HIGH (ref 2.5–7)
UROBILINOGEN FLD QL: NEGATIVE — SIGNIFICANT CHANGE UP
UROBILINOGEN FLD QL: NEGATIVE — SIGNIFICANT CHANGE UP
VARIANT LYMPHS # BLD: 1 % — SIGNIFICANT CHANGE UP (ref 0–6)
VIT B12 SERPL-MCNC: 1384 PG/ML — HIGH (ref 232–1245)
WBC # BLD: 17.68 K/UL — HIGH (ref 3.8–10.5)
WBC # BLD: 22.47 K/UL — HIGH (ref 3.8–10.5)
WBC # BLD: 23.14 K/UL — HIGH (ref 3.8–10.5)
WBC # BLD: 24.48 K/UL — HIGH (ref 3.8–10.5)
WBC # BLD: 37.05 K/UL — HIGH (ref 3.8–10.5)
WBC # BLD: 40.49 K/UL — CRITICAL HIGH (ref 3.8–10.5)
WBC # BLD: 53.04 K/UL — CRITICAL HIGH (ref 3.8–10.5)
WBC # FLD AUTO: 17.68 K/UL — HIGH (ref 3.8–10.5)
WBC # FLD AUTO: 22.47 K/UL — HIGH (ref 3.8–10.5)
WBC # FLD AUTO: 23.14 K/UL — HIGH (ref 3.8–10.5)
WBC # FLD AUTO: 24.48 K/UL — HIGH (ref 3.8–10.5)
WBC # FLD AUTO: 37.05 K/UL — HIGH (ref 3.8–10.5)
WBC # FLD AUTO: 40.49 K/UL — CRITICAL HIGH (ref 3.8–10.5)
WBC # FLD AUTO: 53.04 K/UL — CRITICAL HIGH (ref 3.8–10.5)
WBC UR QL: SIGNIFICANT CHANGE UP

## 2021-01-01 PROCEDURE — 99291 CRITICAL CARE FIRST HOUR: CPT

## 2021-01-01 PROCEDURE — 88342 IMHCHEM/IMCYTCHM 1ST ANTB: CPT | Mod: 26,59

## 2021-01-01 PROCEDURE — 88189 FLOWCYTOMETRY/READ 16 & >: CPT

## 2021-01-01 PROCEDURE — 88237 TISSUE CULTURE BONE MARROW: CPT

## 2021-01-01 PROCEDURE — 85025 COMPLETE CBC W/AUTO DIFF WBC: CPT

## 2021-01-01 PROCEDURE — 71275 CT ANGIOGRAPHY CHEST: CPT

## 2021-01-01 PROCEDURE — 85097 BONE MARROW INTERPRETATION: CPT

## 2021-01-01 PROCEDURE — 88305 TISSUE EXAM BY PATHOLOGIST: CPT | Mod: 26

## 2021-01-01 PROCEDURE — 81001 URINALYSIS AUTO W/SCOPE: CPT

## 2021-01-01 PROCEDURE — 93308 TTE F-UP OR LMTD: CPT | Mod: 26,GC

## 2021-01-01 PROCEDURE — 76604 US EXAM CHEST: CPT | Mod: 26

## 2021-01-01 PROCEDURE — 93306 TTE W/DOPPLER COMPLETE: CPT | Mod: 26

## 2021-01-01 PROCEDURE — 81245 FLT3 GENE: CPT

## 2021-01-01 PROCEDURE — 99292 CRITICAL CARE ADDL 30 MIN: CPT | Mod: 25

## 2021-01-01 PROCEDURE — 71045 X-RAY EXAM CHEST 1 VIEW: CPT | Mod: 26,76

## 2021-01-01 PROCEDURE — 86850 RBC ANTIBODY SCREEN: CPT

## 2021-01-01 PROCEDURE — 80074 ACUTE HEPATITIS PANEL: CPT

## 2021-01-01 PROCEDURE — 97530 THERAPEUTIC ACTIVITIES: CPT

## 2021-01-01 PROCEDURE — 36415 COLL VENOUS BLD VENIPUNCTURE: CPT

## 2021-01-01 PROCEDURE — 71045 X-RAY EXAM CHEST 1 VIEW: CPT

## 2021-01-01 PROCEDURE — 81207 BCR/ABL1 GENE MINOR BP: CPT

## 2021-01-01 PROCEDURE — 93005 ELECTROCARDIOGRAM TRACING: CPT

## 2021-01-01 PROCEDURE — 83615 LACTATE (LD) (LDH) ENZYME: CPT

## 2021-01-01 PROCEDURE — 84443 ASSAY THYROID STIM HORMONE: CPT

## 2021-01-01 PROCEDURE — 83605 ASSAY OF LACTIC ACID: CPT

## 2021-01-01 PROCEDURE — 82330 ASSAY OF CALCIUM: CPT

## 2021-01-01 PROCEDURE — 84295 ASSAY OF SERUM SODIUM: CPT

## 2021-01-01 PROCEDURE — 85610 PROTHROMBIN TIME: CPT

## 2021-01-01 PROCEDURE — 80053 COMPREHEN METABOLIC PANEL: CPT

## 2021-01-01 PROCEDURE — 83010 ASSAY OF HAPTOGLOBIN QUANT: CPT

## 2021-01-01 PROCEDURE — 82272 OCCULT BLD FECES 1-3 TESTS: CPT

## 2021-01-01 PROCEDURE — 88271 CYTOGENETICS DNA PROBE: CPT

## 2021-01-01 PROCEDURE — 76604 US EXAM CHEST: CPT | Mod: 26,GC

## 2021-01-01 PROCEDURE — 88341 IMHCHEM/IMCYTCHM EA ADD ANTB: CPT

## 2021-01-01 PROCEDURE — 86706 HEP B SURFACE ANTIBODY: CPT

## 2021-01-01 PROCEDURE — 87070 CULTURE OTHR SPECIMN AEROBIC: CPT

## 2021-01-01 PROCEDURE — 86704 HEP B CORE ANTIBODY TOTAL: CPT

## 2021-01-01 PROCEDURE — 94640 AIRWAY INHALATION TREATMENT: CPT

## 2021-01-01 PROCEDURE — 99291 CRITICAL CARE FIRST HOUR: CPT | Mod: 25

## 2021-01-01 PROCEDURE — 99222 1ST HOSP IP/OBS MODERATE 55: CPT | Mod: GC

## 2021-01-01 PROCEDURE — 85730 THROMBOPLASTIN TIME PARTIAL: CPT

## 2021-01-01 PROCEDURE — 82947 ASSAY GLUCOSE BLOOD QUANT: CPT

## 2021-01-01 PROCEDURE — 88313 SPECIAL STAINS GROUP 2: CPT

## 2021-01-01 PROCEDURE — 87086 URINE CULTURE/COLONY COUNT: CPT

## 2021-01-01 PROCEDURE — 96375 TX/PRO/DX INJ NEW DRUG ADDON: CPT | Mod: XU

## 2021-01-01 PROCEDURE — 83735 ASSAY OF MAGNESIUM: CPT

## 2021-01-01 PROCEDURE — 88313 SPECIAL STAINS GROUP 2: CPT | Mod: 26

## 2021-01-01 PROCEDURE — 88360 TUMOR IMMUNOHISTOCHEM/MANUAL: CPT | Mod: 26

## 2021-01-01 PROCEDURE — 88184 FLOWCYTOMETRY/ TC 1 MARKER: CPT

## 2021-01-01 PROCEDURE — 71275 CT ANGIOGRAPHY CHEST: CPT | Mod: 26,MA

## 2021-01-01 PROCEDURE — 82962 GLUCOSE BLOOD TEST: CPT

## 2021-01-01 PROCEDURE — 0225U NFCT DS DNA&RNA 21 SARSCOV2: CPT

## 2021-01-01 PROCEDURE — 87205 SMEAR GRAM STAIN: CPT

## 2021-01-01 PROCEDURE — 88341 IMHCHEM/IMCYTCHM EA ADD ANTB: CPT | Mod: 26,59

## 2021-01-01 PROCEDURE — 85384 FIBRINOGEN ACTIVITY: CPT

## 2021-01-01 PROCEDURE — 93010 ELECTROCARDIOGRAM REPORT: CPT

## 2021-01-01 PROCEDURE — 84550 ASSAY OF BLOOD/URIC ACID: CPT

## 2021-01-01 PROCEDURE — 96361 HYDRATE IV INFUSION ADD-ON: CPT

## 2021-01-01 PROCEDURE — 82955 ASSAY OF G6PD ENZYME: CPT

## 2021-01-01 PROCEDURE — 88280 CHROMOSOME KARYOTYPE STUDY: CPT

## 2021-01-01 PROCEDURE — 88305 TISSUE EXAM BY PATHOLOGIST: CPT

## 2021-01-01 PROCEDURE — 82565 ASSAY OF CREATININE: CPT

## 2021-01-01 PROCEDURE — 82553 CREATINE MB FRACTION: CPT

## 2021-01-01 PROCEDURE — 99292 CRITICAL CARE ADDL 30 MIN: CPT

## 2021-01-01 PROCEDURE — 88342 IMHCHEM/IMCYTCHM 1ST ANTB: CPT

## 2021-01-01 PROCEDURE — 85014 HEMATOCRIT: CPT

## 2021-01-01 PROCEDURE — 94660 CPAP INITIATION&MGMT: CPT

## 2021-01-01 PROCEDURE — 99233 SBSQ HOSP IP/OBS HIGH 50: CPT

## 2021-01-01 PROCEDURE — 74174 CTA ABD&PLVS W/CONTRAST: CPT | Mod: 26,MA

## 2021-01-01 PROCEDURE — 84132 ASSAY OF SERUM POTASSIUM: CPT

## 2021-01-01 PROCEDURE — 80061 LIPID PANEL: CPT

## 2021-01-01 PROCEDURE — 84484 ASSAY OF TROPONIN QUANT: CPT

## 2021-01-01 PROCEDURE — 86900 BLOOD TYPING SEROLOGIC ABO: CPT

## 2021-01-01 PROCEDURE — 94002 VENT MGMT INPAT INIT DAY: CPT

## 2021-01-01 PROCEDURE — 87389 HIV-1 AG W/HIV-1&-2 AB AG IA: CPT

## 2021-01-01 PROCEDURE — 99223 1ST HOSP IP/OBS HIGH 75: CPT | Mod: GC

## 2021-01-01 PROCEDURE — 82607 VITAMIN B-12: CPT

## 2021-01-01 PROCEDURE — G0452: CPT | Mod: 26

## 2021-01-01 PROCEDURE — 85018 HEMOGLOBIN: CPT

## 2021-01-01 PROCEDURE — 83880 ASSAY OF NATRIURETIC PEPTIDE: CPT

## 2021-01-01 PROCEDURE — 82728 ASSAY OF FERRITIN: CPT

## 2021-01-01 PROCEDURE — 84100 ASSAY OF PHOSPHORUS: CPT

## 2021-01-01 PROCEDURE — 99223 1ST HOSP IP/OBS HIGH 75: CPT

## 2021-01-01 PROCEDURE — 85060 BLOOD SMEAR INTERPRETATION: CPT

## 2021-01-01 PROCEDURE — 87040 BLOOD CULTURE FOR BACTERIA: CPT

## 2021-01-01 PROCEDURE — 96367 TX/PROPH/DG ADDL SEQ IV INF: CPT

## 2021-01-01 PROCEDURE — 88360 TUMOR IMMUNOHISTOCHEM/MANUAL: CPT

## 2021-01-01 PROCEDURE — 83036 HEMOGLOBIN GLYCOSYLATED A1C: CPT

## 2021-01-01 PROCEDURE — 71045 X-RAY EXAM CHEST 1 VIEW: CPT | Mod: 26,77

## 2021-01-01 PROCEDURE — 83550 IRON BINDING TEST: CPT

## 2021-01-01 PROCEDURE — 97162 PT EVAL MOD COMPLEX 30 MIN: CPT

## 2021-01-01 PROCEDURE — 74174 CTA ABD&PLVS W/CONTRAST: CPT

## 2021-01-01 PROCEDURE — 99233 SBSQ HOSP IP/OBS HIGH 50: CPT | Mod: GC

## 2021-01-01 PROCEDURE — 82550 ASSAY OF CK (CPK): CPT

## 2021-01-01 PROCEDURE — 82746 ASSAY OF FOLIC ACID SERUM: CPT

## 2021-01-01 PROCEDURE — 81261 IGH GENE REARRANGE AMP METH: CPT

## 2021-01-01 PROCEDURE — 81206 BCR/ABL1 GENE MAJOR BP: CPT

## 2021-01-01 PROCEDURE — 71045 X-RAY EXAM CHEST 1 VIEW: CPT | Mod: 26

## 2021-01-01 PROCEDURE — 86901 BLOOD TYPING SEROLOGIC RH(D): CPT

## 2021-01-01 PROCEDURE — 82803 BLOOD GASES ANY COMBINATION: CPT

## 2021-01-01 PROCEDURE — 84145 PROCALCITONIN (PCT): CPT

## 2021-01-01 PROCEDURE — 92610 EVALUATE SWALLOWING FUNCTION: CPT

## 2021-01-01 PROCEDURE — 83540 ASSAY OF IRON: CPT

## 2021-01-01 PROCEDURE — 88264 CHROMOSOME ANALYSIS 20-25: CPT

## 2021-01-01 PROCEDURE — 88275 CYTOGENETICS 100-300: CPT

## 2021-01-01 PROCEDURE — 96365 THER/PROPH/DIAG IV INF INIT: CPT | Mod: XU

## 2021-01-01 PROCEDURE — 85045 AUTOMATED RETICULOCYTE COUNT: CPT

## 2021-01-01 PROCEDURE — C8929: CPT

## 2021-01-01 PROCEDURE — 88185 FLOWCYTOMETRY/TC ADD-ON: CPT

## 2021-01-01 PROCEDURE — 82435 ASSAY OF BLOOD CHLORIDE: CPT

## 2021-01-01 PROCEDURE — 99222 1ST HOSP IP/OBS MODERATE 55: CPT

## 2021-01-01 PROCEDURE — 43753 TX GASTRO INTUB W/ASP: CPT | Mod: GC,59

## 2021-01-01 RX ORDER — ACETAMINOPHEN 500 MG
650 TABLET ORAL EVERY 6 HOURS
Refills: 0 | Status: DISCONTINUED | OUTPATIENT
Start: 2021-01-01 | End: 2021-01-01

## 2021-01-01 RX ORDER — NITROGLYCERIN 6.5 MG
0.4 CAPSULE, EXTENDED RELEASE ORAL ONCE
Refills: 0 | Status: DISCONTINUED | OUTPATIENT
Start: 2021-01-01 | End: 2021-01-01

## 2021-01-01 RX ORDER — HEPARIN SODIUM 5000 [USP'U]/ML
5000 INJECTION INTRAVENOUS; SUBCUTANEOUS ONCE
Refills: 0 | Status: DISCONTINUED | OUTPATIENT
Start: 2021-01-01 | End: 2021-01-01

## 2021-01-01 RX ORDER — IPRATROPIUM/ALBUTEROL SULFATE 18-103MCG
3 AEROSOL WITH ADAPTER (GRAM) INHALATION ONCE
Refills: 0 | Status: COMPLETED | OUTPATIENT
Start: 2021-01-01 | End: 2021-01-01

## 2021-01-01 RX ORDER — DEXTROSE 50 % IN WATER 50 %
12.5 SYRINGE (ML) INTRAVENOUS ONCE
Refills: 0 | Status: DISCONTINUED | OUTPATIENT
Start: 2021-01-01 | End: 2021-01-01

## 2021-01-01 RX ORDER — CHLORHEXIDINE GLUCONATE 213 G/1000ML
15 SOLUTION TOPICAL EVERY 12 HOURS
Refills: 0 | Status: DISCONTINUED | OUTPATIENT
Start: 2021-01-01 | End: 2021-01-01

## 2021-01-01 RX ORDER — FUROSEMIDE 40 MG
40 TABLET ORAL ONCE
Refills: 0 | Status: DISCONTINUED | OUTPATIENT
Start: 2021-01-01 | End: 2021-01-01

## 2021-01-01 RX ORDER — METOPROLOL TARTRATE 50 MG
25 TABLET ORAL
Refills: 0 | Status: DISCONTINUED | OUTPATIENT
Start: 2021-01-01 | End: 2021-01-01

## 2021-01-01 RX ORDER — PROPOFOL 10 MG/ML
40.1 INJECTION, EMULSION INTRAVENOUS
Qty: 1000 | Refills: 0 | Status: DISCONTINUED | OUTPATIENT
Start: 2021-01-01 | End: 2021-01-01

## 2021-01-01 RX ORDER — VANCOMYCIN HCL 1 G
1000 VIAL (EA) INTRAVENOUS EVERY 12 HOURS
Refills: 0 | Status: DISCONTINUED | OUTPATIENT
Start: 2021-01-01 | End: 2021-01-01

## 2021-01-01 RX ORDER — DEXTROSE 50 % IN WATER 50 %
15 SYRINGE (ML) INTRAVENOUS ONCE
Refills: 0 | Status: DISCONTINUED | OUTPATIENT
Start: 2021-01-01 | End: 2021-01-01

## 2021-01-01 RX ORDER — IPRATROPIUM/ALBUTEROL SULFATE 18-103MCG
3 AEROSOL WITH ADAPTER (GRAM) INHALATION EVERY 6 HOURS
Refills: 0 | Status: DISCONTINUED | OUTPATIENT
Start: 2021-01-01 | End: 2021-01-01

## 2021-01-01 RX ORDER — NITROGLYCERIN 6.5 MG
0.5 CAPSULE, EXTENDED RELEASE ORAL ONCE
Refills: 0 | Status: COMPLETED | OUTPATIENT
Start: 2021-01-01 | End: 2021-01-01

## 2021-01-01 RX ORDER — TRAMADOL HYDROCHLORIDE 50 MG/1
25 TABLET ORAL EVERY 6 HOURS
Refills: 0 | Status: DISCONTINUED | OUTPATIENT
Start: 2021-01-01 | End: 2021-01-01

## 2021-01-01 RX ORDER — ACETAMINOPHEN 500 MG
650 TABLET ORAL ONCE
Refills: 0 | Status: COMPLETED | OUTPATIENT
Start: 2021-01-01 | End: 2021-01-01

## 2021-01-01 RX ORDER — ATORVASTATIN CALCIUM 80 MG/1
80 TABLET, FILM COATED ORAL AT BEDTIME
Refills: 0 | Status: DISCONTINUED | OUTPATIENT
Start: 2021-01-01 | End: 2021-01-01

## 2021-01-01 RX ORDER — LIDOCAINE HCL 20 MG/ML
20 VIAL (ML) INJECTION ONCE
Refills: 0 | Status: DISCONTINUED | OUTPATIENT
Start: 2021-01-01 | End: 2021-01-01

## 2021-01-01 RX ORDER — DEXTROSE 50 % IN WATER 50 %
25 SYRINGE (ML) INTRAVENOUS ONCE
Refills: 0 | Status: DISCONTINUED | OUTPATIENT
Start: 2021-01-01 | End: 2021-01-01

## 2021-01-01 RX ORDER — INSULIN GLARGINE 100 [IU]/ML
14 INJECTION, SOLUTION SUBCUTANEOUS AT BEDTIME
Refills: 0 | Status: DISCONTINUED | OUTPATIENT
Start: 2021-01-01 | End: 2021-01-01

## 2021-01-01 RX ORDER — FAMOTIDINE 10 MG/ML
40 INJECTION INTRAVENOUS DAILY
Refills: 0 | Status: DISCONTINUED | OUTPATIENT
Start: 2021-01-01 | End: 2021-01-01

## 2021-01-01 RX ORDER — INSULIN LISPRO 100/ML
VIAL (ML) SUBCUTANEOUS AT BEDTIME
Refills: 0 | Status: DISCONTINUED | OUTPATIENT
Start: 2021-01-01 | End: 2021-01-01

## 2021-01-01 RX ORDER — METOPROLOL TARTRATE 50 MG
5 TABLET ORAL EVERY 6 HOURS
Refills: 0 | Status: DISCONTINUED | OUTPATIENT
Start: 2021-01-01 | End: 2021-01-01

## 2021-01-01 RX ORDER — FUROSEMIDE 40 MG
40 TABLET ORAL ONCE
Refills: 0 | Status: COMPLETED | OUTPATIENT
Start: 2021-01-01 | End: 2021-01-01

## 2021-01-01 RX ORDER — INSULIN LISPRO 100/ML
VIAL (ML) SUBCUTANEOUS
Refills: 0 | Status: DISCONTINUED | OUTPATIENT
Start: 2021-01-01 | End: 2021-01-01

## 2021-01-01 RX ORDER — MAGNESIUM SULFATE 500 MG/ML
2 VIAL (ML) INJECTION ONCE
Refills: 0 | Status: COMPLETED | OUTPATIENT
Start: 2021-01-01 | End: 2021-01-01

## 2021-01-01 RX ORDER — VANCOMYCIN HCL 1 G
1000 VIAL (EA) INTRAVENOUS ONCE
Refills: 0 | Status: COMPLETED | OUTPATIENT
Start: 2021-01-01 | End: 2021-01-01

## 2021-01-01 RX ORDER — SODIUM CHLORIDE 9 MG/ML
1000 INJECTION INTRAMUSCULAR; INTRAVENOUS; SUBCUTANEOUS ONCE
Refills: 0 | Status: COMPLETED | OUTPATIENT
Start: 2021-01-01 | End: 2021-01-01

## 2021-01-01 RX ORDER — DEXTROSE 50 % IN WATER 50 %
25 SYRINGE (ML) INTRAVENOUS ONCE
Refills: 0 | Status: COMPLETED | OUTPATIENT
Start: 2021-01-01 | End: 2021-01-01

## 2021-01-01 RX ORDER — POLYETHYLENE GLYCOL 3350 17 G/17G
17 POWDER, FOR SOLUTION ORAL DAILY
Refills: 0 | Status: DISCONTINUED | OUTPATIENT
Start: 2021-01-01 | End: 2021-01-01

## 2021-01-01 RX ORDER — SIMETHICONE 80 MG/1
80 TABLET, CHEWABLE ORAL THREE TIMES A DAY
Refills: 0 | Status: DISCONTINUED | OUTPATIENT
Start: 2021-01-01 | End: 2021-01-01

## 2021-01-01 RX ORDER — METOCLOPRAMIDE HCL 10 MG
5 TABLET ORAL ONCE
Refills: 0 | Status: COMPLETED | OUTPATIENT
Start: 2021-01-01 | End: 2021-01-01

## 2021-01-01 RX ORDER — PIPERACILLIN AND TAZOBACTAM 4; .5 G/20ML; G/20ML
3.38 INJECTION, POWDER, LYOPHILIZED, FOR SOLUTION INTRAVENOUS ONCE
Refills: 0 | Status: COMPLETED | OUTPATIENT
Start: 2021-01-01 | End: 2021-01-01

## 2021-01-01 RX ORDER — SODIUM BICARBONATE 1 MEQ/ML
50 SYRINGE (ML) INTRAVENOUS ONCE
Refills: 0 | Status: COMPLETED | OUTPATIENT
Start: 2021-01-01 | End: 2021-01-01

## 2021-01-01 RX ORDER — INSULIN GLARGINE 100 [IU]/ML
17 INJECTION, SOLUTION SUBCUTANEOUS AT BEDTIME
Refills: 0 | Status: DISCONTINUED | OUTPATIENT
Start: 2021-01-01 | End: 2021-01-01

## 2021-01-01 RX ORDER — PANTOPRAZOLE SODIUM 20 MG/1
40 TABLET, DELAYED RELEASE ORAL
Refills: 0 | Status: DISCONTINUED | OUTPATIENT
Start: 2021-01-01 | End: 2021-01-01

## 2021-01-01 RX ORDER — ACETAMINOPHEN 500 MG
1000 TABLET ORAL ONCE
Refills: 0 | Status: COMPLETED | OUTPATIENT
Start: 2021-01-01 | End: 2021-01-01

## 2021-01-01 RX ORDER — INSULIN LISPRO 100/ML
9 VIAL (ML) SUBCUTANEOUS
Refills: 0 | Status: DISCONTINUED | OUTPATIENT
Start: 2021-01-01 | End: 2021-01-01

## 2021-01-01 RX ORDER — GLUCAGON INJECTION, SOLUTION 0.5 MG/.1ML
1 INJECTION, SOLUTION SUBCUTANEOUS ONCE
Refills: 0 | Status: DISCONTINUED | OUTPATIENT
Start: 2021-01-01 | End: 2021-01-01

## 2021-01-01 RX ORDER — ALLOPURINOL 300 MG
300 TABLET ORAL EVERY 12 HOURS
Refills: 0 | Status: DISCONTINUED | OUTPATIENT
Start: 2021-01-01 | End: 2021-01-01

## 2021-01-01 RX ORDER — SENNA PLUS 8.6 MG/1
2 TABLET ORAL AT BEDTIME
Refills: 0 | Status: DISCONTINUED | OUTPATIENT
Start: 2021-01-01 | End: 2021-01-01

## 2021-01-01 RX ORDER — CHLORHEXIDINE GLUCONATE 213 G/1000ML
1 SOLUTION TOPICAL
Refills: 0 | Status: DISCONTINUED | OUTPATIENT
Start: 2021-01-01 | End: 2021-01-01

## 2021-01-01 RX ORDER — NOREPINEPHRINE BITARTRATE/D5W 8 MG/250ML
0.3 PLASTIC BAG, INJECTION (ML) INTRAVENOUS
Qty: 8 | Refills: 0 | Status: DISCONTINUED | OUTPATIENT
Start: 2021-01-01 | End: 2021-01-01

## 2021-01-01 RX ORDER — PIPERACILLIN AND TAZOBACTAM 4; .5 G/20ML; G/20ML
3.38 INJECTION, POWDER, LYOPHILIZED, FOR SOLUTION INTRAVENOUS EVERY 8 HOURS
Refills: 0 | Status: DISCONTINUED | OUTPATIENT
Start: 2021-01-01 | End: 2021-01-01

## 2021-01-01 RX ORDER — FUROSEMIDE 40 MG
40 TABLET ORAL DAILY
Refills: 0 | Status: DISCONTINUED | OUTPATIENT
Start: 2021-01-01 | End: 2021-01-01

## 2021-01-01 RX ORDER — HEPARIN SODIUM 5000 [USP'U]/ML
5000 INJECTION INTRAVENOUS; SUBCUTANEOUS EVERY 8 HOURS
Refills: 0 | Status: DISCONTINUED | OUTPATIENT
Start: 2021-01-01 | End: 2021-01-01

## 2021-01-01 RX ORDER — LANOLIN ALCOHOL/MO/W.PET/CERES
3 CREAM (GRAM) TOPICAL AT BEDTIME
Refills: 0 | Status: DISCONTINUED | OUTPATIENT
Start: 2021-01-01 | End: 2021-01-01

## 2021-01-01 RX ORDER — INSULIN LISPRO 100/ML
4 VIAL (ML) SUBCUTANEOUS
Refills: 0 | Status: DISCONTINUED | OUTPATIENT
Start: 2021-01-01 | End: 2021-01-01

## 2021-01-01 RX ADMIN — Medication 3 MILLILITER(S): at 00:11

## 2021-01-01 RX ADMIN — Medication 300 MILLIGRAM(S): at 05:51

## 2021-01-01 RX ADMIN — SODIUM CHLORIDE 1000 MILLILITER(S): 9 INJECTION INTRAMUSCULAR; INTRAVENOUS; SUBCUTANEOUS at 18:39

## 2021-01-01 RX ADMIN — PANTOPRAZOLE SODIUM 40 MILLIGRAM(S): 20 TABLET, DELAYED RELEASE ORAL at 12:52

## 2021-01-01 RX ADMIN — Medication 1 DROP(S): at 15:07

## 2021-01-01 RX ADMIN — PIPERACILLIN AND TAZOBACTAM 25 GRAM(S): 4; .5 INJECTION, POWDER, LYOPHILIZED, FOR SOLUTION INTRAVENOUS at 22:02

## 2021-01-01 RX ADMIN — Medication 2: at 18:02

## 2021-01-01 RX ADMIN — Medication 1000 MILLIGRAM(S): at 15:07

## 2021-01-01 RX ADMIN — PIPERACILLIN AND TAZOBACTAM 25 GRAM(S): 4; .5 INJECTION, POWDER, LYOPHILIZED, FOR SOLUTION INTRAVENOUS at 13:22

## 2021-01-01 RX ADMIN — Medication 650 MILLIGRAM(S): at 05:41

## 2021-01-01 RX ADMIN — Medication 650 MILLIGRAM(S): at 17:45

## 2021-01-01 RX ADMIN — Medication 25 MILLIGRAM(S): at 05:59

## 2021-01-01 RX ADMIN — SIMETHICONE 80 MILLIGRAM(S): 80 TABLET, CHEWABLE ORAL at 13:59

## 2021-01-01 RX ADMIN — Medication 3 MILLILITER(S): at 10:00

## 2021-01-01 RX ADMIN — Medication 400 MILLIGRAM(S): at 15:29

## 2021-01-01 RX ADMIN — Medication 250 MILLIGRAM(S): at 21:03

## 2021-01-01 RX ADMIN — Medication 650 MILLIGRAM(S): at 03:40

## 2021-01-01 RX ADMIN — PIPERACILLIN AND TAZOBACTAM 3.38 GRAM(S): 4; .5 INJECTION, POWDER, LYOPHILIZED, FOR SOLUTION INTRAVENOUS at 20:59

## 2021-01-01 RX ADMIN — Medication 25 MILLIGRAM(S): at 05:51

## 2021-01-01 RX ADMIN — ATORVASTATIN CALCIUM 80 MILLIGRAM(S): 80 TABLET, FILM COATED ORAL at 21:44

## 2021-01-01 RX ADMIN — INSULIN GLARGINE 14 UNIT(S): 100 INJECTION, SOLUTION SUBCUTANEOUS at 23:49

## 2021-01-01 RX ADMIN — Medication 650 MILLIGRAM(S): at 16:40

## 2021-01-01 RX ADMIN — PIPERACILLIN AND TAZOBACTAM 25 GRAM(S): 4; .5 INJECTION, POWDER, LYOPHILIZED, FOR SOLUTION INTRAVENOUS at 05:58

## 2021-01-01 RX ADMIN — Medication 4 UNIT(S): at 13:21

## 2021-01-01 RX ADMIN — Medication 5 MILLIGRAM(S): at 13:55

## 2021-01-01 RX ADMIN — POLYETHYLENE GLYCOL 3350 17 GRAM(S): 17 POWDER, FOR SOLUTION ORAL at 17:29

## 2021-01-01 RX ADMIN — PIPERACILLIN AND TAZOBACTAM 200 GRAM(S): 4; .5 INJECTION, POWDER, LYOPHILIZED, FOR SOLUTION INTRAVENOUS at 20:28

## 2021-01-01 RX ADMIN — Medication 400 MILLIGRAM(S): at 12:26

## 2021-01-01 RX ADMIN — Medication 4 UNIT(S): at 18:02

## 2021-01-01 RX ADMIN — Medication 6: at 08:16

## 2021-01-01 RX ADMIN — Medication 10 MILLIGRAM(S): at 20:46

## 2021-01-01 RX ADMIN — Medication 3 MILLIGRAM(S): at 21:45

## 2021-01-01 RX ADMIN — Medication 6: at 13:54

## 2021-01-01 RX ADMIN — Medication 50 MILLIEQUIVALENT(S): at 18:17

## 2021-01-01 RX ADMIN — PANTOPRAZOLE SODIUM 40 MILLIGRAM(S): 20 TABLET, DELAYED RELEASE ORAL at 22:38

## 2021-01-01 RX ADMIN — PIPERACILLIN AND TAZOBACTAM 25 GRAM(S): 4; .5 INJECTION, POWDER, LYOPHILIZED, FOR SOLUTION INTRAVENOUS at 20:46

## 2021-01-01 RX ADMIN — Medication 25 MILLIGRAM(S): at 03:39

## 2021-01-01 RX ADMIN — Medication 650 MILLIGRAM(S): at 03:56

## 2021-01-01 RX ADMIN — Medication 0.5 INCH(S): at 16:55

## 2021-01-01 RX ADMIN — ATORVASTATIN CALCIUM 80 MILLIGRAM(S): 80 TABLET, FILM COATED ORAL at 22:03

## 2021-01-01 RX ADMIN — PANTOPRAZOLE SODIUM 40 MILLIGRAM(S): 20 TABLET, DELAYED RELEASE ORAL at 22:02

## 2021-01-01 RX ADMIN — SENNA PLUS 2 TABLET(S): 8.6 TABLET ORAL at 21:44

## 2021-01-01 RX ADMIN — PIPERACILLIN AND TAZOBACTAM 25 GRAM(S): 4; .5 INJECTION, POWDER, LYOPHILIZED, FOR SOLUTION INTRAVENOUS at 13:59

## 2021-01-01 RX ADMIN — SODIUM CHLORIDE 1000 MILLILITER(S): 9 INJECTION INTRAMUSCULAR; INTRAVENOUS; SUBCUTANEOUS at 17:39

## 2021-01-01 RX ADMIN — Medication 2 GRAM(S): at 20:28

## 2021-01-01 RX ADMIN — Medication 3 MILLILITER(S): at 17:29

## 2021-01-01 RX ADMIN — TRAMADOL HYDROCHLORIDE 25 MILLIGRAM(S): 50 TABLET ORAL at 13:59

## 2021-01-01 RX ADMIN — Medication 6: at 09:15

## 2021-01-01 RX ADMIN — Medication 0.5 INCH(S): at 19:02

## 2021-01-01 RX ADMIN — Medication 300 MILLIGRAM(S): at 06:15

## 2021-01-01 RX ADMIN — Medication 25 MILLILITER(S): at 06:05

## 2021-01-01 RX ADMIN — Medication 3 MILLILITER(S): at 03:40

## 2021-01-01 RX ADMIN — Medication 3 MILLILITER(S): at 09:29

## 2021-01-01 RX ADMIN — Medication 1 DROP(S): at 21:44

## 2021-01-01 RX ADMIN — Medication 250 MILLIGRAM(S): at 22:02

## 2021-01-01 RX ADMIN — Medication 300 MILLIGRAM(S): at 17:28

## 2021-01-01 RX ADMIN — PANTOPRAZOLE SODIUM 40 MILLIGRAM(S): 20 TABLET, DELAYED RELEASE ORAL at 09:15

## 2021-01-01 RX ADMIN — PIPERACILLIN AND TAZOBACTAM 25 GRAM(S): 4; .5 INJECTION, POWDER, LYOPHILIZED, FOR SOLUTION INTRAVENOUS at 05:50

## 2021-01-01 RX ADMIN — INSULIN GLARGINE 14 UNIT(S): 100 INJECTION, SOLUTION SUBCUTANEOUS at 22:37

## 2021-01-01 RX ADMIN — Medication 650 MILLIGRAM(S): at 04:15

## 2021-01-01 RX ADMIN — Medication 40 MILLIGRAM(S): at 13:57

## 2021-01-01 RX ADMIN — PIPERACILLIN AND TAZOBACTAM 25 GRAM(S): 4; .5 INJECTION, POWDER, LYOPHILIZED, FOR SOLUTION INTRAVENOUS at 06:15

## 2021-01-01 RX ADMIN — Medication 1 DROP(S): at 09:16

## 2021-01-01 RX ADMIN — Medication 50 GRAM(S): at 19:35

## 2021-01-01 RX ADMIN — Medication 8: at 13:21

## 2021-01-01 RX ADMIN — Medication 25 MILLIGRAM(S): at 17:28

## 2021-01-01 RX ADMIN — CHLORHEXIDINE GLUCONATE 15 MILLILITER(S): 213 SOLUTION TOPICAL at 05:51

## 2021-06-05 NOTE — ED PROVIDER NOTE - CRITICAL CARE ATTENDING CONTRIBUTION TO CARE
Pt seen and examined and d/w PA.  agree with a and p.  pt is a 86 yo female hx dm, cad, p/w cc of weakness, cp, n/v, abd pain, dark stools and sob. pt just d/c from [facility] yesterday with low platelets and told to fu with heme prior to eval for cabg, pt today with worsening symptoms, pt on exam tachycardic,  mild sob, lungs ccta, conj pink, ekg with ischemic changes, fever, check labs, pt with lactate over 4, + trops,  icu consulted.  pt seen in ed by dr hernandez of cards, pt with severe as, cad in need of cabg but currently too ill, need optimization, will transfer to [place] for cards eval, medical optimization and ultimately surgery.

## 2021-06-05 NOTE — ED ADULT NURSE REASSESSMENT NOTE - NS ED NURSE REASSESS COMMENT FT1
Ambulance arrives for transport to [place]. Report given to EMS, all belongings home with daughter. All paperwork with EMS. Pt transferred to EMS stretcher without incident.

## 2021-06-05 NOTE — ED PROVIDER NOTE - OBJECTIVE STATEMENT
Pt is a 84 yo female with pmhx of DM HTN HLD AS CAD on asa and plavix CHF BIBEMS for worsening chest pain sob back pain nausea vomiting abdominal pain and generalized weakness. Pt had cardiac workup for clearance for cardiac cath at [facility] Dr. Reese this week. Pt was taken to [place] yesterday and had low platelet. Pt daughter states she had dark stools yesterday. Pt noted to be febrile in er. Pt vaccinated for covid. limited history from pt states she is cold and denies any other symptoms even though looks short of breath.     pcp Suzanna devries Pt is a 86 yo female with pmhx of DM HTN HLD AS CAD on asa and plavix CHF BIBEMS for worsening chest pain sob back pain nausea vomiting abdominal pain and generalized weakness for past 5 days. Pt had cardiac workup for clearance for cabg at [facility] Dr. Reese this week. Pt was taken to [place] yesterday and had low platelet. Pt daughter states she had dark stools yesterday. Pt noted to be febrile in er. Pt vaccinated for covid. limited history from pt states she is cold and denies any other symptoms even though looks short of breath.     pcp Suzanna devries

## 2021-06-05 NOTE — ED PROVIDER NOTE - PROGRESS NOTE DETAILS
jil/ dr santiago hospitalist at [place] , will admit pt for tele and for our cards to follow pt at [place] and further w/u and optimization for eventual cabg and valve replacement,   awaitiing available bed. will get back to me within  1-2 hours for bed availablity.

## 2021-06-05 NOTE — ED ADULT NURSE NOTE - OBJECTIVE STATEMENT
Presents to ER with CP/SOB/back pain.  Was seen at [place] yesterday and was told that pt's platelets were low and needs to f/u with hematology.  Family stating pt is due for cardiac cath as well.  Rectal temp 101, sepsis/cardiac workup completed. Presents to ER with CP/SOB/back pain.  Was seen at [place] yesterday and was told that pt's platelets were low and needs to f/u with hematology.  Family stating pt is due for cardiac cath as well.  Rectal temp 101, sepsis/cardiac workup completed.  Received last Moderna shot 3/2/21.

## 2021-06-05 NOTE — CONSULT NOTE ADULT - SUBJECTIVE AND OBJECTIVE BOX
[facility]         Som Motta, Deepti, Trinidad, Jose M, Madonna Mar        [phone] (office)    CHIEF COMPLAINT: Patient is a 85y old  Female who presents with a chief complaint of sob and cp    HPI:  Hx from daughter at bedside at pt request.  Daughter is an endocrinologist.    85f with a limited hx until recently.  She has htn, oa, more recent dm.  She has had leukocytosis over the past year, which has been unexplained.  She had a compression fx in ~2019 in [place], and there was suspicion for mets at that time and an extensive eval was done then, apparently inconclusive. She had cardiac cath in [place] for unclear reasons in 2019, and apparently was discovered to have an occluded and collateralized rca, with severe disease of the lad and om.  She was medically managed at that time, for unclear reasons-the pt is not aware of having had the test, though the daughter states she is not demented.    More recently she has had more medical care because of htn and slurred speech.  A series of evaluations was done, including a tte which revealed severe low gradient AS (gradients not available).  She was being considered for cabg/avr and pci/tavr, and more recently the latter was being pursued.  Over the past 2 months she has had progressive mcdermott and over the past 2 weeks she has had diffuse body aches as well as some more localized chest discomfort, radiating to the back at times.  She was noted to have progressive thrombocytopenia, with her platelet count dropping from ~100 to ~25, and with progressive anemia as well.  ntg sl was prescribed 2d ago, taken once.  She has been taking asa and plavix.      She was brought to [place] yesterday by family for the platelet count, but were sent home given that she was not bleeding. Brought here today by her daughter.    In the er, she was febrile to 101, tachycardic.     PAST MEDICAL & SURGICAL HISTORY:  Diabetes mellitus        SOCIAL HISTORY: No active tobacco, alcohol or illicit drug use    FAMILY HISTORY:   No pertinent family history of CAD    Outpatient medications:  metop 25 bid  lasix 20 bid  atorva 40  plavix  asa  ntg sl  kcl 10    MEDICATIONS  (STANDING):  piperacillin/tazobactam IVPB... 3.375 Gram(s) IV Intermittent once    MEDICATIONS  (PRN):      Allergies    No Known Allergies    Intolerances        REVIEW OF SYSTEMS: Is negative for eye, ENT, GI, , allergic, dermatologic, musculoskeletal and neurologic, except as described above.    VITAL SIGNS:   Vital Signs Last 24 Hrs  T(C): 37.1 (05 Jun 2021 19:38), Max: 38.3 (05 Jun 2021 16:13)  T(F): 98.7 (05 Jun 2021 19:38), Max: 101 (05 Jun 2021 16:13)  HR: 106 (05 Jun 2021 19:38) (74 - 110)  BP: 114/59 (05 Jun 2021 19:38) (114/59 - 137/60)  BP(mean): 82 (05 Jun 2021 19:38) (82 - 82)  RR: 24 (05 Jun 2021 19:38) (16 - 32)  SpO2: 100% (05 Jun 2021 19:38) (96% - 100%)    I&O's Summary      PHYSICAL EXAM:    Constitutional: NAD, awake and alert, well-developed  Eyes:  EOMI, no oral cyanosis, conjunctivae clear, anicteric.  Pulmonary: Non-labored, breath sounds are clear bilaterally, no wheezing, rales or rhonchi  Cardiovascular:  regular S1 and S2. distant with 2/6 high pitched sys murmur.  No rubs, gallops or clicks  Gastrointestinal: Bowel Sounds present, soft, nontender.   Lymph: No peripheral edema.   Neurological: Alert, strength and sensitivity are grossly intact  Skin: No obvious lesions/rashes.   Psych:  Mood & affect appropriate .    LABS: All Labs Reviewed:                        9.1    18.90 )-----------( 24       ( 05 Jun 2021 17:53 )             26.9     05 Jun 2021 16:47    133    |  100    |  16     ----------------------------<  259    4.1     |  21     |  0.91     Ca    8.5        05 Jun 2021 16:47  Phos  2.9       05 Jun 2021 16:47  Mg     1.8       05 Jun 2021 16:47    TPro  9.3    /  Alb  3.1    /  TBili  0.9    /  DBili  x      /  AST  42     /  ALT  27     /  AlkPhos  127    05 Jun 2021 16:47    PT/INR - ( 05 Jun 2021 16:47 )   PT: 13.8 sec;   INR: 1.19 ratio         PTT - ( 05 Jun 2021 16:47 )  PTT:26.9 sec  CARDIAC MARKERS ( 05 Jun 2021 16:47 )  .494 ng/mL / x     / x     / x     / 4.4 ng/mL      Blood Culture:   06-05 @ 16:47  Pro Bnp 1790        RADIOLOGY:    EKG: sr ivcd

## 2021-06-05 NOTE — CONSULT NOTE ADULT - SUBJECTIVE AND OBJECTIVE BOX
Patient is a 85y old  Female who presents with a chief complaint of chest pain, shortness of breath    BRIEF HOSPITAL COURSE: 84 yo female, PMHx HFpEF, known obstructive CAD (s/p cardiac cath 2019 in [place] which revealed LAD 70-80%, OM1 80%,  RCA), severe AS, HTN, newly diagnosed T2DM HgbA1c >9. She was recommended for CABG in [place], however family refused at that time for an unknown reason. Patient came to [place] December of last year, and established care with Cardiologist Dr. Karen Tobar. She was undergoing workup for TAVR candidacy, however was recommended to have cardiac cath prior to procedure. She had persistent leukocytosis WBC 13-14 and Hgb ~10 at baseline in [place], however on recent workup has had worsening anemia and new thrombocytopenia, most recently yesterday with a platelet count of 25, precluding her from cardiac procedures. She had outpatient CTs as part of TAVR workup that revealed findings of bronchiectasis and cirrhosis.    Over the past 5 days, patient has had intermittent chest pain. Her chest pain is described as a "heaviness" that radiates to her jaw, left arm, and back, that spontaneously resolves. Her pain was previously so intense she was unable to move. She has also had progressively worsening shortness of breath when ambulating short distances, from the bed to bathroom, that improves with rest. Her daughter also notes symptoms of indigestion, poor PO intake, a nonproductive cough. She has had no fevers or urinary symptoms at home. Yesterday, she went to [facility] ED for evaluation of symptoms. She was told her troponin was normal, , her HGB was 8's, and platelets had dropped from ~75 to 25, and was discharged home. On review of lab results from [place], a troponin was not sent.    She now presents to [facility] ED for evaluation of acute worsening of her chest pain and shortness of breath today. Her troponin is 0.45, BNP 1790, lactate 4.7. WBC 18.9, plt 24. She spiked fever to 101'F, has been hemodynamically stable, with 's with frequent PVCs.         PAST MEDICAL & SURGICAL HISTORY:  Diabetes mellitus  HTN  HFpEF  CAD  Severe AS  Cirrhosis  Bronchiectasis        SOCIAL HISTORY: Lives at home with her son. Never smoker. No EtOH use.         Review of Systems: See HPI.        Medications:        ICU Vital Signs Last 24 Hrs  T(C): 37.4 (2021 17:41), Max: 38.3 (2021 16:13)  T(F): 99.4 (2021 17:41), Max: 101 (2021 16:13)  HR: 109 (2021 17:41) (74 - 110)  BP: 128/60 (2021 17:41) (118/80 - 137/60)  BP(mean): --  ABP: --  ABP(mean): --  RR: 25 (2021 17:41) (16 - 32)  SpO2: 100% (2021 17:41) (96% - 100%)          I&O's Detail        LABS:                        9.1    18.90 )-----------( 24       ( 2021 17:53 )             26.9     06-    133<L>  |  100  |  16  ----------------------------<  259<H>  4.1   |  21<L>  |  0.91    Ca    8.5      2021 16:47    TPro  9.3<H>  /  Alb  3.1<L>  /  TBili  0.9  /  DBili  x   /  AST  42<H>  /  ALT  27  /  AlkPhos  127<H>  06-05      CARDIAC MARKERS ( 2021 16:47 )  .494 ng/mL / x     / x     / x     / 4.4 ng/mL      CAPILLARY BLOOD GLUCOSE        PT/INR - ( 2021 16:47 )   PT: 13.8 sec;   INR: 1.19 ratio         PTT - ( 2021 16:47 )  PTT:26.9 sec  Urinalysis Basic - ( 2021 18:12 )    Color: Pale Yellow / Appearance: Clear / S.015 / pH: x  Gluc: x / Ketone: Small  / Bili: Negative / Urobili: Negative   Blood: x / Protein: 30 mg/dL / Nitrite: Negative   Leuk Esterase: Trace / RBC: x / WBC x   Sq Epi: x / Non Sq Epi: x / Bacteria: x      CULTURES:  Rapid RVP Result: NotDetec ( @ 16:42)            Physical Examination:    General: Toxic appearing. No acute distress.      HEENT: Pupils equal, reactive to light. Symmetric. Periorbital edema.    PULM: Clear to auscultation bilaterally, no wheezing.    CVS: Sinus tachycardia, +systolic murmur    ABD: Softly distended, mildly diffusely tender, normoactive bowel sounds, no masses    EXT: No edema, nontender. No calf tenderness.    SKIN: Warm and well perfused.    NEURO: Alert, oriented, interactive, nonfocal.        RADIOLOGY:       CODE STATUS: FULL        CRITICAL CARE TIME SPENT: 55 minutes spent performing frequent bedside reassessments and augmenting plan of care to address problems of acute critical illness that pose high probability of life threatening deterioration and/or end organ damage/dysfunction and discussing goals of care, non-inclusive of time spent on procedures performed. Patient is a 85y old  Female who presents with a chief complaint of chest pain, shortness of breath    BRIEF HOSPITAL COURSE: 84 yo female, PMHx HFpEF, known obstructive CAD (s/p cardiac cath 2019 in [place] which revealed LAD 70-80%, OM1 80%,  RCA), severe AS, HTN, newly diagnosed T2DM HgbA1c >9. She was recommended for CABG in [place], however family refused at that time for an unknown reason. [place] came to [place] December of last year, and established care with Cardiologist Dr. Karen Tobar. She was undergoing workup for TAVR candidacy, however was recommended to have cardiac cath prior to procedure. She had persistent leukocytosis WBC 13-14 and Hgb ~10 at baseline in [place], however on recent workup has had worsening anemia, new thrombocytopenia, and blasts. She had outpatient CTs as part of TAVR workup that revealed findings of bronchiectasis and cirrhosis.    Over the past 5 days, patient has had intermittent chest pain. Her chest pain is described as a "heaviness" that radiates to her jaw, left arm, and back, that spontaneously resolves. Her pain was previously so intense she was unable to move. She has also had progressively worsening shortness of breath when ambulating short distances, from the bed to bathroom, that improves with rest. Her daughter also notes symptoms of indigestion, poor PO intake, a nonproductive cough. She has had no fevers or urinary symptoms at home. Yesterday, she went to [facility] ED for evaluation of symptoms. She was told her troponin was normal, , her HGB was 8's, and platelets had dropped from ~75 to 25, and was discharged home. On review of lab results from [place], a troponin was not sent.    She now presents to [facility] ED for evaluation of acute worsening of her chest pain and shortness of breath today. Her troponin is 0.45, BNP 1790, lactate 4.7. WBC 18.9, plt 24. She spiked fever to 101'F, has been hemodynamically stable, with 's with frequent PVCs.         PAST MEDICAL & SURGICAL HISTORY:  Diabetes mellitus  HTN  HFpEF  CAD  Severe AS  Cirrhosis  Bronchiectasis        SOCIAL HISTORY: Lives at home with her son. Never smoker. No EtOH use.         Review of Systems: See HPI.        Medications:        ICU Vital Signs Last 24 Hrs  T(C): 37.4 (2021 17:41), Max: 38.3 (2021 16:13)  T(F): 99.4 (2021 17:41), Max: 101 (2021 16:13)  HR: 109 (2021 17:41) (74 - 110)  BP: 128/60 (2021 17:41) (118/80 - 137/60)  BP(mean): --  ABP: --  ABP(mean): --  RR: 25 (2021 17:41) (16 - 32)  SpO2: 100% (2021 17:41) (96% - 100%)          I&O's Detail        LABS:                        9.1    18.90 )-----------( 24       ( 2021 17:53 )             26.9     06-    133<L>  |  100  |  16  ----------------------------<  259<H>  4.1   |  21<L>  |  0.91    Ca    8.5      2021 16:47    TPro  9.3<H>  /  Alb  3.1<L>  /  TBili  0.9  /  DBili  x   /  AST  42<H>  /  ALT  27  /  AlkPhos  127<H>  06-05      CARDIAC MARKERS ( 2021 16:47 )  .494 ng/mL / x     / x     / x     / 4.4 ng/mL      CAPILLARY BLOOD GLUCOSE        PT/INR - ( 2021 16:47 )   PT: 13.8 sec;   INR: 1.19 ratio         PTT - ( 2021 16:47 )  PTT:26.9 sec  Urinalysis Basic - ( 2021 18:12 )    Color: Pale Yellow / Appearance: Clear / S.015 / pH: x  Gluc: x / Ketone: Small  / Bili: Negative / Urobili: Negative   Blood: x / Protein: 30 mg/dL / Nitrite: Negative   Leuk Esterase: Trace / RBC: x / WBC x   Sq Epi: x / Non Sq Epi: x / Bacteria: x      CULTURES:  Rapid RVP Result: NotDetec ( @ 16:42)            Physical Examination:    General: Toxic appearing. No acute distress.      HEENT: Pupils equal, reactive to light. Symmetric. Periorbital edema.    PULM: Clear to auscultation bilaterally, no wheezing.    CVS: Sinus tachycardia, +systolic murmur    ABD: Softly distended, mildly diffusely tender, normoactive bowel sounds, no masses    EXT: No edema, nontender. No calf tenderness.    SKIN: Warm and well perfused.    NEURO: Alert, oriented, interactive, nonfocal.        RADIOLOGY:       CODE STATUS: FULL        CRITICAL CARE TIME SPENT: 55 minutes spent performing frequent bedside reassessments and augmenting plan of care to address problems of acute critical illness that pose high probability of life threatening deterioration and/or end organ damage/dysfunction and discussing goals of care, non-inclusive of time spent on procedures performed. Patient is a 85y old  Female who presents with a chief complaint of chest pain, shortness of breath    BRIEF HOSPITAL COURSE: 84 yo female, PMHx HFpEF, known obstructive CAD (s/p cardiac cath 2019 in [place] which revealed LAD 70-80%, OM1 80%,  RCA), severe AS, HTN, newly diagnosed T2DM HgbA1c >9. She was recommended for CABG in [place], however family refused at that time for an unknown reason. [place] came to [place] December of last year, and established care with Cardiologist Dr. Karen Tobar. She was undergoing workup for TAVR candidacy, however was recommended to have cardiac cath prior to procedure. She had persistent leukocytosis WBC 13-14 and Hgb ~10 at baseline in [place], however on recent workup has had worsening anemia, new thrombocytopenia, and blasts. She had outpatient CTs as part of TAVR workup that revealed findings of bronchiectasis and cirrhosis.    Over the past 5 days, patient has had intermittent chest pain. Her chest pain is described as a "heaviness" that radiates to her jaw, left arm, and back, that spontaneously resolves. Her pain was previously so intense she was unable to move. She has also had progressively worsening shortness of breath when ambulating short distances, from the bed to bathroom, that improves with rest. Her daughter also notes symptoms of indigestion, poor PO intake, a nonproductive cough. She has had no fevers or urinary symptoms at home. Yesterday, she went to [facility] ED for evaluation of symptoms. She was told her troponin was normal, , her HGB was 8's, and platelets had dropped from ~75 to 25, and was discharged home. On review of lab results from [place], a troponin was not sent.    She now presents to [facility] ED for evaluation of acute worsening of her chest pain and shortness of breath today. Her troponin is 0.45, BNP 1790, lactate 4.7. WBC 18.9, plt 24. She spiked fever to 101'F, has been hemodynamically stable, with 's with frequent PVCs. POCUS revealed A-line predominance, with poor cardiac windows but concerns for reduced EF and apical hypokinesis.        PAST MEDICAL & SURGICAL HISTORY:  Diabetes mellitus  HTN  HFpEF  CAD  Severe AS  Cirrhosis  Bronchiectasis        SOCIAL HISTORY: Lives at home with her son. Never smoker. No EtOH use.         Review of Systems: See HPI.        Medications:        ICU Vital Signs Last 24 Hrs  T(C): 37.4 (2021 17:41), Max: 38.3 (2021 16:13)  T(F): 99.4 (2021 17:41), Max: 101 (2021 16:13)  HR: 109 (2021 17:41) (74 - 110)  BP: 128/60 (2021 17:41) (118/80 - 137/60)  BP(mean): --  ABP: --  ABP(mean): --  RR: 25 (2021 17:41) (16 - 32)  SpO2: 100% (2021 17:41) (96% - 100%)          I&O's Detail        LABS:                        9.1    18.90 )-----------( 24       ( 2021 17:53 )             26.9     06-05    133<L>  |  100  |  16  ----------------------------<  259<H>  4.1   |  21<L>  |  0.91    Ca    8.5      2021 16:47    TPro  9.3<H>  /  Alb  3.1<L>  /  TBili  0.9  /  DBili  x   /  AST  42<H>  /  ALT  27  /  AlkPhos  127<H>  06-05      CARDIAC MARKERS ( 2021 16:47 )  .494 ng/mL / x     / x     / x     / 4.4 ng/mL      CAPILLARY BLOOD GLUCOSE        PT/INR - ( 2021 16:47 )   PT: 13.8 sec;   INR: 1.19 ratio         PTT - ( 2021 16:47 )  PTT:26.9 sec  Urinalysis Basic - ( 2021 18:12 )    Color: Pale Yellow / Appearance: Clear / S.015 / pH: x  Gluc: x / Ketone: Small  / Bili: Negative / Urobili: Negative   Blood: x / Protein: 30 mg/dL / Nitrite: Negative   Leuk Esterase: Trace / RBC: x / WBC x   Sq Epi: x / Non Sq Epi: x / Bacteria: x      CULTURES:  Rapid RVP Result: NotDetec ( @ 16:42)            Physical Examination:    General: Toxic appearing. No acute distress.      HEENT: Pupils equal, reactive to light. Symmetric. Periorbital edema.    PULM: Clear to auscultation bilaterally, no wheezing.    CVS: Sinus tachycardia, +systolic murmur    ABD: Softly distended, mildly diffusely tender, normoactive bowel sounds, no masses    EXT: No edema, nontender. No calf tenderness.    SKIN: Warm and well perfused.    NEURO: Alert, oriented, interactive, nonfocal.        RADIOLOGY:       CODE STATUS: FULL        CRITICAL CARE TIME SPENT: 55 minutes spent performing frequent bedside reassessments and augmenting plan of care to address problems of acute critical illness that pose high probability of life threatening deterioration and/or end organ damage/dysfunction and discussing goals of care, non-inclusive of time spent on procedures performed. Patient is a 85y old  Female who presents with a chief complaint of chest pain, shortness of breath    BRIEF HOSPITAL COURSE: 84 yo female, PMHx HFpEF, known obstructive CAD (s/p cardiac cath 2019 in [place] which revealed LAD 70-80%, OM1 80%,  RCA), severe AS, HTN, newly diagnosed T2DM HgbA1c >9. She was recommended for CABG in [place], however family refused at that time for an unknown reason. [place] came to [place] December of last year, and established care with Cardiologist Dr. Karen Tobar. She was undergoing workup for TAVR candidacy, however was recommended to have cardiac cath prior to procedure. She had persistent leukocytosis WBC 13-14 and Hgb ~10 at baseline in [place], however on recent workup has had worsening anemia, new thrombocytopenia, and blasts. She had outpatient CTs as part of TAVR workup that revealed findings of bronchiectasis and cirrhosis.    Over the past 5 days, patient has had intermittent chest pain. Her chest pain is described as a "heaviness" that radiates to her jaw, left arm, and back, that spontaneously resolves. Her pain was previously so intense she was unable to move. She has also had progressively worsening shortness of breath when ambulating short distances, from the bed to bathroom, that improves with rest. Her daughter also notes symptoms of indigestion, poor PO intake, a nonproductive cough. She has had no fevers or urinary symptoms at home. Yesterday, she went to [facility] ED for evaluation of symptoms. She was told her troponin was normal, , her HGB was 8's, and platelets had dropped from ~75 to 25, and was discharged home. On review of lab results from [place], a troponin was not sent.    She now presents to [facility] ED for evaluation of acute worsening of her chest pain and shortness of breath today. Her troponin is 0.45, BNP 1790, lactate 4.7. WBC 18.9, plt 24. She spiked fever to 101'F, has been hemodynamically stable, with 's with frequent PVCs. There was question of an episode of ventricular tachycardia, however telemetry review reveals only ST with frequent PVCs. Mg 1.8, supplemented. POCUS revealed A-line predominance, with poor cardiac windows but concerns for reduced EF and apical hypokinesis.        PAST MEDICAL & SURGICAL HISTORY:  Diabetes mellitus  HTN  HFpEF  CAD  Severe AS  Cirrhosis  Bronchiectasis        SOCIAL HISTORY: Lives at home with her son. Never smoker. No EtOH use.         Review of Systems: See HPI.        Medications:        ICU Vital Signs Last 24 Hrs  T(C): 37.4 (2021 17:41), Max: 38.3 (2021 16:13)  T(F): 99.4 (2021 17:41), Max: 101 (2021 16:13)  HR: 109 (2021 17:41) (74 - 110)  BP: 128/60 (2021 17:41) (118/80 - 137/60)  BP(mean): --  ABP: --  ABP(mean): --  RR: 25 (2021 17:41) (16 - 32)  SpO2: 100% (2021 17:41) (96% - 100%)          I&O's Detail        LABS:                        9.1    18.90 )-----------( 24       ( 2021 17:53 )             26.9     06-05    133<L>  |  100  |  16  ----------------------------<  259<H>  4.1   |  21<L>  |  0.91    Ca    8.5      2021 16:47    TPro  9.3<H>  /  Alb  3.1<L>  /  TBili  0.9  /  DBili  x   /  AST  42<H>  /  ALT  27  /  AlkPhos  127<H>  06-05      CARDIAC MARKERS ( 2021 16:47 )  .494 ng/mL / x     / x     / x     / 4.4 ng/mL      CAPILLARY BLOOD GLUCOSE        PT/INR - ( 2021 16:47 )   PT: 13.8 sec;   INR: 1.19 ratio         PTT - ( 2021 16:47 )  PTT:26.9 sec  Urinalysis Basic - ( 2021 18:12 )    Color: Pale Yellow / Appearance: Clear / S.015 / pH: x  Gluc: x / Ketone: Small  / Bili: Negative / Urobili: Negative   Blood: x / Protein: 30 mg/dL / Nitrite: Negative   Leuk Esterase: Trace / RBC: x / WBC x   Sq Epi: x / Non Sq Epi: x / Bacteria: x      CULTURES:  Rapid RVP Result: NotDetec ( @ 16:42)            Physical Examination:    General: Toxic appearing. No acute distress.      HEENT: Pupils equal, reactive to light. Symmetric. Periorbital edema.    PULM: Clear to auscultation bilaterally, no wheezing.    CVS: Sinus tachycardia, +systolic murmur    ABD: Softly distended, mildly diffusely tender, normoactive bowel sounds, no masses    EXT: No edema, nontender. No calf tenderness.    SKIN: Warm and well perfused.    NEURO: Alert, oriented, interactive, nonfocal.        RADIOLOGY:       CODE STATUS: FULL        CRITICAL CARE TIME SPENT: 55 minutes spent performing frequent bedside reassessments and augmenting plan of care to address problems of acute critical illness that pose high probability of life threatening deterioration and/or end organ damage/dysfunction and discussing goals of care, non-inclusive of time spent on procedures performed. Patient is a 85y old  Female who presents with a chief complaint of chest pain, shortness of breath    BRIEF HOSPITAL COURSE: 84 yo female, PMHx HFpEF, known obstructive CAD (s/p cardiac cath 2019 in [place] which revealed LAD 70-80%, OM1 80%,  RCA), severe AS, HTN, newly diagnosed T2DM HgbA1c >9. She was recommended for CABG in [place], however family refused at that time for an unknown reason. [place] came to [place] December of last year, and established care with Cardiologist Dr. Karen Tobar. She was undergoing workup for TAVR candidacy, however was recommended to have cardiac cath prior to procedure. She had persistent leukocytosis WBC 13-14 and Hgb ~10 at baseline in [place], however on recent workup has had worsening anemia, new thrombocytopenia, and blasts. She had outpatient CTs as part of TAVR workup that revealed findings of bronchiectasis and cirrhosis.    Over the past 5 days, patient has had intermittent chest pain. Her chest pain is described as a "heaviness" that radiates to her jaw, left arm, and back, that spontaneously resolves. Her pain was previously so intense she was unable to move. She has also had progressively worsening shortness of breath when ambulating short distances, from the bed to bathroom, that improves with rest. Her daughter also notes symptoms of indigestion, poor PO intake, a nonproductive cough. She has had no fevers or urinary symptoms at home. Yesterday, she went to [facility] ED for evaluation of symptoms. She was told her troponin was normal, , her HGB was 8's, and platelets had dropped from ~75 to 25, and was discharged home. On review of lab results from [place], a troponin was not sent.    She now presents to [facility] ED for evaluation of acute worsening of her chest pain and shortness of breath today. Her troponin is 0.45, BNP 1790, lactate 4.7. WBC 18.9, plt 24. She spiked fever to 101'F, has been hemodynamically stable, with 's with frequent PVCs. There was question of an episode of ventricular tachycardia, however telemetry review reveals only ST with frequent PVCs. Mg 1.8, supplemented. POCUS revealed A-line predominance, with poor cardiac windows but concerns for reduced EF and apical hypokinesis.        PAST MEDICAL & SURGICAL HISTORY:  Diabetes mellitus  HTN  HFpEF  CAD  Severe AS  Cirrhosis  Bronchiectasis        SOCIAL HISTORY: Lives at home with her son. Never smoker. No EtOH use.         Review of Systems: See HPI.        Medications:        ICU Vital Signs Last 24 Hrs  T(C): 37.4 (2021 17:41), Max: 38.3 (2021 16:13)  T(F): 99.4 (2021 17:41), Max: 101 (2021 16:13)  HR: 109 (2021 17:41) (74 - 110)  BP: 128/60 (2021 17:41) (118/80 - 137/60)  BP(mean): --  ABP: --  ABP(mean): --  RR: 25 (2021 17:41) (16 - 32)  SpO2: 100% (2021 17:41) (96% - 100%)          I&O's Detail        LABS:                        9.1    18.90 )-----------( 24       ( 2021 17:53 )             26.9     06-05    133<L>  |  100  |  16  ----------------------------<  259<H>  4.1   |  21<L>  |  0.91    Ca    8.5      2021 16:47    TPro  9.3<H>  /  Alb  3.1<L>  /  TBili  0.9  /  DBili  x   /  AST  42<H>  /  ALT  27  /  AlkPhos  127<H>  06-05      CARDIAC MARKERS ( 2021 16:47 )  .494 ng/mL / x     / x     / x     / 4.4 ng/mL      CAPILLARY BLOOD GLUCOSE        PT/INR - ( 2021 16:47 )   PT: 13.8 sec;   INR: 1.19 ratio         PTT - ( 2021 16:47 )  PTT:26.9 sec  Urinalysis Basic - ( 2021 18:12 )    Color: Pale Yellow / Appearance: Clear / S.015 / pH: x  Gluc: x / Ketone: Small  / Bili: Negative / Urobili: Negative   Blood: x / Protein: 30 mg/dL / Nitrite: Negative   Leuk Esterase: Trace / RBC: x / WBC x   Sq Epi: x / Non Sq Epi: x / Bacteria: x      CULTURES:  Rapid RVP Result: NotDetec ( @ 16:42)            Physical Examination:    General: Toxic appearing. No acute distress.      HEENT: Pupils equal, reactive to light. Symmetric. Periorbital edema.    PULM: Clear to auscultation bilaterally, no wheezing.    CVS: Sinus tachycardia, +systolic murmur    ABD: Softly distended, mildly diffusely tender, normoactive bowel sounds, no masses    EXT: No edema, nontender. No calf tenderness.    SKIN: Warm and well perfused.    NEURO: Alert, oriented, interactive, nonfocal.        RADIOLOGY:       CODE STATUS: FULL        TIME SPENT: >60 minutes spent performing frequent bedside reassessments and augmenting plan of care to address problems of acute critical illness that pose high probability of life threatening deterioration and/or end organ damage/dysfunction and discussing goals of care, non-inclusive of time spent on procedures performed.

## 2021-06-05 NOTE — ED PROVIDER NOTE - CLINICAL SUMMARY MEDICAL DECISION MAKING FREE TEXT BOX
Pt is a 84 yo female with fever sob chest pain dark stools occult sent brown stool will get cta sepsis bolus not given due to CHF  and sob Pt is a 86 yo female with fever sob chest pain dark stools occult sent brown stool will get cta sepsis bolus not given due to CHF  and sob--pt with elevated lactate,  trop, ill appearing, icu consulted, cards consulted, pt was awaiting cabg and d/c [facility] due to low platelets and became ill today.

## 2021-06-05 NOTE — ED PROVIDER NOTE - CARE PLAN
Principal Discharge DX:	Fever  Secondary Diagnosis:	Elevated troponin I level  Secondary Diagnosis:	NSTEMI (non-ST elevated myocardial infarction)

## 2021-06-05 NOTE — CONSULT NOTE ADULT - ASSESSMENT
Hx from daughter at bedside at pt request.  Daughter is an endocrinologist.    85f with a limited hx until recently.  She has htn, oa, more recent dm.  She has had leukocytosis over the past year, which has been unexplained.  She had a compression fx in ~2019 in [place], and there was suspicion for mets at that time and an extensive eval was done then, apparently inconclusive. She had cardiac cath in [place] for unclear reasons in 2019, and apparently was discovered to have an occluded and collateralized rca, with severe disease of the lad and om.  She was medically managed at that time, for unclear reasons-the pt is not aware of having had the test, though the daughter states she is not demented.    More recently she has had more medical care because of htn and slurred speech.  A series of evaluations was done, including a tte which revealed severe low gradient AS (gradients not available).  She was being considered for cabg/avr and pci/tavr, and more recently the latter was being pursued.  Over the past 2 months she has had progressive mcdermott and over the past 2 weeks she has had diffuse body aches as well as some more localized chest discomfort, radiating to the back at times.  She was noted to have progressive thrombocytopenia, with her platelet count dropping from ~100 to ~25, and with progressive anemia as well.  ntg sl was prescribed 2d ago, taken once.  She has been taking asa and plavix.      She was brought to [place] yesterday by family for the platelet count, but were sent home given that she was not bleeding. Brought here today by her daughter.    In the er, she was febrile to 101, tachycardic.       -known severe multivessel cad  -has been on dapt despite low platelets, and given the count, this must be discontinued for now  -seems somewhat unlikely that she is having an unstable/acs type event, and is much more likely to be having a demand event given anemia, tachycardia, AS and cad  -cont bb and can give higher doses po/iv as needed and tolerated  -no heparin  -would aim for resting hr of 60, as belgica by bp  -statin  -given her apparent bone marrow issue, she cannot be considered a viable candidate for intervention even in the acute setting  -not clearly in heart failure based on exam or ct chest  -bnp level unreliable for heart failure in this setting  -trend troponins to asssess for the likelihood of acs, which again, is doubtful  -given the above is unlikely to be a candidate for surg or catheter avr, or pci  -monitor for arrhythmia  -echo  -demand tachycardia, bb as above  -fever may be infectious or related to malignancy  -should be evaluated by the cardiology fellow upon arrival. Her clinical condition does not seem to warrant ccu care at this time, but the breadth of her abnormalities warrant urgent transfer to a tertiary care facility    Upon my evaluation, this patient is at high risk for imminent or life threatening deterioration due to resp failure, hypotension,  and other active medical issues which require my direct attention, intervention, and personal management.  I have personally spent >90 minutes  of critical care time exclusive of time spent on separate billing procedures. This includes review of laboratory data, radiology results, discussion with primary team\patient, and monitoring for potential decompensation Interventions were performed as documented above.    -heme eval will need to be performed in an expedited fashion

## 2021-06-05 NOTE — CONSULT NOTE ADULT - ATTENDING COMMENTS
85F h/o DM, HTN, HFpEF (EF 65-70%), known CAD s/p cath (2019 in [place]) revealing 70-80% stenosis of LAD, 80% stenosis OM1,  RCA, severe AS, recommended for CABG in [place] but family deferred and came to [place] 12/[place] p/w chest pain and SOB. She has established care with [facility] cardiologist (Dr. Karen Tobar) and has been undergoing w/u for TAVR and was to undergo cardiac cath prior to procedure but due to new onset thrombocytopenia had been deferred for further heme w/u. Pt now reporting intermittent chest pain x5 days radiating to jaw, L arm and back. No exacerbating or alleviating factors. Symptoms acutely worsened today prompting ED visit. Of note, pt seen at [facility] ED yesterday for same, was told troponin wnl,  and Hb 8 with plts of 24 (previously 75 in May). On review of labwork provided by pt's daughter troponin was not sent. She was discharged at that time for continued outpt follow-up, but given worsening symptoms returned to our ED today. ED w/u here notable for trop 0.45, BNP 1790, lactate 4.7, WBC 19, plt 24 with no acute changes on EKG. CXR clear, UA slightly positive vs contaminated sample. During w/u she spiked T 101F, hemodynamically stable and HR in low 100s. Tele with frequent PVCs. POCUS performed showing a-line predominance, no consolidations or pleural effusions. Poor cardiac windows but no pericardial effusion, no RV dilation, and seemingly decreased LV systolic function with ?apical hypokinesis on apical 4 chamber view but unable to obtain good PSL view.       Case discussed extensively with cardiology, pt and her daughter. While her current presenting symptoms, seem less likely to be due to acute ischemia and more likely 2/2 increased demand due to progressive anemia and thrombocytopenia. Pt will require extensive hematologic w/u to determine etiology of her thrombocytopenia. On similar note, this degree of thrombocytopenia would preclude her from cardiac procedure that could be performed for her AS or cardiac cath. At this time, pt is better suited for tertiary care center for higher level of multidisciplinary care.

## 2021-06-05 NOTE — CONSULT NOTE ADULT - ASSESSMENT
84 yo female, PMHx HFpEF, known obstructive CAD (s/p cardiac cath 2019 in [place] which revealed LAD 70-80%, OM1 80%,  RCA), severe AS, HTN, T2DM, who presents from home with substernal chest pain and shortness of breath.      She is now febrile, with acute on chronic leukocytosis and lactic acidosis concerning for severe sepsis, though no focus of infection is evident at this point. RVP negative, COVID-19 pending. CT pending.  She has elevated troponin in the setting of known triple vessel disease concerning for NSTEMI, EKG without acute ST elevations, though troponemia may be related to demand ischemia in the setting of severe sepsis.  Cardiology was consulted and case was discussed at length.  84 yo female, PMHx HFpEF, known obstructive CAD (s/p cardiac cath 2019 in [place] which revealed LAD 70-80%, OM1 80%,  RCA), severe AS, HTN, T2DM, who presents from home with substernal chest pain and shortness of breath.      She is now febrile, with acute on chronic leukocytosis and lactic acidosis concerning for severe sepsis, though no focus of infection is evident at this point. RVP negative, COVID-19 pending. CT pending.  She has elevated troponin in the setting of known triple vessel disease concerning for NSTEMI, EKG without acute ST elevations, though troponemia may be related to demand ischemia in the setting of severe sepsis, anemia.  She also has progression of her thrombocytopenia of uncertain etiology over the past few months ? BM process vs cirrhosis  86 yo female, PMHx HFpEF, known obstructive CAD (s/p cardiac cath 2019 in [place] which revealed LAD 70-80%, OM1 80%,  RCA), severe AS, HTN, T2DM, who presents from home with substernal chest pain and shortness of breath.      She is now febrile, with acute on chronic leukocytosis and lactic acidosis concerning for severe sepsis, though no focus of infection is evident at this point. RVP negative, COVID-19 pending. CT pending. It is possible her fever is of non-infectious etiology.  She has elevated troponin in the setting of known triple vessel disease concerning for NSTEMI, EKG without acute ST elevations, though troponemia may be related to demand ischemia in the setting of ? severe sepsis, anemia, severe AS.  She also has progression of her thrombocytopenia of uncertain etiology over the past few months ? BM process vs cirrhosis    Case discussed extensively with Cardiology. Given the complexity of her multiple acute illnesses, it is our opinion that this patient would benefit from transfer to a tertiary care center for higher level of multidisciplinary care.

## 2021-06-06 NOTE — PROGRESS NOTE ADULT - ASSESSMENT
85f with a limited hx until recently.  She has htn, oa, more recent dm.  She has had leukocytosis over the past year, which has been unexplained.  She had a compression fx in ~2019 in [place], and there was suspicion for mets at that time and an extensive eval was done then, apparently inconclusive. She had cardiac cath in [place] for unclear reasons in 2019, and apparently was discovered to have an occluded and collateralized rca, with severe disease of the lad and om.  She was medically managed at that time, for unclear reasons-the pt is not aware of having had the test, though the daughter states she is not demented.    More recently she has had more medical care because of htn and slurred speech.  A series of evaluations was done, including a tte which revealed severe low gradient AS (gradients not available).  She was being considered for cabg/avr and pci/tavr, and more recently the latter was being pursued.  Over the past 2 months she has had progressive mcdermott and over the past 2 weeks she has had diffuse body aches as well as some more localized chest discomfort, radiating to the back at times.  She was noted to have progressive thrombocytopenia, with her platelet count dropping from ~100 to ~25, and with progressive anemia as well.  ntg sl was prescribed 2d ago, taken once.  She has been taking asa and plavix.      She was brought to [place] by family for the platelet count, but were sent home given that she was not bleeding. Brought to er by her daughter.    In the er, she was febrile to 101, tachycardic. Given her complex cardiac and heme situation she was transferred overnight and is now on floor      -known severe multivessel cad  -had been on dapt despite low platelets, and given the platelet count, this was discontinued   -noting a degree of reported hemoptysis, and the throbocytopenia, we cannot resume asa/plavix, despite the rising trops  -seems somewhat unlikely that she is having an unstable/acs type event, and is much more likely to be having a demand event given anemia, tachycardia, AS and cad. rising ck-mb noted  -her pains may in fact be musculoskeletal, and despite my best efforts, it is difficult to assess this even with daughter's assistance  -cont bb and can give higher doses po/iv as needed and tolerated. given her tachycardia and the apparent demand ischemia, if she does not respond to her po dose of bb this am would give metoprolol 5 iv q6 if needed, especially while she remains on bipap  -no heparin  -would aim for resting hr of 60, as belgica by bp  -statin  -given her apparent bone marrow issue, she cannot be considered a viable candidate for intervention even in the acute setting    -not clearly in heart failure based on exam or ct chest  -bnp level unreliable for heart failure in this setting    -given the above is unlikely to be a candidate for surg or catheter avr, or pci  -I have requested a consultation from CTS to document the issues as described and their implication on her candidacy for intervention    -monitor for arrhythmia  -echo  -probably demand related tachycardia, bb as above    -fever may be infectious or related to malignancy  - temp normal since that single elevated reading     -heme eval should be performed in an expedited fashion    -discussed this am with daughter who was sleeping at the bedside    Upon my evaluation, this patient is at high risk for imminent or life threatening deterioration due to resp failure, hypotension,  and other active medical issues which require my direct attention, intervention, and personal management.  I have personally spent >35 minutes  of critical care time exclusive of time spent on separate billing procedures. This includes review of laboratory data, radiology results, discussion with primary team, and monitoring for potential decompensation Interventions were performed as documented above.       85f with a limited hx until recently.  She has htn, oa, more recent dm.  She has had leukocytosis over the past year, which has been unexplained.  She had a compression fx in ~2019 in [place], and there was suspicion for mets at that time and an extensive eval was done then, apparently inconclusive. She had cardiac cath in [place] for unclear reasons in 2019, and apparently was discovered to have an occluded and collateralized rca, with severe disease of the lad and om.  She was medically managed at that time, for unclear reasons-the pt is not aware of having had the test, though the daughter states she is not demented.    More recently she has had more medical care because of htn and slurred speech.  A series of evaluations was done, including a tte which revealed severe low gradient AS (gradients not available).  She was being considered for cabg/avr and pci/tavr, and more recently the latter was being pursued.  Over the past 2 months she has had progressive mcdermott and over the past 2 weeks she has had diffuse body aches as well as some more localized chest discomfort, radiating to the back at times.  She was noted to have progressive thrombocytopenia, with her platelet count dropping from ~100 to ~25, and with progressive anemia as well.  ntg sl was prescribed 2d ago, taken once.  She has been taking asa and plavix.      She was brought to [place] by family for the platelet count, but were sent home given that she was not bleeding. Brought to er by her daughter.    In the er, she was febrile to 101, tachycardic. Given her complex cardiac and heme situation she was transferred overnight and is now on floor      -known severe multivessel cad  -had been on dapt despite low platelets, and given the platelet count, this was discontinued   -noting a degree of reported hemoptysis, and the throbocytopenia, we cannot resume asa/plavix, despite the rising trops  -seems somewhat unlikely that she is having an unstable/acs type event, and is much more likely to be having a demand event given anemia, tachycardia, AS and cad. rising ck-mb noted  -her pains may in fact be musculoskeletal, and despite my best efforts, it is difficult to assess this even with daughter's assistance  -cont bb and can give higher doses po/iv as needed and tolerated. given her tachycardia and the apparent demand ischemia, if she does not respond to her po dose of bb this am would give metoprolol 5 iv q6 if needed, especially while she remains on bipap  -no heparin  -would aim for resting hr of 60, as belgica by bp  -statin  -given her apparent bone marrow issue, she cannot be considered a viable candidate for intervention even in the acute setting    -not clearly in heart failure based on exam or ct chest  -bnp level unreliable for heart failure in this setting  -was sob and was given lasix and bipap, which she seems to be tolerating  -caution with diuretics  -attempt to wean bipap to nc    -given the above is unlikely to be a candidate for surg or catheter avr, or pci  -I have requested a consultation from CTS to document the issues as described and their implication on her candidacy for intervention    -monitor for arrhythmia  -echo  -probably demand related tachycardia, bb as above    -fever may be infectious or related to malignancy  - temp normal since that single elevated reading     -heme eval should be performed in an expedited fashion    -discussed this am with daughter who was sleeping at the bedside    Upon my evaluation, this patient is at high risk for imminent or life threatening deterioration due to resp failure, hypotension,  and other active medical issues which require my direct attention, intervention, and personal management.  I have personally spent >35 minutes  of critical care time exclusive of time spent on separate billing procedures. This includes review of laboratory data, radiology results, discussion with primary team, and monitoring for potential decompensation Interventions were performed as documented above.

## 2021-06-06 NOTE — H&P ADULT - PROBLEM SELECTOR PLAN 7
Was pending TAVR as outpatient  -hold further fluids Patient noted to be wheezing on exam, w/ R pulm congestion likely 2/2 volume overload due 1L NS bolus iso severe AS   -start bipap 8/5 30%   -hold lasix for now Patient noted to be wheezing on exam.  -hold lasix for now  - clinically euvolemic  - TTE

## 2021-06-06 NOTE — SWALLOW BEDSIDE ASSESSMENT ADULT - SWALLOW EVAL: DIAGNOSIS
Pt p/w concern for MDS vs leukemia. Oropharyngeal swallow skills p/w adequate orientation/reception, prolonged mastication however eventually effective, reduced control of bolus leading to suspected premature spillage behind the BoT prior to the swallow trigger with subsequent delayed cough response with less viscous consistencies. +GERD. Notable increase in WOB with less viscous consistencies and with straw.

## 2021-06-06 NOTE — SWALLOW BEDSIDE ASSESSMENT ADULT - COMMENTS
Continued:  (TropI 0.494, CKMB 4.4, BNP 1790) w/o EKG changes c/f NSTEMI vs. demand ischemia. Evaluated by cardiology. Recommendation to hold hep gtt and DAPT due to thrombocytopenia and transfer to tertiary center for higher acuity of care. Also evaluated by critical care, found to have low EF and apical hypokinesis on bedside POCUS. Per son, no hx of cancer in patient or family. Per daughter, flow cytometry checked in [place] in 2020 was inconclusive. Blasts noted on CBC within the last week only.     On presentation to [facility], tachypenic to 30. Noted to have wheezing. Temp 98.5,  (sinus tach on tele), 132/73, RR 30, 100% 2L NC. +wheezing and coughing with blood tinged white phlegm. SOB is improving with 2L NC. Endorses abd pain at sides of abd wall and mild back pain.  Currently denies any chest pain but family member does note increased work of breathing and SOB that worsened after she received IVF at OSH. Patient was ultimately admitted to telemetry for further management. Daughter reports that she was coughing up blood overnight    CT C/A/P at [facility] was notable for eccentric wall thickening involving the distal esophagus, but negative for aortic dissection.   -Heme/onc consulted given blasts on CBC w/ diff.   -CT surgery consulted for evaluation given pt's h/o CAD and severe AS.     Swallow history: Pt is new to this service. Family denied history of dysphagia. Continued:  (TropI 0.494, CKMB 4.4, BNP 1790) w/o EKG changes c/f NSTEMI vs. demand ischemia. Evaluated by cardiology. Recommendation to hold hep gtt and DAPT due to thrombocytopenia and transfer to tertiary center for higher acuity of care. Also evaluated by critical care, found to have low EF and apical hypokinesis on bedside POCUS. Per son, no hx of cancer in patient or family. Per daughter, flow cytometry checked in [place] in 2020 was inconclusive. Blasts noted on CBC within the last week only.     On presentation to [facility], tachypenic to 30. Noted to have wheezing. Temp 98.5,  (sinus tach on tele), 132/73, RR 30, 100% 2L NC. +wheezing and coughing with blood tinged white phlegm. SOB is improving with 2L NC. Endorses abd pain at sides of abd wall and mild back pain.  Currently denies any chest pain but family member does note increased work of breathing and SOB that worsened after she received IVF at OSH. Patient was ultimately admitted to telemetry for further management. Daughter reports that she was coughing up blood overnight    CT C/A/P at [facility] was notable for eccentric wall thickening involving the distal esophagus, but negative for aortic dissection.   -Heme/onc consulted given blasts on CBC w/ diff.   -CT surgery consulted for evaluation given pt's h/o CAD and severe AS.     Swallow history: Pt is new to this service. Family denied history of dysphagia.  Daughter endorsed coughing episode with carbonated beverage during hospital course.

## 2021-06-06 NOTE — SWALLOW BEDSIDE ASSESSMENT ADULT - NS SPL SWALLOW CLINIC TRIAL FT
Pt denied 'stuck sensation'.   Delayed coughing w/ thin liquids via straw - single and serial sips   Increase in WOB with thin liquids   Improvement with nectar thick liquids vs thin liquids  No s/sx of aspiration with solid textures presented

## 2021-06-06 NOTE — PROGRESS NOTE ADULT - ASSESSMENT
84 yo German speaking female with a PMHx of CAD w/ TVD (cardiac cath 2019 in [place] revealed LAD 70-80%, OM1 80%,  RCA, family declined CABG at that time), HFpEF, severe AS, HTN, recently dx T2DM (A1C >9), chronic leukocytosis w/ blasts (WBC 13-14 in [place] in 2020), anemia, thrombocytopenia, and GERD presenting as transfer from [facility] where she was seen for 5 days of substernal cp that radiates to left jaw, left arm and back w/ associated progressive SANON x 5 days found to be septic possibly 2/2 UTI vs aspiration PNA given tracheal secretions on CTAP w/ elevated CE c/f NSTEMI transferred to [facility] for cardiology evaluation.

## 2021-06-06 NOTE — PHYSICAL THERAPY INITIAL EVALUATION ADULT - PRECAUTIONS/LIMITATIONS, REHAB EVAL
Presenting as transfer from [facility] where she was seen for 5 days of substernal cp that radiates to left jaw, left arm and back w/ associated progressive SANON x 5 days found to be septic possibly 2/2 UTI vs aspiration PNA given tracheal secretions on CTAP w/ elevated CE c/f NSTEMI transferred to [facility] for cardiology evaluation.. Presenting as transfer from [facility] where she was seen for 5 days of substernal cp that radiates to left jaw, left arm and back w/ associated progressive SANON x 5 days found to be septic possibly 2/2 UTI vs aspiration PNA given tracheal secretions on CTAP w/ elevated CE c/f NSTEMI transferred to [facility] for cardiology evaluation../cardiac precautions/fall precautions

## 2021-06-06 NOTE — SWALLOW BEDSIDE ASSESSMENT ADULT - SLP PERTINENT HISTORY OF CURRENT PROBLEM
This is an 84 y/o Lithuanian speaking female with a PMHx of CAD w/ TVD (cardiac cath 2019 in [place] revealed LAD 70-80%, OM1 80%,  RCA, family declined CABG at that time), HFpEF, severe AS, HTN, recently dx T2DM (A1C >9), chronic leukocytosis (WBC 13-14 in [place] in 2020), anemia/thrombocytopenia acutely worsening recently (plt 70s-->20s) w/ blasts, and GERD presenting as transfer from [facility] where she was seen for 5 days of substernal cp that radiates to left jaw, left arm and back w/ associated progressive SANON x 5 days. Found to be septic w/ Tmax 101, WBC 18, 7% bands, 10% blasts, lactate 4.7 --> 3, sinus tach 110 w/ PVCs w/ mildly positive UA (11-25 WBC, trace LE, occasional bacteria) and w/ elevated cardiac enzymes

## 2021-06-06 NOTE — PROGRESS NOTE ADULT - PROBLEM SELECTOR PLAN 8
Was pending TAVR as outpatient  -hold further fluids Was pending TAVR as outpatient  -avoid further fluids

## 2021-06-06 NOTE — PHYSICAL THERAPY INITIAL EVALUATION ADULT - GENERAL OBSERVATIONS, REHAB EVAL
Received pt bedside, +tele, O2 NC 2L. IV disconnected by nurse. BP as per pca- 114/76, , O2 sats 96% on NC.

## 2021-06-06 NOTE — H&P ADULT - PROBLEM SELECTOR PLAN 10
DVT: SCD iso thrombocytopenia  Diet: Soft/Halal/DASC/CC  Dispo: likely home DVT: SCD iso thrombocytopenia  Diet: Soft/Halal/DASC/CC  Dispo: likely home  Code: full per family DVT: SCD iso thrombocytopenia  Diet: Soft/Halal/DASC/CC  Dispo: likely home  Code: full per family, consider palliative consult

## 2021-06-06 NOTE — PROGRESS NOTE ADULT - PROBLEM SELECTOR PLAN 6
Normocytic anemia (bl 11-12), thrombocytopenia (bl plt 75, recent downtrend to 35 as outpatient)  -keep active T&S  -heme onc consult in AM  -transfuse Hb <7, transfuse plt <15 if febrile normocytic anemia (bl 11-12), thrombocytopenia (bl plt 75, recent downtrend to 35 as outpatient)  -keep active T&S  -heme/onc consult in AM  -transfuse Hb <7, transfuse plt <15 if febrile normocytic anemia (bl 11-12), thrombocytopenia (bl plt 75, recent downtrend to 35 as outpatient)  -keep active T&S  -heme/onc consult in AM  -transfuse Hb <7, transfuse plt <15 if febrile  -iron studies consistent with anemia of chronic disease normocytic anemia (bl 11-12), thrombocytopenia (bl plt 75, recent downtrend to 35 as outpatient)  -keep active T&S  -heme/onc consult in AM  -transfuse Hb <7, transfuse plt <15 if febrile  -iron studies consistent with anemia of chronic disease+iron def

## 2021-06-06 NOTE — SWALLOW BEDSIDE ASSESSMENT ADULT - ORAL PHASE
able to strip the bolus from the tsp; labial seal adequate for cup drinking; no anterior leakage. Prolonged mastication 2/2 dental state / condition

## 2021-06-06 NOTE — H&P ADULT - PROBLEM SELECTOR PLAN 6
Patient noted to be wheezing on exam, w/ R pulm congestion likely 2/2 volume overload due 1L NS bolus iso severe AS   -start bipap 8/5 30%   -c/w home lasix 40mg   -hold IV lasix as do not want to Patient noted to be wheezing on exam, w/ R pulm congestion likely 2/2 volume overload due 1L NS bolus iso severe AS   -start bipap 8/5 30%   -c/w home lasix 40mg   -f/u cards recs concerning IV lasix Patient noted to be wheezing on exam, w/ R pulm congestion likely 2/2 volume overload due 1L NS bolus iso severe AS   -lasix 40mg IV x 1 now   -start bipap 8/5 30%   -c/w home lasix 40mg Patient noted to be wheezing on exam, w/ R pulm congestion likely 2/2 volume overload due 1L NS bolus iso severe AS   -lasix 40mg IV x 1 now   -start bipap 8/5 30%   -c/w home lasix 40mg  -hold IV lasix iso severe AS for now Normocytic anemia (bl 11-12), thrombocytopenia (bl plt 75, recent downtrend to 35 as outpatient)  -keep active T&S  -heme onc consult in AM  -transfuse Hb <7, transfuse plt <15 if febrile

## 2021-06-06 NOTE — CONSULT NOTE ADULT - ASSESSMENT
Impression:  # esophageal thickening - ddx includes esophagitis (2/2 reflux vs other) vs malignancy. Currently being evaluated for heme malignancy and NSTEMI, so will empirically treat w/ PPI. Can pursue nonemergent EGD once improved  # NSTEMI  # CAD  # blasts on CBC    Recommendations:  - pantoprazole 40mg BID, can do IV while inpatient  - advance diet as tolerated  - workup per cardiology and heme/onc  - nonemergent EGD, can be performed later this admission vs as outpatient  - rest of care per primary team    GI will continue to follow.     Shon Cardoso, PGY4  Gastroenterology Fellow  Available on Microsoft Teams  [phone] ([employer] Short Range Pager)  [phone] (Long Range Pager)    After 5pm, please contact the on-call GI fellow. [phone] Impression:  # esophageal thickening - ddx includes esophagitis (2/2 reflux vs other) vs malignancy. Currently being evaluated for heme malignancy and NSTEMI, so will empirically treat w/ PPI. Can pursue nonemergent EGD once improved  # NSTEMI  # CAD  # chest pain; h/o signif. aortic stenosis, awaiting echocardiogram  # sepsis  # blasts on CBC    Recommendations:  - pantoprazole 40mg BID, can do IV while inpatient  - advance diet as tolerated  - workup per cardiology and heme/onc  - nonemergent EGD, can be performed later this admission vs as outpatient  - rest of care per primary team    GI will continue to follow.     Shon Cardoso, PGY4  Gastroenterology Fellow  Available on Microsoft Teams  [phone] ([employer] Short Range Pager)  [phone] (Long Range Pager)    After 5pm, please contact the on-call GI fellow. [phone]

## 2021-06-06 NOTE — H&P ADULT - HISTORY OF PRESENT ILLNESS
86 yo female with a PMHx of CAD w/ TVD (cardiac cath 2019 in [place] revealed LAD 70-80%, OM1 80%,  RCA, family declined CABG at that time), HFpEF, severe AS, HTN, recently dx T2DM (A1C >9), chronic leukocytosis (WBC 13-14 in [place] in 2019), anemia/thrombocytopenia acutely worsening recently (plt 70s-->20s) w/ blasts, presenting as transfer from [facility] where she was seen for 5 days of substernal cp that radiates to left jaw, left arm and back w/ associated progressive SANON x 5 days. Found to be septic w/ Tmax 101, WBC 18, 7% bands, 10% blasts, lactate 4.7 --> 3, sinus tach 110 w/ PVCs w/ mildly positive UA (11-25 WBC, trace LE, occasional bacteria) and w/ elevated cardiac enzymes (TropI 0.494, CKMB 4.4, BNP 1790) w/o EKG changes c/f NSTEMI vs. demand ischemia. Evaluated by cardiology. Recommendation to hold hep gtt and DAPT due to thrombocytopenia and transfer to tertiary center for higher acuity of care. Also evaluated by critical care, found to have low EF and apical hypokinesis on bedside POCUS.    On presentation to [facility], tachypenic to 30. Noted to have wheezing.     PV workup: CTA chest, abd pelvis neg for dissection.   PV course: s/p vanc, zosyn x 1, 1L NS x 1 Hx obtained form patient, chart review, and son [place].     84 yo female with a PMHx of CAD w/ TVD (cardiac cath 2019 in [place] revealed LAD 70-80%, OM1 80%,  RCA, family declined CABG at that time), HFpEF, severe AS, HTN, recently dx T2DM (A1C >9), chronic leukocytosis (WBC 13-14 in [place] in 2019), anemia/thrombocytopenia acutely worsening recently (plt 70s-->20s) w/ blasts, presenting as transfer from [facility] where she was seen for 5 days of substernal cp that radiates to left jaw, left arm and back w/ associated progressive SANON x 5 days. Found to be septic w/ Tmax 101, WBC 18, 7% bands, 10% blasts, lactate 4.7 --> 3, sinus tach 110 w/ PVCs w/ mildly positive UA (11-25 WBC, trace LE, occasional bacteria) and w/ elevated cardiac enzymes (TropI 0.494, CKMB 4.4, BNP 1790) w/o EKG changes c/f NSTEMI vs. demand ischemia. Evaluated by cardiology. Recommendation to hold hep gtt and DAPT due to thrombocytopenia and transfer to tertiary center for higher acuity of care. Also evaluated by critical care, found to have low EF and apical hypokinesis on bedside POCUS. Per son, no hx of cancer in patient or family.     On presentation to [facility], tachypenic to 30. Noted to have wheezing. Temp 98.5, , 132/73, RR 30, 100% 2L NC    PV workup: CTA chest, abd pelvis neg for dissection, revealing eccentric wall thickening of the eso, further w/u to r/o malignancy recommended   PV course: s/p vanc, zosyn x 1, 1L NS x 1 Hx obtained form patient, chart review, son Boston and daughter Dr. Felipe at bedside. Patient Tamazight speaking. Translation provided by Tamazight speaking MD.     86 yo Romansh speaking female with a PMHx of CAD w/ TVD (cardiac cath 2019 in [place] revealed LAD 70-80%, OM1 80%,  RCA, family declined CABG at that time), HFpEF, severe AS, HTN, recently dx T2DM (A1C >9), chronic leukocytosis (WBC 13-14 in [place] in 2020), anemia/thrombocytopenia acutely worsening recently (plt 70s-->20s) w/ blasts, presenting as transfer from [facility] where she was seen for 5 days of substernal cp that radiates to left jaw, left arm and back w/ associated progressive SANON x 5 days. Found to be septic w/ Tmax 101, WBC 18, 7% bands, 10% blasts, lactate 4.7 --> 3, sinus tach 110 w/ PVCs w/ mildly positive UA (11-25 WBC, trace LE, occasional bacteria) and w/ elevated cardiac enzymes (TropI 0.494, CKMB 4.4, BNP 1790) w/o EKG changes c/f NSTEMI vs. demand ischemia. Evaluated by cardiology. Recommendation to hold hep gtt and DAPT due to thrombocytopenia and transfer to tertiary center for higher acuity of care. Also evaluated by critical care, found to have low EF and apical hypokinesis on bedside POCUS. Per son, no hx of cancer in patient or family.     On presentation to [facility], tachypenic to 30. Noted to have wheezing. Temp 98.5, , 132/73, RR 30, 100% 2L NC    PV workup: CTA chest, abd pelvis neg for dissection, revealing eccentric wall thickening of the eso, further w/u to r/o malignancy recommended   PV course: s/p vanc, zosyn x 1, 1L NS x 1 Hx obtained form patient, chart review, son Boston and daughter  Nicolássuyapa at bedside. Patient Faroese speaking. Translation provided by Faroese speaking MD.     86 yo Estonian speaking female with a PMHx of CAD w/ TVD (cardiac cath 2019 in [place] revealed LAD 70-80%, OM1 80%,  RCA, family declined CABG at that time), HFpEF, severe AS, HTN, recently dx T2DM (A1C >9), chronic leukocytosis (WBC 13-14 in [place] in 2020), anemia/thrombocytopenia acutely worsening recently (plt 70s-->20s) w/ blasts, and GERD presenting as transfer from [facility] where she was seen for 5 days of substernal cp that radiates to left jaw, left arm and back w/ associated progressive SANON x 5 days. Found to be septic w/ Tmax 101, WBC 18, 7% bands, 10% blasts, lactate 4.7 --> 3, sinus tach 110 w/ PVCs w/ mildly positive UA (11-25 WBC, trace LE, occasional bacteria) and w/ elevated cardiac enzymes (TropI 0.494, CKMB 4.4, BNP 1790) w/o EKG changes c/f NSTEMI vs. demand ischemia. Evaluated by cardiology. Recommendation to hold hep gtt and DAPT due to thrombocytopenia and transfer to tertiary center for higher acuity of care. Also evaluated by critical care, found to have low EF and apical hypokinesis on bedside POCUS. Per son, no hx of cancer in patient or family. Per daughter, flow cytometry checked in [facility] in 2020 was inconclusive. Blasts noted on CBC within the last week only.     On presentation to [facility], tachypenic to 30. Noted to have wheezing. Temp 98.5,  (sinus tach on tele), 132/73, RR 30, 100% 2L NC    Patient seen and examined at bedside, noted to be wheezing and coughing with blood tinged white phlegm. SOB is improving with 2L NC. Endorses abd pain at sides of abd wall and mild back pain. No current cp. No fevers at home (low grade temp 99 day of presentation). No coughing prior to NS admission. +dysuria x 1 day, +nausea and vomiting x 1 this AM. Of note, patient on ASA and plavix at home, ASA only added within last week.     PV workup: CTA chest, abd pelvis neg for dissection, revealing eccentric wall thickening of the eso, further w/u to r/o malignancy recommended   PV course: s/p vanc, zosyn x 1, 1L NS x 1 Hx obtained form patient, chart review, son Boston and daughter  Nicolássuyapa at bedside. Patient Nepali speaking. Translation provided by Nepali speaking MD.     84 yo Khmer speaking female with a PMHx of CAD w/ TVD (cardiac cath 2019 in [place] revealed LAD 70-80%, OM1 80%,  RCA, family declined CABG at that time), HFpEF, severe AS, HTN, recently dx T2DM (A1C >9), chronic leukocytosis (WBC 13-14 in [place] in 2020), anemia/thrombocytopenia acutely worsening recently (plt 70s-->20s) w/ blasts, and GERD presenting as transfer from [facility] where she was seen for 5 days of substernal cp that radiates to left jaw, left arm and back w/ associated progressive SANON x 5 days. Found to be septic w/ Tmax 101, WBC 18, 7% bands, 10% blasts, lactate 4.7 --> 3, sinus tach 110 w/ PVCs w/ mildly positive UA (11-25 WBC, trace LE, occasional bacteria) and w/ elevated cardiac enzymes (TropI 0.494, CKMB 4.4, BNP 1790) w/o EKG changes c/f NSTEMI vs. demand ischemia. Evaluated by cardiology. Recommendation to hold hep gtt and DAPT due to thrombocytopenia and transfer to tertiary center for higher acuity of care. Also evaluated by critical care, found to have low EF and apical hypokinesis on bedside POCUS. Per son, no hx of cancer in patient or family. Per daughter, flow cytometry checked in [facility] in 2020 was inconclusive. Blasts noted on CBC within the last week only.     On presentation to [facility], tachypenic to 30. Noted to have wheezing. Temp 98.5,  (sinus tach on tele), 132/73, RR 30, 100% 2L NC    Patient seen and examined at bedside, noted to be wheezing and coughing with blood tinged white phlegm. SOB is improving with 2L NC. Endorses abd pain at sides of abd wall and mild back pain. No current cp. No fevers at home (low grade temp 99 day of presentation). No coughing prior to NS admission. +dysuria x 1 day, +nausea and vomiting x 1 this AM. Of note, patient on ASA and plavix at home, ASA only added within last week.     PV workup: CTA chest, abd pelvis neg for dissection, revealing eccentric wall thickening of the eso, further w/u to r/o malignancy recommended, moderate SMA stenosis   PV course: s/p vanc, zosyn x 1, 1L NS x 1 Hx obtained form patient, chart review, son Boston and daughter  Nicolássuyapa at bedside. Patient Persian speaking. Translation provided by Persian speaking MD.     86 yo Yi speaking female with a PMHx of CAD w/ TVD (cardiac cath 2019 in [place] revealed LAD 70-80%, OM1 80%,  RCA, family declined CABG at that time), HFpEF, severe AS, HTN, recently dx T2DM (A1C >9), chronic leukocytosis (WBC 13-14 in [place] in 2020), anemia/thrombocytopenia acutely worsening recently (plt 70s-->20s) w/ blasts, and GERD presenting as transfer from [facility] where she was seen for 5 days of substernal cp that radiates to left jaw, left arm and back w/ associated progressive SANON x 5 days. Found to be septic w/ Tmax 101, WBC 18, 7% bands, 10% blasts, lactate 4.7 --> 3, sinus tach 110 w/ PVCs w/ mildly positive UA (11-25 WBC, trace LE, occasional bacteria) and w/ elevated cardiac enzymes (TropI 0.494, CKMB 4.4, BNP 1790) w/o EKG changes c/f NSTEMI vs. demand ischemia. Evaluated by cardiology. Recommendation to hold hep gtt and DAPT due to thrombocytopenia and transfer to tertiary center for higher acuity of care. Also evaluated by critical care, found to have low EF and apical hypokinesis on bedside POCUS. Per son, no hx of cancer in patient or family. Per daughter, flow cytometry checked in [facility] in 2020 was inconclusive. Blasts noted on CBC within the last week only.     On presentation to [facility], tachypenic to 30. Noted to have wheezing. Temp 98.5,  (sinus tach on tele), 132/73, RR 30, 100% 2L NC    Patient seen and examined at bedside, noted to be wheezing and coughing with blood tinged white phlegm. SOB is improving with 2L NC. Endorses abd pain at sides of abd wall and mild back pain. No current cp. No fevers at home (low grade temp 99 day of presentation). No coughing prior to NS admission. +dysuria x 1 day, +nausea and vomiting x 1 this AM. Of note, patient on ASA and plavix at home, ASA only added within last week.     PV vitals: Tmax 101, , -130, 100% 2L NC   PV workup: CTA chest, abd pelvis neg for dissection, secretions in extrathoracic trachea, revealing eccentric wall thickening of the eso, further w/u to r/o malignancy recommended, moderate SMA stenosis   PV course: s/p vanc, zosyn x 1, 1L NS x 1 Pt was evaluated prior to midnight on 6/5/21.  Hx obtained form patient, chart review, son Boston and daughter Rc Matteo at bedside. Patient Mongolian speaking. Translation provided by Mongolian speaking MD.     86 yo Swedish speaking female with a PMHx of CAD w/ TVD (cardiac cath 2019 in [place] revealed LAD 70-80%, OM1 80%,  RCA, family declined CABG at that time), HFpEF, severe AS, HTN, recently dx T2DM (A1C >9), chronic leukocytosis (WBC 13-14 in [place] in 2020), anemia/thrombocytopenia acutely worsening recently (plt 70s-->20s) w/ blasts, and GERD presenting as transfer from [facility] where she was seen for 5 days of substernal cp that radiates to left jaw, left arm and back w/ associated progressive SANON x 5 days. Found to be septic w/ Tmax 101, WBC 18, 7% bands, 10% blasts, lactate 4.7 --> 3, sinus tach 110 w/ PVCs w/ mildly positive UA (11-25 WBC, trace LE, occasional bacteria) and w/ elevated cardiac enzymes (TropI 0.494, CKMB 4.4, BNP 1790) w/o EKG changes c/f NSTEMI vs. demand ischemia. Evaluated by cardiology. Recommendation to hold hep gtt and DAPT due to thrombocytopenia and transfer to tertiary center for higher acuity of care. Also evaluated by critical care, found to have low EF and apical hypokinesis on bedside POCUS. Per son, no hx of cancer in patient or family. Per daughter, flow cytometry checked in [facility] in 2020 was inconclusive. Blasts noted on CBC within the last week only.     On presentation to [facility], tachypenic to 30. Noted to have wheezing. Temp 98.5,  (sinus tach on tele), 132/73, RR 30, 100% 2L NC    Patient seen and examined at bedside, noted to be wheezing and coughing with blood tinged white phlegm. SOB is improving with 2L NC. Endorses abd pain at sides of abd wall and mild back pain. No current cp. No fevers at home (low grade temp 99 day of presentation). No coughing prior to NS admission. +dysuria x 1 day, +nausea and vomiting x 1 this AM. Of note, patient on ASA and plavix at home, ASA only added within last week.     PV vitals: Tmax 101, , -130, 100% 2L NC   PV workup: CTA chest, abd pelvis neg for dissection, secretions in extrathoracic trachea, revealing eccentric wall thickening of the eso, further w/u to r/o malignancy recommended, moderate SMA stenosis   PV course: s/p vanc, zosyn x 1, 1L NS x 1

## 2021-06-06 NOTE — H&P ADULT - NSICDXPASTMEDICALHX_GEN_ALL_CORE_FT
PAST MEDICAL HISTORY:  CAD (coronary artery disease)     Chronic diastolic congestive heart failure     Diabetes mellitus     Hypertension     Severe aortic stenosis

## 2021-06-06 NOTE — H&P ADULT - PROBLEM SELECTOR PLAN 9
Newly diagnosed, on 0.5 repaglinide but unclear if patient complint on this   -check A1C   -low ISS Newly diagnosed, on 0.5 repaglinide but unclear if patient compliant on this   -check A1C   -low ISS

## 2021-06-06 NOTE — H&P ADULT - PROBLEM SELECTOR PLAN 1
Patient w/ hx of TVD, declined CABG in past, now p/w 5 days of cp, found to have elevated CE (CKMB 4.4, Trop I 0.494) w/o EKG changes   -PV cardiology recs appreciated, less likely ACS image, CE likely elevated iso demand from sepsis, recommended holding DAPT and hep gtt iso  -per critical care note at [facility], EF appears low w/ basal hypokinesis, 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 cardiomyopathy   -will hold DAPT and hep gtt for now   -stat cardiology consult at [facility]  -start atorvastatin 80 mg  -c/w home metoprolol   -check TTE Patient w/ hx of TVD, declined CABG in past, now p/w 5 days of cp, found to have elevated CE (CKMB 4.4, Trop I 0.494) w/o EKG changes   -PV cardiology recs appreciated, less likely ACS image, CE likely elevated iso demand from sepsis, recommended holding DAPT and hep gtt iso  -per critical care note at [facility], EF appears low w/ basal hypokinesis, 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 cardiomyopathy   -will hold DAPT and hep gtt for now   -stat cardiology consult at [facility]  -start atorvastatin 80 mg  -c/w home metoprolol 25 BID   -check TSH, lipids, A1C   -check TTE Patient w/ hx of TVD, declined CABG in past, now p/w 5 days of cp, found to have elevated CE (CKMB 4.4, Trop I 0.494) w/o EKG changes   -EKG at [facility] showing sinus tach 110 w/ LBBB, TWI I, AVL (nonspecific iso LBBB)   - cardiology recs appreciated, less likely ACS image, CE likely elevated iso demand from sepsis, recommended holding DAPT and hep gtt iso  -per critical care note at [facility], EF appears low w/ basal hypokinesis, 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 cardiomyopathy   -will hold DAPT and hep gtt for now   -cardiology consulted at [facility]  -start atorvastatin 80 mg  -c/w home metoprolol 25 BID   -check TSH, lipids, A1C   -check TTE Patient w/ hx of TVD, declined CABG in past, now p/w 5 days of cp, found to have elevated CE (CKMB 4.4, Trop I 0.494) w/o EKG changes   -EKG at [facility] showing sinus tach 110 w/ LBBB, TWI I, AVL (nonspecific iso LBBB)   - cardiology recs appreciated, CE likely elevated iso demand from sepsis, recommended holding DAPT and hep gtt iso  -per critical care note at [facility], EF appears low w/ basal hypokinesis, 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 cardiomyopathy   -will hold DAPT and hep gtt for now   -cardiology consulted at [facility]  -start atorvastatin 80 mg  -c/w home metoprolol 25 BID   -trend CE q8 hrs   -check TSH, lipids, A1C   -check TTE Patient w/ hx of TVD, declined CABG in past, now p/w 5 days of cp, found to have elevated CE (CKMB 4.4, Trop I 0.494) w/o EKG changes   -EKG at [facility] showing sinus tach 110 w/ LBBB, TWI I, AVL (nonspecific iso LBBB)   - cardiology recs appreciated, CE likely elevated iso demand from sepsis, recommended holding DAPT and hep gtt iso  -per critical care note at [facility], EF appears low w/ basal hypokinesis  -will hold DAPT and hep gtt for now   -cardiology consulted at [facility]  -start atorvastatin 80 mg  -c/w home metoprolol 25 BID   -trend CE q8 hrs   -check TSH, lipids, A1C   -check TTE

## 2021-06-06 NOTE — H&P ADULT - PROBLEM SELECTOR PLAN 4
Patient w/ baseline leukocytosis 13-14 since 2019 (in [place]), w/ blasts seen on outpatient w/u  -10% blasts noted during this admission, elevated protein gap as noted   -CTA C/A/P revealing eccentric thickening of distal eso, further w/o to r/o malignancy recommended  -heme/onc consult in AM  -GI consult in AM Patient tachypneic on presentation to [facility], requiring 2L NC. No hypercapnia on VBG   -likely aspiration PNA given tracheal secretions on CTA w/o focal consolidation, less likely PE as CTA chest neg. ddx also includes fluid overload iso AS and 1L NS  -bipap for inc wob

## 2021-06-06 NOTE — SWALLOW BEDSIDE ASSESSMENT ADULT - PHARYNGEAL PHASE
suspected; Reflux suspected 2/2 frequent eructation; daughter reports pt has GERD./Delayed pharyngeal swallow

## 2021-06-06 NOTE — PROGRESS NOTE ADULT - PROBLEM SELECTOR PLAN 2
Patient w/ Tmax 101, , WBC 18, 7% bands, 10% blasts, lactate 4.7 --> 3. ddx includes sepsis 2/2 UTI vs. ?asp PNA given cough and tracheal secretions w/o focal consolidation on CTA chest vs SIRS 2/2 possible malignancy   -UA w/ 11-25 WBC, trace LE, occasional bacteria  -COVID/RVP negative at [facility] negative  -CXR at [facility] revealing R pulm congestion, no edema  -s/p vanc/zosyn x 1 at  ED  -c/w zosyn for now  -f/u BCx, UCx from [facility]  -check sputum cx  -HOB >30, aspiration precautions  -speech and swallow in AM Patient w/ Tmax 101, , WBC 18, 7% bands, 10% blasts, lactate 4.7 --> 3. ddx includes sepsis 2/2 UTI vs. ?asp PNA given cough and tracheal secretions w/o focal consolidation on CTA chest vs SIRS 2/2 possible malignancy   -UA w/ 11-25 WBC, trace LE, occasional bacteria  -COVID/RVP negative at [facility] negative  -CXR at [facility] revealing R pulm congestion, no edema  -s/p vanc/zosyn x 1 at  ED  -c/w zosyn for now  -f/u BCx, UCx from [facility]  -check sputum cx  -HOB >30, aspiration precautions  -speech and swallow eval done  -possible bronchitis/bronchiectasis

## 2021-06-06 NOTE — PROGRESS NOTE ADULT - PROBLEM SELECTOR PLAN 9
Newly diagnosed, on 0.5 repaglinide but unclear if patient compliant on this   -check A1C   -low ISS Newly diagnosed, on 0.5 repaglinide but unclear if patient compliant on this   -A1C 9.7%  -ISS, monitor FSBG Newly diagnosed, on 0.5 repaglinide but unclear if patient compliant on this   -A1C 9.7%  -ISS, monitor FSBG    #poor dentition  -obtain dental evaluation

## 2021-06-06 NOTE — H&P ADULT - PROBLEM SELECTOR PLAN 2
Patient w/ Tmax 101, , WBC 18, 7% bands, 10% blasts, lactate 4.7 --> 3. ddx includes sepsis 2/2 UTI vs. SIRS 2/2 possible malignancy   -UA w/ 11-25 WBC, trace LE, occasional bacteria  -s/p vanc/zosyn x 1 at [facility]  -c/w vanc/zosyn   -f/u BCx, UCx from [facility] Patient w/ Tmax 101, , WBC 18, 7% bands, 10% blasts, lactate 4.7 --> 3. ddx includes sepsis 2/2 UTI vs. ?PNA given cough vs SIRS 2/2 possible malignancy   -UA w/ 11-25 WBC, trace LE, occasional bacteria  -COVID/RVP negative at [facility] negative  -CXR at [facility] revealing R pulm congestion, no edema   -s/p vanc/zosyn x 1 at  ED  -c/w vanc/zosyn   -f/u BCx, UCx from [facility]  -check sputum Patient w/ Tmax 101, , WBC 18, 7% bands, 10% blasts, lactate 4.7 --> 3. ddx includes sepsis 2/2 UTI vs. ?PNA given cough vs SIRS 2/2 possible malignancy   -UA w/ 11-25 WBC, trace LE, occasional bacteria  -COVID/RVP negative at [facility] negative  -CXR at [facility] revealing R pulm congestion, no edema   -s/p vanc/zosyn x 1 at  ED  -c/w vanc/zosyn   -f/u BCx, UCx from [facility]  -check sputum cx Patient w/ Tmax 101, , WBC 18, 7% bands, 10% blasts, lactate 4.7 --> 3. ddx includes sepsis 2/2 UTI vs. ?asp PNA given cough and tracheal secretions w/o focal consolidation on CTA chest vs SIRS 2/2 possible malignancy   -UA w/ 11-25 WBC, trace LE, occasional bacteria  -COVID/RVP negative at [facility] negative  -CXR at [facility] revealing R pulm congestion, no edema   -s/p vanc/zosyn x 1 at  ED  -c/w zosyn for now  -f/u BCx, UCx from [facility]  -check sputum cx  -HOB >30, aspiration precautions  -speech and swallow in AM

## 2021-06-06 NOTE — CONSULT NOTE ADULT - SUBJECTIVE AND OBJECTIVE BOX
HPI:  Patient is an 84 y/o (Bulgarian-speaking) F w/ a PMHx of CAD w/ TVD (cardiac cath 2019 in [place] revealed LAD 70-80%, OM1 80%,  RCA, family declined CABG at that time), HFpEF, severe AS, HTN, recently dx T2DM (A1C >9), chronic leukocytosis (WBC 13-14 in [place] in ), anemia/thrombocytopenia, and GERD who presented as transfer from [facility] where she was seen for 5 days for substernal chest pain that radiated to left jaw, left arm, and back w/ associated progressive SANON x 5 days. She was found to be septic w/ Tmax 101, WBC 18, 7% bands, 10% blasts, lactate 4.7 --> 3, sinus tach 110 w/ PVCs w/ mildly positive UA (11-25 WBC, trace LE, occasional bacteria) and w/ elevated cardiac enzymes (TropI 0.494, CKMB 4.4, BNP 1790) w/o EKG changes c/f NSTEMI vs. demand ischemia. She was evaluated by Cardiology who recommended to hold hep gtt and DAPT due to thrombocytopenia and transfer to tertiary center for higher acuity of care. Patient was also evaluated by critical care, found to have low EF and apical hypokinesis on bedside POCUS. Per patient's son, there is no history of cancer in patient or family. Per patient's daughter, flow cytometry was checked in [facility] in , but was inconclusive. Blasts noted on CBC within the last week only. On presentation to [facility], patient was noted to be tachypneic to 30 with associated wheezing. VS: Temp 98.5,  (sinus tach on tele), 132/73, RR 30, 100% 2L NC. On ROS, patient endorsed a cough, and some abdominal and back pain. A CT C/A/P at [facility] was notable for eccentric wall thickening involving the distal esophagus. Patient was ultimately admitted to telemetry for further management. Given evidence of blasts on CBC differential, Hematology was consulted for further evaluation.      Patient's labs were reviewed: CBC on presentation showed a leukocytosis (WBC: 18.90 w/ 10% blasts), normocytic anemia (Hgb: 9.1, MCV: 83.5), and thrombocytopenia (PLT = 24k). FOBT was negative x1. DIC labs were unremarkable. She is noted to have an elevated protein gap. She also has elevated Radha.      PAST MEDICAL & SURGICAL HISTORY:  Diabetes mellitus    CAD (coronary artery disease)    Chronic diastolic congestive heart failure    Severe aortic stenosis    Hypertension    No significant past surgical history    Review of Systems:   CONSTITUTIONAL: + Fever, No chills  EYES: No eye pain or discharge  ENMT:  No sinus or throat pain  NECK: No pain or stiffness  RESPIRATORY: + Shortness of breath, + Cough  CARDIOVASCULAR: + Chest pain, No palpitations  GASTROINTESTINAL: + Mild abdominal pain, + Nausea  GENITOURINARY: No dysuria or hematuria  NEUROLOGICAL: No headaches or syncope  SKIN: No itching or rash  MUSCULOSKELETAL: + Mild back pain, No joint swelling    Allergies    No Known Allergies    Intolerances    Social History: No smoking, EtOH, or illicit drug use    FAMILY HISTORY:  No pertinent family history in first degree relatives    MEDICATIONS  (STANDING):  atorvastatin 80 milliGRAM(s) Oral at bedtime  dextrose 40% Gel 15 Gram(s) Oral once  dextrose 50% Injectable 25 Gram(s) IV Push once  dextrose 50% Injectable 12.5 Gram(s) IV Push once  dextrose 50% Injectable 25 Gram(s) IV Push once  famotidine    Tablet 40 milliGRAM(s) Oral daily  glucagon  Injectable 1 milliGRAM(s) IntraMuscular once  insulin glargine Injectable (LANTUS) 14 Unit(s) SubCutaneous at bedtime  insulin lispro (ADMELOG) corrective regimen sliding scale   SubCutaneous three times a day before meals  insulin lispro (ADMELOG) corrective regimen sliding scale   SubCutaneous at bedtime  insulin lispro Injectable (ADMELOG) 4 Unit(s) SubCutaneous three times a day before meals  metoprolol tartrate 25 milliGRAM(s) Oral two times a day  piperacillin/tazobactam IVPB.. 3.375 Gram(s) IV Intermittent every 8 hours    MEDICATIONS  (PRN):  acetaminophen   Tablet .. 650 milliGRAM(s) Oral every 6 hours PRN Mild Pain (1 - 3), Moderate Pain (4 - 6)  metoprolol tartrate Injectable 5 milliGRAM(s) IV Push every 6 hours PRN HR > 110      CAPILLARY BLOOD GLUCOSE      POCT Blood Glucose.: 410 mg/dL (2021 07:57)  POCT Blood Glucose.: 250 mg/dL (2021 23:13)    I&O's Summary    2021 07:01  -  2021 07:00  --------------------------------------------------------  IN: 120 mL / OUT: 650 mL / NET: -530 mL    Vital Signs Last 24 Hrs  T(C): 37.8 (2021 08:09), Max: 38.3 (2021 16:13)  T(F): 100.1 (2021 08:09), Max: 101 (2021 16:13)  HR: 99 (2021 08:09) (74 - 126)  BP: 87/57 (2021 08:09) (87/57 - 137/60)  BP(mean): 78 (2021 21:29) (78 - 82)  RR: 24 (2021 08:09) (16 - 32)  SpO2: 100% (2021 08:09) (96% - 100%)    PHYSICAL EXAM:  GENERAL: NAD  HEENT: NC/AT, MMM  NECK: Supple  CHEST/LUNG: Respirations grossly nonlabored  HEART: RRR; +S1/S2  ABDOMEN: +BS, Soft, NT  EXTREMITIES: No LE edema  NEUROLOGY: Awake and alert, Answering questions and following commands appropriately  SKIN: Warm and dry  PSYCH: Calm and cooperative    LABS:                        7.8    17.68 )-----------( 23       ( 2021 07:26 )             23.5     06-06    131<L>  |  99  |  14  ----------------------------<  225<H>  4.0   |  19<L>  |  0.64    Ca    8.5      2021 00:38  Phos  3.2     06-06  Mg     2.3     06-06    TPro  8.0  /  Alb  3.2<L>  /  TBili  1.1  /  DBili  x   /  AST  45<H>  /  ALT  22  /  AlkPhos  108  06-06    PT/INR - ( 2021 00:38 )   PT: 13.9 sec;   INR: 1.17 ratio         PTT - ( 2021 00:38 )  PTT:28.4 sec  CARDIAC MARKERS ( 2021 07:23 )  x     / x     / 212 U/L / x     / 18.2 ng/mL  CARDIAC MARKERS ( 2021 00:38 )  x     / x     / 261 U/L / x     / 30.9 ng/mL  CARDIAC MARKERS ( 2021 16:47 )  .494 ng/mL / x     / x     / x     / 4.4 ng/mL      Urinalysis Basic - ( 2021 18:12 )    Color: Pale Yellow / Appearance: Clear / S.015 / pH: x  Gluc: x / Ketone: Small  / Bili: Negative / Urobili: Negative   Blood: x / Protein: 30 mg/dL / Nitrite: Negative   Leuk Esterase: Trace / RBC: 0-2 /HPF / WBC 11-25   Sq Epi: x / Non Sq Epi: Few / Bacteria: Occasional    RADIOLOGY & ADDITIONAL TESTS:  Studies reviewed.   HPI:  Patient is an 84 y/o (Italian-speaking) F w/ a PMHx of CAD w/ TVD (cardiac cath 2019 in [place] revealed LAD 70-80%, OM1 80%,  RCA, family declined CABG at that time), HFpEF, severe AS, HTN, recently dx T2DM (A1C >9), chronic leukocytosis (WBC 13-14 in [place] in ), anemia/thrombocytopenia, and GERD who presented as transfer from [facility] where she was seen for 5 days for substernal chest pain that radiated to left jaw, left arm, and back w/ associated progressive SANON x 5 days. She was found to be septic w/ Tmax 101, WBC 18, 7% bands, 10% blasts, lactate 4.7 --> 3, sinus tach 110 w/ PVCs w/ mildly positive UA (11-25 WBC, trace LE, occasional bacteria) and w/ elevated cardiac enzymes (TropI 0.494, CKMB 4.4, BNP 1790) w/o EKG changes c/f NSTEMI vs. demand ischemia. She was evaluated by Cardiology who recommended to hold hep gtt and DAPT due to thrombocytopenia and transfer to tertiary center for higher acuity of care. Patient was also evaluated by critical care, found to have low EF and apical hypokinesis on bedside POCUS. Per patient's son, there is no history of cancer in patient or family. Per patient's daughter, flow cytometry was checked in [facility] in , but was inconclusive. Blasts noted on CBC within the last week only. On presentation to [facility], patient was noted to be tachypneic to 30 with associated wheezing. VS: Temp 98.5,  (sinus tach on tele), 132/73, RR 30, 100% 2L NC. On ROS, patient endorsed a cough, and some abdominal and back pain. A CT C/A/P at [facility] was notable for eccentric wall thickening involving the distal esophagus. Patient was ultimately admitted to telemetry for further management. Given evidence of blasts on CBC differential, Hematology was consulted for further evaluation.      Patient's labs were reviewed: CBC on presentation showed a leukocytosis (WBC: 18.90 w/ 10% blasts), normocytic anemia (Hgb: 9.1, MCV: 83.5), and thrombocytopenia (PLT = 24k). Iron studies are suggestive of AoCD. FOBT was negative x1. Available DIC and TLS labs appear unremarkable. She also has elevated Radha.    Patient seen this AM. Her daughter was at bedside at time of visit. Above history was confirmed. On ROS, patient reports recent fatigue, poor PO intake, cough, worsening dyspnea, and occasional chest pain. Per patient's daughter, patient has visited different Hematologists in the past (1 in [place], 1 in [place] - Unsure which one) for further evaluation of leukocytosis. Patient reportedly had a BMBx in ~ 2019 which was reportedly normal. Over the past year, patient has had a mild anemia and a fluctuating mild thrombocytopenia. A peripheral blood flow cytometry was reportedly sent recent which was nondiagnostic. Patient's anemia and thrombocytopenia has been noted to be worsening over recent weeks.    PAST MEDICAL & SURGICAL HISTORY:  Diabetes mellitus    CAD (coronary artery disease)    Chronic diastolic congestive heart failure    Severe aortic stenosis    Hypertension    No significant past surgical history    Review of Systems:   CONSTITUTIONAL: + Fever, + Fatigue  EYES: No eye pain or discharge  ENMT:  No sinus or throat pain  NECK: No pain or stiffness  RESPIRATORY: + Shortness of breath, + Cough  CARDIOVASCULAR: + Chest pain, No palpitations  GASTROINTESTINAL: + Poor PO intake, + Nausea  GENITOURINARY: No dysuria or hematuria  NEUROLOGICAL: No headaches or syncope  SKIN: No itching or rash  MUSCULOSKELETAL: No joint pain or joint swelling    Allergies    No Known Allergies    Intolerances    Social History: No smoking, EtOH, or illicit drug use    FAMILY HISTORY:  No pertinent family history in first degree relatives    MEDICATIONS  (STANDING):  atorvastatin 80 milliGRAM(s) Oral at bedtime  dextrose 40% Gel 15 Gram(s) Oral once  dextrose 50% Injectable 25 Gram(s) IV Push once  dextrose 50% Injectable 12.5 Gram(s) IV Push once  dextrose 50% Injectable 25 Gram(s) IV Push once  famotidine    Tablet 40 milliGRAM(s) Oral daily  glucagon  Injectable 1 milliGRAM(s) IntraMuscular once  insulin glargine Injectable (LANTUS) 14 Unit(s) SubCutaneous at bedtime  insulin lispro (ADMELOG) corrective regimen sliding scale   SubCutaneous three times a day before meals  insulin lispro (ADMELOG) corrective regimen sliding scale   SubCutaneous at bedtime  insulin lispro Injectable (ADMELOG) 4 Unit(s) SubCutaneous three times a day before meals  metoprolol tartrate 25 milliGRAM(s) Oral two times a day  piperacillin/tazobactam IVPB.. 3.375 Gram(s) IV Intermittent every 8 hours    MEDICATIONS  (PRN):  acetaminophen   Tablet .. 650 milliGRAM(s) Oral every 6 hours PRN Mild Pain (1 - 3), Moderate Pain (4 - 6)  metoprolol tartrate Injectable 5 milliGRAM(s) IV Push every 6 hours PRN HR > 110      CAPILLARY BLOOD GLUCOSE      POCT Blood Glucose.: 410 mg/dL (2021 07:57)  POCT Blood Glucose.: 250 mg/dL (2021 23:13)    I&O's Summary    2021 07:01  -  2021 07:00  --------------------------------------------------------  IN: 120 mL / OUT: 650 mL / NET: -530 mL    Vital Signs Last 24 Hrs  T(C): 37.8 (2021 08:09), Max: 38.3 (2021 16:13)  T(F): 100.1 (2021 08:09), Max: 101 (2021 16:13)  HR: 99 (2021 08:09) (74 - 126)  BP: 87/57 (2021 08:09) (87/57 - 137/60)  BP(mean): 78 (2021 21:29) (78 - 82)  RR: 24 (2021 08:09) (16 - 32)  SpO2: 100% (2021 08:09) (96% - 100%)    PHYSICAL EXAM:  GENERAL: NAD, Ill-appearing  HEENT: NC/AT, Sclera anicteric  NECK: Supple  CHEST/LUNG: Respirations grossly nonlabored on nasal cannula  HEART: Mild tachycardia, +S1/S2  ABDOMEN: +BS, Soft, NT  EXTREMITIES: No LE edema  NEUROLOGY: Awake and alert, Answering questions and following commands  SKIN: Warm and dry  PSYCH: Calm    LABS:                        7.8    17.68 )-----------( 23       ( 2021 07:26 )             23.5     06-06    131<L>  |  99  |  14  ----------------------------<  225<H>  4.0   |  19<L>  |  0.64    Ca    8.5      2021 00:38  Phos  3.2     06-  Mg     2.3     06-    TPro  8.0  /  Alb  3.2<L>  /  TBili  1.1  /  DBili  x   /  AST  45<H>  /  ALT  22  /  AlkPhos  108  06-    PT/INR - ( 2021 00:38 )   PT: 13.9 sec;   INR: 1.17 ratio         PTT - ( 2021 00:38 )  PTT:28.4 sec  CARDIAC MARKERS ( 2021 07:23 )  x     / x     / 212 U/L / x     / 18.2 ng/mL  CARDIAC MARKERS ( 2021 00:38 )  x     / x     / 261 U/L / x     / 30.9 ng/mL  CARDIAC MARKERS ( 2021 16:47 )  .494 ng/mL / x     / x     / x     / 4.4 ng/mL      Urinalysis Basic - ( 2021 18:12 )    Color: Pale Yellow / Appearance: Clear / S.015 / pH: x  Gluc: x / Ketone: Small  / Bili: Negative / Urobili: Negative   Blood: x / Protein: 30 mg/dL / Nitrite: Negative   Leuk Esterase: Trace / RBC: 0-2 /HPF / WBC 11-25   Sq Epi: x / Non Sq Epi: Few / Bacteria: Occasional    RADIOLOGY & ADDITIONAL TESTS:  Studies reviewed.    Peripheral smear reviewed: Normochromic RBCs, Rare schistocyte, True thrombocytopenia with no platelet clumping, Few large platelet seen, Multiple blast-like cells w/ high N:C ratio seen

## 2021-06-06 NOTE — CONSULT NOTE ADULT - SUBJECTIVE AND OBJECTIVE BOX
Surgeon: Dr. Ndiaye     Requesting Physician: Dr. Abdi    HISTORY OF PRESENT ILLNESS:  84 y/o Italian speaking F (pt's daughter at bedside served as  per pt's request) w/ a PMHx of CAD w/ TVD (cardiac cath 2019 in [facility] revealed LAD 70-80%, OM1 80%,  RCA, family declined CABG at that time), HFpEF, severe AS, HTN, recently dx T2DM (A1C >9), chronic leukocytosis (WBC 13-14 in [place] in ), anemia/thrombocytopenia, and GERD who initially presented to [facility] as a transfer from [facility] on  where she was seen for substernal chest pain that radiated to left jaw, left arm, and back a/w progressive SANON x 5 days. In  ED pt was found to be septic w/ Tmax 101F, WBC 18, 7% bands, 10% blasts, lactate 4.7 --> 3, sinus tach 110 w/ PVCs w/ mildly positive UA (11-25 WBC, trace LE, occasional bacteria) and w/ elevated cardiac enzymes (TropI 0.494, CKMB 4.4, BNP 1790) w/o EKG changes c/f NSTEMI vs. demand ischemia. She was evaluated by Cardiology who recommended to hold hep gtt and DAPT due to thrombocytopenia and transfer to tertiary center for higher acuity of care. Patient was also evaluated by critical care, found to have low EF and apical hypokinesis on bedside POCUS. Per patient's son, there is no history of cancer in patient or family. Per patient's daughter, flow cytometry was checked in [facility] in , but was inconclusive. Blasts noted on CBC within the last week only. On presentation to [facility], patient was noted to be tachypneic with VS: Temp 98.5,  (sinus tach on tele), 132/73, RR 30, 100% 2L NC. CT C/A/P at [facility] was notable for eccentric wall thickening involving the distal esophagus, but negative for aortic dissection. Patient was ultimately admitted to telemetry for further management. Heme/onc consulted given blasts on CBC w/ diff. CT surgery consulted for evaluation given pt's h/o CAD and severe AS.       PAST MEDICAL & SURGICAL HISTORY:  Diabetes mellitus  CAD (coronary artery disease)  Chronic diastolic congestive heart failure  Severe aortic stenosis  Hypertension  No significant past surgical history      MEDICATIONS (STANDING):  acetaminophen IVPB 1000 milliGRAM(s) IV Intermittent once  artificial tears (preservative free) Ophthalmic Solution 1 Drop(s) Both EYES two times a day  atorvastatin 80 milliGRAM(s) Oral at bedtime  famotidine Tablet 40 milliGRAM(s) Oral daily  glucagon  Injectable 1 milliGRAM(s) IntraMuscular once  insulin glargine Injectable (LANTUS) 14 Unit(s) SubCutaneous at bedtime  insulin lispro (ADMELOG) corrective regimen sliding scale SubCutaneous three times a day before meals  insulin lispro (ADMELOG) corrective regimen sliding scale SubCutaneous at bedtime  insulin lispro Injectable (ADMELOG) 4 Unit(s) SubCutaneous three times a day before meals  metoprolol tartrate 25 milliGRAM(s) Oral two times a day  pantoprazole Tablet 40 milliGRAM(s) Oral before breakfast  piperacillin/tazobactam IVPB.. 3.375 Gram(s) IV Intermittent every 8 hours    MEDICATIONS  (PRN):  acetaminophen Tablet 650 milliGRAM(s) Oral every 6 hours PRN Mild Pain (1 - 3), Moderate Pain (4 - 6)  metoprolol tartrate Injectable 5 milliGRAM(s) IV Push every 6 hours PRN HR > 110      Allergies: No Known Allergies      SOCIAL HISTORY:  Smoker: Denies  ETOH use: Denies  Illicit Drug use: Denies  Lives with: Son    FAMILY HISTORY:  No pertinent family history in first degree relatives      Review of Systems:  CONSTITUTIONAL: (+) fever, Denies chills, sweats, fatigue, weight loss, weight gain                                       NEURO:  Denies paresthesias, seizures, syncope, confusion                                                                                  EYES:  Denies blurry vision, discharge, pain, loss of vision                                                                                    ENMT:  Denies difficulty hearing, vertigo, dysphagia, epistaxis, recent dental work                                       CV: (+) chest pain, Denies palpitations and orthopnea                                                                                           RESPIRATORY: (+) SANON, (+) cough, (+) hemoptysis, Denies wheezing                                                 GI: (+) abd pain, Denies nausea, vomiting, diarrhea, constipation, melena                                                                          : Denies hematuria, dysuria, urgency, incontinence                                                                                          MUSKULOSKELETAL: (+) back pain, Denies arthritis, joint swelling, muscle weakness                                                             SKIN/BREAST:  Denies rash, itching, hair loss, masses                                                                                              PSYCH:  Denies depression, anxiety, suicidal ideation                                                                                                HEME/LYMPH:  Denies bruises easily, enlarged lymph nodes, tender lymph nodes                                          ENDOCRINE:  Denies cold intolerance, heat intolerance, polydipsia                                                                      Vital Signs Last 24 Hrs  T(C): 37.8 (2021 08:09), Max: 38.3 (2021 16:13)  T(F): 100.1 (2021 08:09), Max: 101 (2021 16:13)  HR: 98 (2021 10:42) (74 - 126)  BP: 107/72 (2021 09:57) (87/57 - 137/60)  BP(mean): 78 (2021 21:29) (78 - 82)  RR: 24 (2021 09:57) (16 - 32)  SpO2: 98% (2021 10:42) (96% - 100%)    Physical Exam   General: NAD, ill appearing  HEENT:  NC/AT  Neuro: A&Ox2 (per daughter pt does not know date at baseline), answering questions, speech clear, no focal deficits noted  Respiratory: mildly tachypneic on nasal cannula, coarse breath sounds b/l   Cardiovascular: RRR, (+) S1/S2, no murmur appreciated  GI: Abd soft, NT/ND, (+) [id number]   Peripheral Vascular:  no LE edema b/l, 2+ peripheral pulses b/l, no varicosities/PVD noted  Musculoskeletal: B/L UE and LE 5/5 strength   Psychiatric: Normal mood, normal affect observed  Skin: Normal exam to inspection and palpation.                                                           LABS:                        7.8    17.68 )-----------( 23       ( 2021 07:26 )             23.5     06-06    131<L>  |  99  |  14  ----------------------------<  225<H>  4.0   |  19<L>  |  0.64    Ca    8.5      2021 00:38  Phos  3.2     06-06  Mg     2.3     06-06    TPro  8.0  /  Alb  3.2<L>  /  TBili  1.1  /  DBili  x   /  AST  45<H>  /  ALT  22  /  AlkPhos  108  06-06    PT/INR - ( 2021 00:38 )   PT: 13.9 sec;   INR: 1.17 ratio         PTT - ( 2021 00:38 )  PTT:28.4 sec  Urinalysis Basic - ( 2021 18:12 )    Color: Pale Yellow / Appearance: Clear / S.015 / pH: x  Gluc: x / Ketone: Small  / Bili: Negative / Urobili: Negative   Blood: x / Protein: 30 mg/dL / Nitrite: Negative   Leuk Esterase: Trace / RBC: 0-2 /HPF / WBC 11-25   Sq Epi: x / Non Sq Epi: Few / Bacteria: Occasional      CARDIAC MARKERS ( 2021 07:23 )  x     / x     / 212 U/L / x     / 18.2 ng/mL  CARDIAC MARKERS ( 2021 00:38 )  x     / x     / 261 U/L / x     / 30.9 ng/mL  CARDIAC MARKERS ( 2021 16:47 )  .494 ng/mL / x     / x     / x     / 4.4 ng/mL      RADIOLOGY & ADDITIONAL STUDIES:    CT Angio Chest PE Protocol w/ IV Cont (21 @ 19:07): IMPRESSION: No aortic dissection. MEDIASTINUM AND RAMANA: Suspicion for eccentric wall thickening involving the distal esophagus.

## 2021-06-06 NOTE — PROGRESS NOTE ADULT - PROBLEM SELECTOR PLAN 1
Patient w/ hx of TVD, declined CABG in past, now p/w 5 days of cp, found to have elevated CE (CKMB 4.4, Trop I 0.494) w/o EKG changes   -EKG at [facility] showing sinus tach 110 w/ LBBB, TWI I, AVL (nonspecific iso LBBB)   - cardiology recs appreciated, CE likely elevated iso demand from sepsis, recommended holding DAPT and hep gtt iso  -per critical care note at [facility], EF appears low w/ basal hypokinesis  -will hold DAPT and hep gtt for now   -cardiology recs appreciated  -start atorvastatin 80 mg  -c/w home metoprolol 25 BID   -trend CE q8 hrs   -check TSH, lipids, A1C   -check TTE Patient w/ hx of TVD, declined CABG in past, now p/w 5 days of cp, found to have elevated CE (CKMB 4.4, Trop I 0.494) w/o EKG changes   -EKG at [facility] showing sinus tach 110 w/ LBBB, TWI I, AVL (nonspecific iso LBBB)   - cardiology recs appreciated, CE likely elevated iso demand from sepsis, recommended holding DAPT and hep gtt iso thrombocytopenia  -per critical care note at [facility], EF appears low w/ basal hypokinesis on POCUS  -hold DAPT and hep gtt for now   -cardiology recs appreciated  -start atorvastatin 80 mg  -c/w home metoprolol 25 BID, Lopressor 5 mg IVP q6h for HR>110  -trend CE q8 hrs  -TSH WNL, lipid panel notable for low HDL, A1C 9.7%  -check repeat TTE Patient w/ hx of TVD, declined CABG in past, now p/w 5 days of cp, found to have elevated CE (CKMB 4.4, Trop I 0.494) w/o EKG changes   -EKG at [facility] showing sinus tach 110 w/ LBBB, TWI I, AVL (nonspecific iso LBBB)   - cardiology recs appreciated, CE likely elevated iso demand from sepsis, recommended holding DAPT and hep gtt iso thrombocytopenia  -per critical care note at [facility], EF appeared low w/ basal hypokinesis on POCUS  -hold DAPT and hep gtt for now   -cardiology recs appreciated  -start atorvastatin 80 mg  -c/w home metoprolol 25 BID, Lopressor 5 mg IVP q6h for HR>110  -trend CE q8 hrs  -TSH WNL, lipid panel notable for low HDL, A1C 9.7%  -check repeat TTE

## 2021-06-06 NOTE — SWALLOW BEDSIDE ASSESSMENT ADULT - SLP GENERAL OBSERVATIONS
Encountered in bed, awake/alert with daughter present. +NC 3.5L. Mild WOB at baseline. Denied pain with deglutition. Reported pain with mastication 2/2 dental caries.

## 2021-06-06 NOTE — H&P ADULT - PROBLEM SELECTOR PLAN 5
Normocytic Normocytic anemia (bl 11-12), thrombocytopenia (bl plt 75, recent downtrend to 35 as outpatient)  -keep active T&S  -heme onc consult in AM  -transfuse Hb <7, transfuse plt <15 if febrile Patient w/ baseline leukocytosis 13-14 since 2019 (in [place]), w/ blasts seen on outpatient w/u c/f possible leukemia vs. MDS   -10% blasts noted during this admission, elevated protein gap as noted   -CTA C/A/P revealing eccentric thickening of distal eso, further w/o to r/o malignancy recommended  -heme/onc consult in AM  -eventual GI consult Patient w/ baseline leukocytosis 13-14 since 2019 (in [place]), w/ blasts seen on outpatient w/u c/f possible leukemia vs. MDS   -10% blasts noted during this admission, elevated protein gap as noted   -CTA C/A/P revealing eccentric thickening of distal eso, further w/o to r/o malignancy recommended  -heme/onc consult  -needs eventual GI consult

## 2021-06-06 NOTE — PROGRESS NOTE ADULT - PROBLEM SELECTOR PROBLEM 6
Berenice Street is a 4 Mos infant here for health maintenance exam with guardian, maternal grandmother.    Concerns:  None  Feeding: Cow milk formula well  Development:  Normal [place] screen  Sleep:  No concerns    History reviewed. No pertinent past medical history.    Physical Exam:    Visit Vitals  Pulse 128   Temp 98.9 °F (37.2 °C) (Temporal Artery)   Ht 24.5\" (62.2 cm)   Wt 6.166 kg   HC 40.5 cm (15.95\")   BMI 15.92 kg/m²     General:  Alert, vigorous, nondysmorphic, well-appearing infant.  HEENT: Head: Normocephalic. Anterior fontanelle open, soft, flat. Skull is normally formed. Eyes: Red reflexes normal bilaterally. No eye injection or discharge. Gaze conjugate. Ears: External ears and ear canals unremarkable. Tympanic membranes clear and mobile. Nose: No rhinorrhea. Throat: Oropharynx benign without lesions. Mucous membranes moist.   Neck: Supple, no  masses or lymphadenopathy, full range of motion.  Chest: Normal chest wall, lungs are clear to auscultation bilaterally, respiratory effort is normal .  Cardiac: Heart sounds regular rate and rhythm, normal S1 and S2 with no murmurs rubs or gallops. Normal extremity pulses and distal perfusion in upper and lower extremities.  Abdomen: Soft, nondistended, nontender. No masses, no hepatosplenomegaly.  Genitalia: Normal Gregory 1 female     Extremities: No deformities.Hips have full range of motion, with negative Ortolani and Trinh maneuvers. Leg length is symmetric. Upper extremities benign.  Spine: Straight.  Neurologic: Infant has normal muscle tone in upper and lower extremities, able to hold head up in prone position, minimal head lag.  Skin: No rashes or lesions.    ASSESSMENT:    1. Healthy 4 Mos infant, normal growth and development.  2.  Reviewed initiation of rice cereal and further advancement of the diet.    PLAN:    1. Bright Futures handout is reviewed with parents.  2. Immunizations: Pediarix, Hib, Prevnar, and rotavirus vaccines given. Parent(s)  counseled on the recommended vaccines and components, including risks and benefits of the immunizations, as well as risks of not receiving them. Vaccine information statement (VIS) literature also provided.  3. Next health maintenance exam at 6 months of age.     Bicytopenia

## 2021-06-06 NOTE — PROGRESS NOTE ADULT - PROBLEM SELECTOR PLAN 5
Patient w/ baseline leukocytosis 13-14 since 2019 (in [place]), w/ blasts seen on outpatient w/u c/f possible leukemia vs. MDS   -10% blasts noted during this admission, elevated protein gap as noted   -CTA C/A/P revealing eccentric thickening of distal eso, further w/o to r/o malignancy recommended  -heme/onc consult  -needs eventual GI consult Patient w/ baseline leukocytosis 13-14 since 2019 (in [place]), w/ blasts seen on outpatient w/u c/f possible leukemia vs. MDS   -10% blasts noted during this admission, elevated protein gap as noted   -CTA C/A/P revealing eccentric thickening of distal eso, further w/o to r/o malignancy recommended  -heme/onc consult done  -needs GI consult

## 2021-06-06 NOTE — PROGRESS NOTE ADULT - PROBLEM SELECTOR PLAN 4
Patient tachypneic on presentation to [facility], requiring 2L NC. No hypercapnia on VBG   -likely aspiration PNA given tracheal secretions on CTA w/o focal consolidation, less likely PE as CTA chest neg. ddx also includes fluid overload iso AS and 1L NS  -bipap for inc wob Patient tachypneic on presentation to [facility], requiring 2L NC. No hypercapnia on VBG   -likely aspiration PNA given tracheal secretions on CTA w/o focal consolidation, less likely PE as CTA chest neg. ddx also includes fluid overload iso AS and 1L NS  -bipap for inc wob  -improved resp status

## 2021-06-06 NOTE — PROGRESS NOTE ADULT - SUBJECTIVE AND OBJECTIVE BOX
PROGRESS NOTE:   Pricilla Galvez MD   Internal Medicine PGY-1  [phone]    Patient is a 85y old  Female who presents with a chief complaint of chest pain (2021 06:48)    SUBJECTIVE / OVERNIGHT EVENTS: Overnight admitted to medical service, sinus tachycardia 100-130 on telemetry. Patient seen in AM at bedside on BIPAP, sinus 90s at the time. Daughter (physician) present at bedside served as . Per daughter, the patient had been complaining of abdominal and back pain overnight and as a result did not sleep well. In AM, she endorsed continued abdominal and back pain and fatigue. Denied chest pain, shortness of breath, nausea/vomiting.    Later in AM, hypotensive 80s/50s. Patient seen again at the time, now on nasal cannula, stable, without new complaints.    MEDICATIONS  (STANDING):  albuterol/ipratropium for Nebulization. 3 milliLiter(s) Nebulizer once  atorvastatin 80 milliGRAM(s) Oral at bedtime  dextrose 40% Gel 15 Gram(s) Oral once  dextrose 50% Injectable 25 Gram(s) IV Push once  dextrose 50% Injectable 12.5 Gram(s) IV Push once  dextrose 50% Injectable 25 Gram(s) IV Push once  famotidine    Tablet 40 milliGRAM(s) Oral daily  glucagon  Injectable 1 milliGRAM(s) IntraMuscular once  insulin glargine Injectable (LANTUS) 14 Unit(s) SubCutaneous at bedtime  insulin lispro (ADMELOG) corrective regimen sliding scale   SubCutaneous three times a day before meals  insulin lispro (ADMELOG) corrective regimen sliding scale   SubCutaneous at bedtime  insulin lispro Injectable (ADMELOG) 4 Unit(s) SubCutaneous three times a day before meals  metoprolol tartrate 25 milliGRAM(s) Oral two times a day  piperacillin/tazobactam IVPB.. 3.375 Gram(s) IV Intermittent every 8 hours    MEDICATIONS  (PRN):  acetaminophen   Tablet .. 650 milliGRAM(s) Oral every 6 hours PRN Mild Pain (1 - 3), Moderate Pain (4 - 6)  metoprolol tartrate Injectable 5 milliGRAM(s) IV Push every 6 hours PRN HR > 110    CAPILLARY BLOOD GLUCOSE  POCT Blood Glucose.: 410 mg/dL (2021 07:57)  POCT Blood Glucose.: 250 mg/dL (2021 23:13)    I&O's Summary    2021 07:01  -  2021 07:00  --------------------------------------------------------  IN: 120 mL / OUT: 650 mL / NET: -530 mL    PHYSICAL EXAM:  Vital Signs Last 24 Hrs  T(C): 37.8 (2021 08:09), Max: 38.3 (2021 16:13)  T(F): 100.1 (2021 08:09), Max: 101 (2021 16:13)  HR: 99 (2021 08:09) (74 - 126)  BP: 87/57 (2021 08:09) (87/57 - 137/60)  BP(mean): 78 (2021 21:29) (78 - 82)  RR: 24 (2021 08:09) (16 - 32)  SpO2: 100% (2021 08:09) (96% - 100%)    GENERAL: Elderly female, ill appearing, NAD  SKIN: No rashes.  HEENT: NCAT, PERRL, MMM  LUNGS: Mildly tachypneic on nasal cannula. Coarse breath sounds b/l.  CV: RRR, +S1S2  ABDOMEN: Obese abdomen. +BS, soft NTND  EXTREMITIES: no peripheral edema, distal pulses intact  NEURO: A&Ox2 person, hospital (per daughter, does not know date at baseline). Answering questions, following commands. MAEx4, no gross focal neurologic deficits.  PSYCH: normal mood and affect    LABS:               7.8    17.68 )-----------( 23       ( 2021 07:26 )             23.5     06-06    131<L>  |  99  |  14  ----------------------------<  225<H>  4.0   |  19<L>  |  0.64    Ca    8.5      2021 00:38  Phos  3.2     06-06  Mg     2.3     06-06    TPro  8.0  /  Alb  3.2<L>  /  TBili  1.1  /  DBili  x   /  AST  45<H>  /  ALT  22  /  AlkPhos  108  06-06    PT/INR - ( 2021 00:38 )   PT: 13.9 sec;   INR: 1.17 ratio    PTT - ( 2021 00:38 )  PTT:28.4 sec    CARDIAC MARKERS ( 2021 07:23 )  x     / x     / 212 U/L / x     / 18.2 ng/mL  CARDIAC MARKERS ( 2021 00:38 )  x     / x     / 261 U/L / x     / 30.9 ng/mL  CARDIAC MARKERS ( 2021 16:47 )  .494 ng/mL / x     / x     / x     / 4.4 ng/mL    Urinalysis Basic - ( 2021 18:12 )  Color: Pale Yellow / Appearance: Clear / S.015 / pH: x  Gluc: x / Ketone: Small  / Bili: Negative / Urobili: Negative   Blood: x / Protein: 30 mg/dL / Nitrite: Negative   Leuk Esterase: Trace / RBC: 0-2 /HPF / WBC 11-25   Sq Epi: x / Non Sq Epi: Few / Bacteria: Occasional    RADIOLOGY & ADDITIONAL TESTS:  Results Reviewed:   Imaging Personally Reviewed:   Electrocardiogram Personally Reviewed:     COORDINATION OF CARE:  Care Discussed with Consultants/Other Providers [Y/N]:   Prior or Outpatient Records Reviewed [Y/N]:    PROGRESS NOTE:   Pricilla Galvez MD   Internal Medicine PGY-1  [phone]    Patient is a 85y old  Female who presents with a chief complaint of chest pain (2021 06:48)    SUBJECTIVE / OVERNIGHT EVENTS: Overnight admitted to medical service, sinus tachycardia 100-130 on telemetry. Patient seen in AM at bedside on BIPAP, sinus 90s at the time. Daughter (physician) present at bedside served as . Per daughter, the patient had been complaining of abdominal and back pain overnight and as a result did not sleep well. In AM, she endorsed continued abdominal and back pain and fatigue. Denied chest pain, shortness of breath, nausea/vomiting.    Later in AM, hypotensive 80s/50s. Patient seen again at the time, now on nasal cannula, stable, without new complaints. BP improved on its own.    MEDICATIONS  (STANDING):  albuterol/ipratropium for Nebulization. 3 milliLiter(s) Nebulizer once  atorvastatin 80 milliGRAM(s) Oral at bedtime  dextrose 40% Gel 15 Gram(s) Oral once  dextrose 50% Injectable 25 Gram(s) IV Push once  dextrose 50% Injectable 12.5 Gram(s) IV Push once  dextrose 50% Injectable 25 Gram(s) IV Push once  famotidine    Tablet 40 milliGRAM(s) Oral daily  glucagon  Injectable 1 milliGRAM(s) IntraMuscular once  insulin glargine Injectable (LANTUS) 14 Unit(s) SubCutaneous at bedtime  insulin lispro (ADMELOG) corrective regimen sliding scale   SubCutaneous three times a day before meals  insulin lispro (ADMELOG) corrective regimen sliding scale   SubCutaneous at bedtime  insulin lispro Injectable (ADMELOG) 4 Unit(s) SubCutaneous three times a day before meals  metoprolol tartrate 25 milliGRAM(s) Oral two times a day  piperacillin/tazobactam IVPB.. 3.375 Gram(s) IV Intermittent every 8 hours    MEDICATIONS  (PRN):  acetaminophen   Tablet .. 650 milliGRAM(s) Oral every 6 hours PRN Mild Pain (1 - 3), Moderate Pain (4 - 6)  metoprolol tartrate Injectable 5 milliGRAM(s) IV Push every 6 hours PRN HR > 110    CAPILLARY BLOOD GLUCOSE  POCT Blood Glucose.: 410 mg/dL (2021 07:57)  POCT Blood Glucose.: 250 mg/dL (2021 23:13)    I&O's Summary    2021 07:01  -  2021 07:00  --------------------------------------------------------  IN: 120 mL / OUT: 650 mL / NET: -530 mL    PHYSICAL EXAM:  Vital Signs Last 24 Hrs  T(C): 37.8 (2021 08:09), Max: 38.3 (2021 16:13)  T(F): 100.1 (2021 08:09), Max: 101 (2021 16:13)  HR: 99 (2021 08:09) (74 - 126)  BP: 87/57 (2021 08:09) (87/57 - 137/60)  BP(mean): 78 (2021 21:29) (78 - 82)  RR: 24 (2021 08:09) (16 - 32)  SpO2: 100% (2021 08:09) (96% - 100%)    GENERAL: Elderly female, ill appearing, NAD  SKIN: No rashes.  HEENT: NCAT, PERRL, MMM, poor dentition  LUNGS: Mildly tachypneic on nasal cannula. Coarse breath sounds b/l.  CV: RRR, +S1S2  ABDOMEN: Obese abdomen. +BS, soft NTND  EXTREMITIES: no peripheral edema, distal pulses intact  NEURO: A&Ox2 person, hospital (per daughter, does not know date at baseline). Answering questions, following commands. MAEx4, no gross focal neurologic deficits.  PSYCH: normal mood and affect    LABS:               7.8    17.68 )-----------( 23       ( 2021 07:26 )             23.5     06-06    131<L>  |  99  |  14  ----------------------------<  225<H>  4.0   |  19<L>  |  0.64    Ca    8.5      2021 00:38  Phos  3.2     06-06  Mg     2.3     06-06    TPro  8.0  /  Alb  3.2<L>  /  TBili  1.1  /  DBili  x   /  AST  45<H>  /  ALT  22  /  AlkPhos  108  -    PT/INR - ( 2021 00:38 )   PT: 13.9 sec;   INR: 1.17 ratio    PTT - ( 2021 00:38 )  PTT:28.4 sec    CARDIAC MARKERS ( 2021 07:23 )  x     / x     / 212 U/L / x     / 18.2 ng/mL  CARDIAC MARKERS ( 2021 00:38 )  x     / x     / 261 U/L / x     / 30.9 ng/mL  CARDIAC MARKERS ( 2021 16:47 )  .494 ng/mL / x     / x     / x     / 4.4 ng/mL    Urinalysis Basic - ( 2021 18:12 )  Color: Pale Yellow / Appearance: Clear / S.015 / pH: x  Gluc: x / Ketone: Small  / Bili: Negative / Urobili: Negative   Blood: x / Protein: 30 mg/dL / Nitrite: Negative   Leuk Esterase: Trace / RBC: 0-2 /HPF / WBC 11-25   Sq Epi: x / Non Sq Epi: Few / Bacteria: Occasional    RADIOLOGY & ADDITIONAL TESTS:  Results Reviewed:   Imaging Personally Reviewed:   Electrocardiogram Personally Reviewed:     COORDINATION OF CARE:  Care Discussed with Consultants/Other Providers [Y/N]:   Prior or Outpatient Records Reviewed [Y/N]:    PROGRESS NOTE:   Pricilla Galvez MD   Internal Medicine PGY-1  [phone]    Patient is a 85y old  Female who presents with a chief complaint of chest pain (2021 06:48)    SUBJECTIVE / OVERNIGHT EVENTS: Overnight admitted to medical service, sinus tachycardia 100-130 on telemetry. Patient seen in AM at bedside on BIPAP, sinus 90s at the time. Daughter (physician) present at bedside served as . Per daughter, the patient had been complaining of abdominal and back pain overnight and as a result did not sleep well. In AM, she endorsed continued abdominal and back pain and fatigue. Denied chest pain, shortness of breath, nausea/vomiting.    Later in AM, hypotensive 80s/50s. Patient seen again at the time, now on nasal cannula, stable, without new complaints. BP improved on repeat (100s/60s-70s).    MEDICATIONS  (STANDING):  albuterol/ipratropium for Nebulization. 3 milliLiter(s) Nebulizer once  atorvastatin 80 milliGRAM(s) Oral at bedtime  dextrose 40% Gel 15 Gram(s) Oral once  dextrose 50% Injectable 25 Gram(s) IV Push once  dextrose 50% Injectable 12.5 Gram(s) IV Push once  dextrose 50% Injectable 25 Gram(s) IV Push once  famotidine    Tablet 40 milliGRAM(s) Oral daily  glucagon  Injectable 1 milliGRAM(s) IntraMuscular once  insulin glargine Injectable (LANTUS) 14 Unit(s) SubCutaneous at bedtime  insulin lispro (ADMELOG) corrective regimen sliding scale   SubCutaneous three times a day before meals  insulin lispro (ADMELOG) corrective regimen sliding scale   SubCutaneous at bedtime  insulin lispro Injectable (ADMELOG) 4 Unit(s) SubCutaneous three times a day before meals  metoprolol tartrate 25 milliGRAM(s) Oral two times a day  piperacillin/tazobactam IVPB.. 3.375 Gram(s) IV Intermittent every 8 hours    MEDICATIONS  (PRN):  acetaminophen   Tablet .. 650 milliGRAM(s) Oral every 6 hours PRN Mild Pain (1 - 3), Moderate Pain (4 - 6)  metoprolol tartrate Injectable 5 milliGRAM(s) IV Push every 6 hours PRN HR > 110    CAPILLARY BLOOD GLUCOSE  POCT Blood Glucose.: 410 mg/dL (2021 07:57)  POCT Blood Glucose.: 250 mg/dL (2021 23:13)    I&O's Summary    2021 07:01  -  2021 07:00  --------------------------------------------------------  IN: 120 mL / OUT: 650 mL / NET: -530 mL    PHYSICAL EXAM:  Vital Signs Last 24 Hrs  T(C): 37.8 (2021 08:09), Max: 38.3 (2021 16:13)  T(F): 100.1 (2021 08:09), Max: 101 (2021 16:13)  HR: 99 (2021 08:09) (74 - 126)  BP: 87/57 (2021 08:09) (87/57 - 137/60)  BP(mean): 78 (2021 21:29) (78 - 82)  RR: 24 (2021 08:09) (16 - 32)  SpO2: 100% (2021 08:09) (96% - 100%)    GENERAL: Elderly female, ill appearing, NAD  SKIN: No rashes.  HEENT: NCAT, PERRL, MMM, poor dentition  LUNGS: Mildly tachypneic on nasal cannula. Coarse breath sounds b/l.  CV: RRR, +S1S2  ABDOMEN: Obese abdomen. +BS, soft NTND  EXTREMITIES: no peripheral edema, distal pulses intact  NEURO: A&Ox2 person, hospital (per daughter, does not know date at baseline). Answering questions, following commands. MAEx4, no gross focal neurologic deficits.  PSYCH: normal mood and affect    LABS:               7.8    17.68 )-----------( 23       ( 2021 07:26 )             23.5     06-    131<L>  |  99  |  14  ----------------------------<  225<H>  4.0   |  19<L>  |  0.64    Ca    8.5      2021 00:38  Phos  3.2     06-  Mg     2.3     06-06    TPro  8.0  /  Alb  3.2<L>  /  TBili  1.1  /  DBili  x   /  AST  45<H>  /  ALT  22  /  AlkPhos  108  06-06    PT/INR - ( 2021 00:38 )   PT: 13.9 sec;   INR: 1.17 ratio    PTT - ( 2021 00:38 )  PTT:28.4 sec    CARDIAC MARKERS ( 2021 07:23 )  x     / x     / 212 U/L / x     / 18.2 ng/mL  CARDIAC MARKERS ( 2021 00:38 )  x     / x     / 261 U/L / x     / 30.9 ng/mL  CARDIAC MARKERS ( 2021 16:47 )  .494 ng/mL / x     / x     / x     / 4.4 ng/mL    Urinalysis Basic - ( 2021 18:12 )  Color: Pale Yellow / Appearance: Clear / S.015 / pH: x  Gluc: x / Ketone: Small  / Bili: Negative / Urobili: Negative   Blood: x / Protein: 30 mg/dL / Nitrite: Negative   Leuk Esterase: Trace / RBC: 0-2 /HPF / WBC 11-25   Sq Epi: x / Non Sq Epi: Few / Bacteria: Occasional    RADIOLOGY & ADDITIONAL TESTS:  Results Reviewed:   Imaging Personally Reviewed:   Electrocardiogram Personally Reviewed:     COORDINATION OF CARE:  Care Discussed with Consultants/Other Providers [Y/N]:   Prior or Outpatient Records Reviewed [Y/N]:

## 2021-06-06 NOTE — PHYSICAL THERAPY INITIAL EVALUATION ADULT - PERTINENT HX OF CURRENT PROBLEM, REHAB EVAL
84 yo Y[place]ut speaking female with a PMHx of CAD w/ TVD (cardiac cath 2019 in [place] revealed LAD 70-80%, OM1 80%,  RCA, family declined CABG at that time), HFpEF, severe AS, HTN, recently dx T2DM (A1C >9), chronic leukocytosis w/ blasts (WBC 13-14 in [place] in 2020), anemia, thrombocytopenia, and GERD.

## 2021-06-06 NOTE — SWALLOW BEDSIDE ASSESSMENT ADULT - ORAL PREPARATORY PHASE
able to generate negative pressure for bolus extraction via straw however notable for immediate increase in WOB; improvement with cup and tsp presentations

## 2021-06-06 NOTE — H&P ADULT - NSHPLABSRESULTS_GEN_ALL_CORE
06-    133<L>  |  100  |  16  ----------------------------<  259<H>  4.1   |  21<L>  |  0.91    Ca    8.5      2021 16:47  Phos  2.9     06-  Mg     1.8     -    TPro  9.3<H>  /  Alb  3.1<L>  /  TBili  0.9  /  DBili  x   /  AST  42<H>  /  ALT  27  /  AlkPhos  127<H>      Magnesium, Serum: 1.8 mg/dL (21 @ 16:47)    Phosphorus Level, Serum: 2.9 mg/dL (21 @ 16:47)      PT/INR - ( 2021 16:47 )   PT: 13.8 sec;   INR: 1.19 ratio         PTT - ( 2021 16:47 )  PTT:26.9 sec              Urinalysis Basic - ( 2021 18:12 )    Color: Pale Yellow / Appearance: Clear / S.015 / pH: x  Gluc: x / Ketone: Small  / Bili: Negative / Urobili: Negative   Blood: x / Protein: 30 mg/dL / Nitrite: Negative   Leuk Esterase: Trace / RBC: 0-2 /HPF / WBC 11-25   Sq Epi: x / Non Sq Epi: Few / Bacteria: Occasional                              9.1    18.90 )-----------( 24       ( 2021 17:53 )             26.9     CAPILLARY BLOOD GLUCOSE      POCT Blood Glucose.: 250 mg/dL (2021 23:13)    The Labs were reviewed by me   The Radiology was reviewed by me    EKG tracing reviewed by me Personally reviewed imaging, labs, ekg.        133<L>  |  100  |  16  ----------------------------<  259<H>  4.1   |  21<L>  |  0.91    Ca    8.5      2021 16:47  Phos  2.9       Mg     1.8         TPro  9.3<H>  /  Alb  3.1<L>  /  TBili  0.9  /  DBili  x   /  AST  42<H>  /  ALT  27  /  AlkPhos  127<H>      Magnesium, Serum: 1.8 mg/dL (21 @ 16:47)    Phosphorus Level, Serum: 2.9 mg/dL (21 @ 16:47)      PT/INR - ( 2021 16:47 )   PT: 13.8 sec;   INR: 1.19 ratio         PTT - ( 2021 16:47 )  PTT:26.9 sec              Urinalysis Basic - ( 2021 18:12 )    Color: Pale Yellow / Appearance: Clear / S.015 / pH: x  Gluc: x / Ketone: Small  / Bili: Negative / Urobili: Negative   Blood: x / Protein: 30 mg/dL / Nitrite: Negative   Leuk Esterase: Trace / RBC: 0-2 /HPF / WBC 11-25   Sq Epi: x / Non Sq Epi: Few / Bacteria: Occasional                              9.1    18.90 )-----------( 24       ( 2021 17:53 )             26.9     CAPILLARY BLOOD GLUCOSE      POCT Blood Glucose.: 250 mg/dL (2021 23:13)    The Labs were reviewed by me   The Radiology was reviewed by me    EKG tracing reviewed by me

## 2021-06-06 NOTE — PROGRESS NOTE ADULT - PROBLEM SELECTOR PLAN 7
Patient noted to be wheezing on exam.  -hold lasix for now  - clinically euvolemic  - TTE Patient noted to be wheezing on exam  -hold lasix for now  -clinically euvolemic  -TTE

## 2021-06-06 NOTE — H&P ADULT - NSHPPOAPULMEMBOLUS_GEN_A_CORE
Ambulatory Care Coordination Note  6/12/2019  CM Risk Score: 11  Elsy Mortality Risk Score:      ACC: My Euceda, RN    Summary Note: [address] contacted Shantell Belle to follow up. Shantell Belle was admitted on 6/1/2019 and discharged on 6/11/2019 with dx: Heart Failure exacerbation. Today, he is c/o increased fatigue. He denies increased shortness of breath and swelling. Weight at discharge 236 lbs. Weight on admission was 247 lbs. He is aware of 48-64 ounce fluid restriction, low sodium diet and low carb diet. He was sent home with a scale. He met with CHF educator who reinforced zones. He discussed difficulty with weighing due Below RKA. He is planning to weigh daily. He verbalizes understanding of CHF zones and monitoring. Shantell Belle is not willing to use CPAP. Johnie BG levels were elevated during his hospital visit. He reports that his BG levels were 200-300. His A1C was 9.4. He met with Moy Rahman and agreed to once daily insulin. He is unwilling to take meal time insulin. He has increased his Metformin to (2) 500 mg tab BID. Shantell Belle reports that Lantus has not filled as of yet. Shantell Blele refused to be discharged to SNF/rehab. He a below knee amputation. He did agree to [facility] follow up. Shantell Belle reports smoking 5 cig per day and continues to drink alcohol. He has scheduled HFU visits. Future Appointments   Date Time Provider Kiran Stephens   6/17/2019  1:20 PM JOON Gambino - CNP [facility]   6/28/2019 11:00 AM Apple Reyes MD [facility]       ACTION: offered support of insulin instruction. Praised willingness to start insulin. Suggested referral to RD. Reviewed CHF zones. PLAN:f/u on appt. F/u with [facility].      Care Coordination Interventions    Program Enrollment:  Complex Care  Referral from Primary Care Provider:  No  Suggested Interventions and Community Resources  Fall Risk Prevention:  Completed  Home Health Services: Completed (Comment: [facility]  [phone])  Social Work:  In Process  Zone Management Tools: In Process  Other Services or Interventions:  [facility] - skilled care 2 x week and PT 2 x week         Goals Addressed                 This Visit's Progress     Conditions and Symptoms   Improving     I will follow my Zone Management tool to seek urgent or emergent care. Barriers: lack of motivation  Plan for overcoming my barriers: education and follow up   Confidence: 7/10  Anticipated Goal Completion Date: continuous         Self Monitoring   Improving     Daily Weights - I will notify my provider of any increase in weight by 3 or more pounds in 2 days OR 5 or more pounds in a week. None Recently Recorded    Barriers: impairment:  physical: difficulty standing on scale and lack of motivation  Plan for overcoming my barriers: support of [facility] nurse  Confidence: 6/10  Anticipated Goal Completion Date: continuous              Prior to Admission medications    Medication Sig Start Date End Date Taking?  Authorizing Provider   metFORMIN (GLUCOPHAGE) 500 MG tablet Take 2 tablets by mouth 2 times daily (with meals) 6/11/19  Yes JOON Murray CNP   spironolactone (ALDACTONE) 25 MG tablet Take 1 tablet by mouth daily 6/11/19  Yes JOON Lloyd CNP   potassium chloride (KLOR-CON) 8 MEQ extended release tablet Take 8 mEq by mouth 3 times daily   Yes Historical Provider, MD   XARELTO 20 MG TABS tablet TAKE 1 TABLET BY MOUTH DAILY WITH BREAKFAST 5/24/19  Yes Mehdi Petty MD   DULoxetine (CYMBALTA) 30 MG extended release capsule TAKE 1 CAPSULE BY MOUTH DAILY 5/24/19  Yes Mehdi Petty MD   atorvastatin (LIPITOR) 40 MG tablet TAKE 1 TABLET BY MOUTH DAILY 5/24/19  Yes Mehdi Petty MD   traZODone (DESYREL) 50 MG tablet TAKE 1 TABLET BY MOUTH DAILY FOR INSOMNIA 5/7/19  Yes Mehdi Petty MD   amiodarone (CORDARONE) 200 MG tablet Take 1 tablet by mouth daily 2/21/19  Yes Lewis Trinidad MD torsemide (DEMADEX) 20 MG tablet Take 3 tablets by mouth daily 2/21/19  Yes Consuelo Daily MD   MAGNESIUM-OXIDE 400 (241.3 Mg) MG TABS tablet TAKE 1 TABLET BY MOUTH TWICE DAILY 12/7/18  Yes Newton Barrera MD   tamsulosin (FLOMAX) 0.4 MG capsule TAKE 1 CAPSULE BY MOUTH DAILY 12/6/18  Yes Mia Ibarra MD   albuterol sulfate HFA (PROVENTIL HFA) 108 (90 Base) MCG/ACT inhaler Inhale 2 puffs into the lungs every 4 hours as needed for Wheezing or Shortness of Breath (Space out to every 6 hours as symptoms improve) Space out to every 6 hours as symptoms improve. 11/29/18  Yes Mia Ibarra MD   diltiazem (CARDIZEM CD) 240 MG extended release capsule Take 1 capsule by mouth daily 11/1/18  Yes JOON Meneses CNP   gabapentin (NEURONTIN) 100 MG capsule Take 100 mg by mouth 3 times daily. .   Yes Historical Provider, MD   omeprazole (PRILOSEC) 40 MG delayed release capsule Take 1 capsule by mouth daily (with breakfast) 6/11/18  Yes Mia Ibarra MD   insulin glargine (LANTUS SOLOSTAR) 100 UNIT/ML injection pen Inject 35 Units into the skin nightly 6/11/19   JOON Connolly CNP   Insulin Pen Needle ([employer] PEN NEEDLES) 31G X 6 MM MISC 1 each by Does not apply route daily 6/11/19   JOON Connolly CNP   metoprolol succinate (TOPROL XL) 50 MG extended release tablet Take 3 tablets by mouth daily 2/19/19   Consuelo Daily MD   mometasone-formoterol [facility]) 100-5 MCG/ACT inhaler Inhale 2 puffs into the lungs 2 times daily 7/20/18   Amy Elkins MD       Future Appointments   Date Time Provider Kiran Stephens   6/17/2019  1:20 PM Jakie Bernheim, APRN - CNP [facility]   6/28/2019 11:00 AM Mia Ibarra MD [facility]     ,   Diabetes Assessment    Medic Alert ID:  No  Meal Planning:  Avoidance of concentrated sweets   How often do you test your blood sugar?:  No Testing   Do you have barriers with adherence to non-pharmacologic self-management interventions? (Nutrition/Exercise/Self-Monitoring):  No   Have you ever had to go to the ED for symptoms of low blood sugar?:  No       Increase or Decrease trend in Blood Sugars   Do you have hyperglycemia symptoms?:  No   Do you have hypoglycemia symptoms?:  No   Blood Sugar Monitoring Regimen:  Morning Fasting   Blood Sugar Trends:  Steady Increase      ,   Congestive Heart Failure Assessment    Are you currently restricting fluids?:  Other (Comment: 1500 ml)  Do you understand a low sodium diet?:  Yes  Do you understand how to read food labels?:  No  Do you salt your food before tasting it?:  Yes         Symptoms:          and   COPD Assessment    Does the patient understand envrionmental exposure?:  Yes  Is the patient able to verbalize Rescue vs. Long Acting medications?:  Yes  Does the patient have a nebulizer?:  No  Does the patient use a space with inhaled medications?:  Yes            Symptoms:      Symptom course:  improving  Breathlessness:  minimal exertion  Increase use of rapid acting/rescue inhaled medications?:  No  Change in chronic cough?:  No/At Baseline  Change in sputum?:  No/At Baseline  Sputum characteristics:  White no

## 2021-06-06 NOTE — PROGRESS NOTE ADULT - PROBLEM SELECTOR PLAN 10
DVT: SCD iso thrombocytopenia  Diet: Soft/Halal/DASC/CC  Dispo: likely home  Code: full per family, consider palliative consult DVT: SCD iso thrombocytopenia  Diet: Dysphagia/Halal/DASC/CC pending S&S  Dispo: pending clinical improvement  Code: full per family, consider palliative consult DVT: SCD i/s/o thrombocytopenia  Diet: Dysphagia 2 Halal Consistent carbohydrate per S&S  Dispo: Pending clinical improvement  Code: Full per family. Family is not amenable to palliative consult at this time.

## 2021-06-06 NOTE — PHYSICAL THERAPY INITIAL EVALUATION ADULT - ADDITIONAL COMMENTS
Per dtr at bedside, pt lives in house with pt's son, dtr-in-law, and grandchildren. Pt's dtr from [place] also staying with pt at this time to assist as needed. No JUANA and 1st floor setup. Dtr reports pt requires assist at home with all functional mobility and ADLs and standby assist for amb (pt amb by holding onto furniture at home). Dtr reports pt has RW, shower chair, and commode at home.

## 2021-06-06 NOTE — H&P ADULT - PROBLEM SELECTOR PLAN 3
UA mildly positive w/ 11-25 WBC, trace LE, occasional bacteria  -c/w abx as above  -f/u UCx UA mildly positive w/ 11-25 WBC, trace LE, occasional bacteria. Dysuria ongoing x 1 year per patient   -c/w abx as above  -f/u UCx

## 2021-06-06 NOTE — H&P ADULT - ASSESSMENT
86 yo female with a PMHx of CAD w/ TVD (cardiac cath 2019 in [place] revealed LAD 70-80%, OM1 80%,  RCA, family declined CABG at that time), HFpEF, severe AS, HTN, recently dx T2DM (A1C >9), chronic leukocytosis w/ blasts (WBC 13-14 in [place] in 2019), anemia, thrombocytopenia, presenting as transfer from [facility] where she was seen for 5 days of substernal cp that radiates to left jaw, left arm and back w/ associated progressive SANON x 5 days found to be septic possibly 2/2 UTI w/ elevated CE c/f NSTEMI transferred to [facility] for cardiology evaluation.  86 yo female with a PMHx of CAD w/ TVD (cardiac cath 2019 in [place] revealed LAD 70-80%, OM1 80%,  RCA, family declined CABG at that time), HFpEF, severe AS, HTN, recently dx T2DM (A1C >9), chronic leukocytosis w/ blasts (WBC 13-14 in [place] in 2020), anemia, thrombocytopenia, presenting as transfer from [facility] where she was seen for 5 days of substernal cp that radiates to left jaw, left arm and back w/ associated progressive SANON x 5 days found to be septic possibly 2/2 UTI w/ elevated CE c/f NSTEMI transferred to [facility] for cardiology evaluation.  84 yo Amharic speaking female with a PMHx of CAD w/ TVD (cardiac cath 2019 in [place] revealed LAD 70-80%, OM1 80%,  RCA, family declined CABG at that time), HFpEF, severe AS, HTN, recently dx T2DM (A1C >9), chronic leukocytosis w/ blasts (WBC 13-14 in [place] in 2020), anemia, thrombocytopenia, and GERD presenting as transfer from [facility] where she was seen for 5 days of substernal cp that radiates to left jaw, left arm and back w/ associated progressive SANON x 5 days found to be septic possibly 2/2 UTI w/ elevated CE c/f NSTEMI transferred to [facility] for cardiology evaluation.  86 yo Faroese speaking female with a PMHx of CAD w/ TVD (cardiac cath 2019 in [place] revealed LAD 70-80%, OM1 80%,  RCA, family declined CABG at that time), HFpEF, severe AS, HTN, recently dx T2DM (A1C >9), chronic leukocytosis w/ blasts (WBC 13-14 in [place] in 2020), anemia, thrombocytopenia, and GERD presenting as transfer from [facility] where she was seen for 5 days of substernal cp that radiates to left jaw, left arm and back w/ associated progressive SANON x 5 days found to be septic possibly 2/2 UTI vs aspiration PNA given tracheal secretions on CTAP w/ elevated CE c/f NSTEMI transferred to [facility] for cardiology evaluation.

## 2021-06-06 NOTE — PROGRESS NOTE ADULT - PROBLEM SELECTOR PLAN 3
UA mildly positive w/ 11-25 WBC, trace LE, occasional bacteria. Dysuria ongoing x 1 year per patient   -c/w abx as above  -f/u UCx

## 2021-06-06 NOTE — PHYSICAL THERAPY INITIAL EVALUATION ADULT - ASR EQUIP NEEDS DISCH PT EVAL
transfer wheelchair with NATIVIDAD elevating legrests. Rolling walker (?pt owns, if not family will obtain.) Pt owns shower chair and commode

## 2021-06-06 NOTE — H&P ADULT - NSHPPHYSICALEXAM_GEN_ALL_CORE
Vital Signs Last 24 Hrs  T(C): 36.9 (05 Jun 2021 22:56), Max: 38.3 (05 Jun 2021 16:13)  T(F): 98.5 (05 Jun 2021 22:56), Max: 101 (05 Jun 2021 16:13)  HR: 110 (05 Jun 2021 22:56) (74 - 110)  BP: 132/73 (05 Jun 2021 22:56) (112/56 - 137/60)  BP(mean): 78 (05 Jun 2021 21:29) (78 - 82)  RR: 30 (05 Jun 2021 22:56) (16 - 32)  SpO2: 100% (05 Jun 2021 22:56) (96% - 100%)    General: No acute distress.  HEENT: NCAT.  PERRL.  EOMI.  No scleral icterus or injection.  Moist MM.  No oropharyngeal exudates.    Neck: Supple.  Full ROM.  No JVD.  No thyromegaly. No lymphadenopathy.   Heart: RRR.  Normal S1 and S2.  No murmurs, rubs, or gallops.   Lungs: CTAB. No wheezes, crackles, or rhonchi.    Abdomen: BS+, soft, NT/ND.  No organomegaly.  Skin: Warm and dry.  No rashes.  Extremities: No edema, clubbing, or cyanosis.  2+ peripheral pulses b/l.  Musculoskeletal: No deformities.  No spinal or paraspinal tenderness.  Neuro: A&Ox3.  CN II-XII intact.  5/5 strength in UE and LE b/l.  Tactile sensation intact in UE and LE b/l.  Cerebellar function intact Vital Signs Last 24 Hrs  T(C): 36.9 (05 Jun 2021 22:56), Max: 38.3 (05 Jun 2021 16:13)  T(F): 98.5 (05 Jun 2021 22:56), Max: 101 (05 Jun 2021 16:13)  HR: 110 (05 Jun 2021 22:56) (74 - 110)  BP: 132/73 (05 Jun 2021 22:56) (112/56 - 137/60)  BP(mean): 78 (05 Jun 2021 21:29) (78 - 82)  RR: 30 (05 Jun 2021 22:56) (16 - 32)  SpO2: 100% (05 Jun 2021 22:56) (96% - 100%)    General: elderly female in NAD, noted to have audible wheezing   HEENT: NCAT.  PERRL.  EOMI.  No scleral icterus or injection.  Moist MM.  No oropharyngeal exudates.    Heart: RRR.  Normal S1 and S2.  No murmurs, rubs, or gallops. No JVD   Lungs: R sided wheezing, bl rhonchi   Abdomen: BS+, soft, NT/ND.  No organomegaly.  Skin: Warm and dry.  No rashes.  Extremities: No edema, clubbing, or cyanosis   Neuro: A&Ox3. Moves all extremities Vital Signs Last 24 Hrs  T(C): 36.9 (05 Jun 2021 22:56), Max: 38.3 (05 Jun 2021 16:13)  T(F): 98.5 (05 Jun 2021 22:56), Max: 101 (05 Jun 2021 16:13)  HR: 110 (05 Jun 2021 22:56) (74 - 110)  BP: 132/73 (05 Jun 2021 22:56) (112/56 - 137/60)  BP(mean): 78 (05 Jun 2021 21:29) (78 - 82)  RR: 30 (05 Jun 2021 22:56) (16 - 32)  SpO2: 100% (05 Jun 2021 22:56) (96% - 100%)    General: elderly female in NAD, noted to have audible wheezing   HEENT: NCAT.  PERRL.  EOMI.  No scleral icterus or injection.  Moist MM.  No oropharyngeal exudates.    Heart: RRR.  Normal S1 and S2.  No murmurs, rubs, or gallops. No JVD   Lungs: R sided wheezing, bl rhonchi   Abdomen: BS+, soft, NT/ND.  No organomegaly.  Skin: Warm and dry.  No rashes.  Extremities: No edema, clubbing, or cyanosis   Neuro: A&Ox3. Moves all extremities  Psych: no depression Vital Signs Last 24 Hrs  T(C): 36.9 (05 Jun 2021 22:56), Max: 38.3 (05 Jun 2021 16:13)  T(F): 98.5 (05 Jun 2021 22:56), Max: 101 (05 Jun 2021 16:13)  HR: 110 (05 Jun 2021 22:56) (74 - 110)  BP: 132/73 (05 Jun 2021 22:56) (112/56 - 137/60)  BP(mean): 78 (05 Jun 2021 21:29) (78 - 82)  RR: 30 (05 Jun 2021 22:56) (16 - 32)  SpO2: 100% (05 Jun 2021 22:56) (96% - 100%)    General: elderly female in NAD, noted to have audible wheezing   HEENT: NCAT.  PERRL.  EOMI.  No scleral icterus or injection.  Moist MM.  No oropharyngeal exudates.    Heart: RRR.  Normal S1 and S2.  No murmurs, rubs, or gallops. No JVD   Lungs: R sided wheezing, bl rhonchi   Abdomen: BS+, soft, NT/ND.  No organomegaly.  Skin: Warm and dry.  No rashes.  Extremities: No edema, clubbing, or cyanosis   Neuro: A&Ox3. Moves all extremities  Psych: Normal affect, behavior.

## 2021-06-06 NOTE — H&P ADULT - ATTENDING COMMENTS
84 yo Lithuanian only speaking female with a PMHx of CAD w/ TVD (cardiac cath 2019 in [place] revealed LAD 70-80%, OM1 80%,  RCA, family declined CABG at that time), HFpEF, severe AS, HTN, recently dx T2DM (A1C >9), chronic leukocytosis w/ blasts (WBC 13-14 in [place] in 2020), anemia, thrombocytopenia, and GERD presenting as transfer from [facility] where she was seen for NSTEMI, found to be septic possibly 2/2 UTI vs aspiration PNA, transferred to [facility] for cardiology evaluation. f/u cardiology and heme recs. Cont empiric zosyn. Suspicious for aspiration event. needs GOC discussion given high likelihood of hematologic malignancy c/b thrombocytopenia in setting of cardiac event. guarded prognosis.

## 2021-06-06 NOTE — CONSULT NOTE ADULT - SUBJECTIVE AND OBJECTIVE BOX
HPI:  86 yo Albanian speaking female with a PMHx of CAD w/ triple vessel disease(cardiac cath 2019 in [place] revealed LAD 70-80%, OM1 80%,  RCA, family declined CABG at that time), HFpEF, severe AS, HTN, recently dx T2DM (A1C >9), chronic leukocytosis (WBC 13-14 in [place] in ), anemia/thrombocytopenia acutely worsening recently (plt 70s-->20s) w/ blasts, and GERD presenting as transfer from [facility] where she was seen for 5 days of substernal cp that radiates to left jaw, left arm and back w/ associated progressive SANON x 5 days. GI consulted for incidental finding of esophageal thickening on CT.     On presentaiton to OSH, patient febrile, leukocytosis, bands 7%, 10% blasts. Elevated cardiac enzymes. Transferred to [facility] for further management. Being evaluated by cardiology for NSTEMI, evaluated by heme for MDS vs leukemia.     Admission CT chest notable for thickening of esophagus in distal esophagus. Patient reports no dysphagia, no odynophagia. Reports h/o intermittent heartburn. Intermittent chest pain. No abd pain. Patient not sure if on antacids, not sure if had previous EGD.     Allergies:  No Known Allergies      Home Medications:    Hospital Medications:  acetaminophen   Tablet .. 650 milliGRAM(s) Oral every 6 hours PRN  albuterol/ipratropium for Nebulization 3 milliLiter(s) Nebulizer every 6 hours PRN  allopurinol 300 milliGRAM(s) Oral every 12 hours  artificial tears (preservative free) Ophthalmic Solution 1 Drop(s) Both EYES two times a day  atorvastatin 80 milliGRAM(s) Oral at bedtime  dextrose 40% Gel 15 Gram(s) Oral once  dextrose 50% Injectable 25 Gram(s) IV Push once  dextrose 50% Injectable 12.5 Gram(s) IV Push once  dextrose 50% Injectable 25 Gram(s) IV Push once  glucagon  Injectable 1 milliGRAM(s) IntraMuscular once  insulin glargine Injectable (LANTUS) 14 Unit(s) SubCutaneous at bedtime  insulin lispro (ADMELOG) corrective regimen sliding scale   SubCutaneous three times a day before meals  insulin lispro (ADMELOG) corrective regimen sliding scale   SubCutaneous at bedtime  insulin lispro Injectable (ADMELOG) 4 Unit(s) SubCutaneous three times a day before meals  melatonin 3 milliGRAM(s) Oral at bedtime  metoprolol tartrate 25 milliGRAM(s) Oral two times a day  metoprolol tartrate Injectable 5 milliGRAM(s) IV Push every 6 hours PRN  pantoprazole    Tablet 40 milliGRAM(s) Oral before breakfast  piperacillin/tazobactam IVPB.. 3.375 Gram(s) IV Intermittent every 8 hours  polyethylene glycol 3350 17 Gram(s) Oral daily  senna 2 Tablet(s) Oral at bedtime      PMHX/PSHX:  Diabetes mellitus    CAD (coronary artery disease)    Chronic diastolic congestive heart failure    Severe aortic stenosis    Hypertension    No significant past surgical history        Family history:  No pertinent family history in first degree relatives        Denies family history of colon cancer/polyps, stomach cancer/polyps, pancreatic cancer/masses, liver cancer/disease, ovarian cancer and endometrial cancer.    Social History:   Tob: Denies  EtOH: Denies  Illicit Drugs: Denies    ROS:     General:  No wt loss, fevers, chills, night sweats, fatigue  Eyes:  Good vision, no reported pain  ENT:  No sore throat, pain, runny nose, dysphagia  CV:  No pain, palpitations, hypo/hypertension  Pulm:  No dyspnea, cough, tachypnea, wheezing  GI:  see HPI  :  No pain, bleeding, incontinence, nocturia  Muscle:  No pain, weakness  Neuro:  No weakness, tingling, memory problems  Psych:  No fatigue, insomnia, mood problems, depression  Endocrine:  No polyuria, polydipsia, cold/heat intolerance  Heme:  No petechiae, ecchymosis, easy bruisability  Skin:  No rash, tattoos, scars, edema    PHYSICAL EXAM:     GENERAL:  No acute distress  HEENT:  NCAT, no scleral icterus   CHEST:  no respiratory distress  HEART:  Regular rate and rhythm  ABDOMEN:  Soft, non-tender, non-distended, normoactive bowel sounds,  no masses, no hepato-splenomegaly, no signs of chronic liver disease  EXTREMITIES: No edema  SKIN:  No rash/erythema/ecchymoses/petechiae/wounds/abscess/warm/dry  NEURO:  Alert and oriented x 3, no asterixis    Vital Signs:  Vital Signs Last 24 Hrs  T(C): 36.4 (2021 11:54), Max: 37.8 (2021 08:09)  T(F): 97.6 (2021 11:54), Max: 100.1 (2021 08:09)  HR: 97 (2021 11:54) (97 - 126)  BP: 103/62 (2021 11:54) (87/57 - 132/73)  BP(mean): 78 (2021 21:29) (78 - 79)  RR: 18 (2021 11:54) (18 - 30)  SpO2: 100% (2021 11:54) (96% - 100%)  Daily Height in cm: 160.02 (2021 22:56)    Daily Weight in k.2 (2021 04:35)    LABS:                        7.8    23.14 )-----------( 27       ( 2021 15:05 )             23.8     Mean Cell Volume: 85.6 fl (21 @ 15:05)    -    131<L>  |  99  |  14  ----------------------------<  225<H>  4.0   |  19<L>  |  0.64    Ca    8.5      2021 00:38  Phos  3.2     06-06  Mg     2.3     06-    TPro  8.0  /  Alb  3.2<L>  /  TBili  1.1  /  DBili  x   /  AST  45<H>  /  ALT  22  /  AlkPhos  108  06-06    LIVER FUNCTIONS - ( 2021 00:38 )  Alb: 3.2 g/dL / Pro: 8.0 g/dL / ALK PHOS: 108 U/L / ALT: 22 U/L / AST: 45 U/L / GGT: x           PT/INR - ( 2021 00:38 )   PT: 13.9 sec;   INR: 1.17 ratio         PTT - ( 2021 00:38 )  PTT:28.4 sec  Urinalysis Basic - ( 2021 18:12 )    Color: Pale Yellow / Appearance: Clear / S.015 / pH: x  Gluc: x / Ketone: Small  / Bili: Negative / Urobili: Negative   Blood: x / Protein: 30 mg/dL / Nitrite: Negative   Leuk Esterase: Trace / RBC: 0-2 /HPF / WBC 11-25   Sq Epi: x / Non Sq Epi: Few / Bacteria: Occasional                              7.8    23.14 )-----------( 27       ( 2021 15:05 )             23.8                         7.8    17.68 )-----------( 23       ( 2021 07:26 )             23.5                         8.8    22.47 )-----------( 25       ( 2021 00:38 )             28.0                         9.1    18.90 )-----------( 24       ( 2021 17:53 )             26.9       Imaging:  CT Chest/abdomen  FINDINGS:  CHEST:  LUNGS AND LARGE AIRWAYS: Secretions within the extrathoracic trachea. Scattered linear subsegmental atelectasis. Evaluation of the lung parenchyma is limited by respiratory motion.  PLEURA: No pleural effusion.  VESSELS: No aortic intramural hematoma, aneurysm, or evidence of acute dissection. Atherosclerotic calcifications of the thoracic aorta.  HEART: Cardiomegaly. No pericardial effusion. Aortic valvular and coronary artery calcification.  MEDIASTINUM AND RAMANA: Suspicion for eccentric wall thickening involving the distal esophagus.  CHEST WALL AND LOWER NECK: Within normal limits.    ABDOMEN AND PELVIS:  LIVER: Steatosis.  BILE DUCTS: Normal caliber.  GALLBLADDER: Cholelithiasis.  SPLEEN: Within normal limits.  PANCREAS: Within normal limits.  ADRENALS: Within normal limits.  KIDNEYS/URETERS: Right renal cyst. No hydronephrosis.    BLADDER: Within normal limits.  REPRODUCTIVE ORGANS: Uterus is unremarkable. Coarse calcifications within the bilateral adnexa, nonspecific..    BOWEL: No bowel obstruction. Colonic diverticulosis.  PERITONEUM: No ascites.  VESSELS: Atherosclerotic changes. Patent celiac, superior mesenteric, inferior mesenteric, and bilateral renal arteries. Superiormesenteric artery demonstrates at least moderate stenosis of its proximal segment.  RETROPERITONEUM/LYMPH NODES: No lymphadenopathy.  ABDOMINAL WALL: Within normal limits.  BONES: Degenerative changes. Age indeterminant moderate compression deformityof T5 and anterior wedging of the L1 vertebral body.    IMPRESSION:  No aortic dissection.    Eccentric wall thickening involving the distal esophagus. Further evaluation with upper endoscopy is recommended to exclude underlying malignancy/lesion.

## 2021-06-06 NOTE — PROGRESS NOTE ADULT - SUBJECTIVE AND OBJECTIVE BOX
[facility]    Som Motta, Deepti, Trinidad, Jose M, Zaid, Madonna      [phone]    CHIEF COMPLAINT: Patient is a 85y old  Female who presents with a chief complaint of chest pain (06 Jun 2021 00:03)      Follow Up: cp, sob, AS, CAD    Interim history: seen in the er in [facility] last night. transferred for expedited heme and cts evals. noted dyspnea upon arrival and given diuretics and bipap. has been more comfortable overnight. daughter reports that she was coughing up blood overnight    MEDICATIONS  (STANDING):  atorvastatin 80 milliGRAM(s) Oral at bedtime  dextrose 40% Gel 15 Gram(s) Oral once  dextrose 50% Injectable 25 Gram(s) IV Push once  dextrose 50% Injectable 12.5 Gram(s) IV Push once  dextrose 50% Injectable 25 Gram(s) IV Push once  famotidine    Tablet 40 milliGRAM(s) Oral daily  glucagon  Injectable 1 milliGRAM(s) IntraMuscular once  insulin lispro (ADMELOG) corrective regimen sliding scale   SubCutaneous three times a day before meals  insulin lispro (ADMELOG) corrective regimen sliding scale   SubCutaneous at bedtime  metoprolol tartrate 25 milliGRAM(s) Oral two times a day  piperacillin/tazobactam IVPB.. 3.375 Gram(s) IV Intermittent every 8 hours    MEDICATIONS  (PRN):  acetaminophen   Tablet .. 650 milliGRAM(s) Oral every 6 hours PRN Mild Pain (1 - 3), Moderate Pain (4 - 6)      REVIEW OF SYSTEMS: unable-lethargic and on bipap    Vital Signs Last 24 Hrs  T(C): 36.3 (06 Jun 2021 04:35), Max: 38.3 (05 Jun 2021 16:13)  T(F): 97.4 (06 Jun 2021 04:35), Max: 101 (05 Jun 2021 16:13)  HR: 126 (06 Jun 2021 04:35) (74 - 126)  BP: 107/57 (06 Jun 2021 04:35) (107/57 - 137/60)  BP(mean): 78 (05 Jun 2021 21:29) (78 - 82)  RR: 22 (06 Jun 2021 04:35) (16 - 32)  SpO2: 96% (06 Jun 2021 04:35) (96% - 100%)    I&O's Summary    05 Jun 2021 07:01  -  06 Jun 2021 06:48  --------------------------------------------------------  IN: 120 mL / OUT: 650 mL / NET: -530 mL        Telemetry past 24h: st    PHYSICAL EXAM:    Constitutional: well-nourished, well-developed, leth  HEENT:  bipap, sclerae anicteric, conjunctivae clear, no oral cyanosis.  Pulmonary: Non-labored, breath sounds are clear bilaterally, No wheezing, rales or rhonchi  Cardiovascular: Regular, S1 and S2.  distant, tachy, 2/6 sys murmur.  No rubs, gallops or clicks  Gastrointestinal: Bowel Sounds present, soft, nontender.   Lymph: No peripheral edema.   Neurological: leth  Skin: No rashes.  Psych:  leth    LABS: All Labs Reviewed:                        8.8    22.47 )-----------( 25       ( 06 Jun 2021 00:38 )             28.0                         9.1    18.90 )-----------( 24       ( 05 Jun 2021 17:53 )             26.9     06 Jun 2021 00:38    131    |  99     |  14     ----------------------------<  225    4.0     |  19     |  0.64   05 Jun 2021 16:47    133    |  100    |  16     ----------------------------<  259    4.1     |  21     |  0.91     Ca    8.5        06 Jun 2021 00:38  Ca    8.5        05 Jun 2021 16:47  Phos  3.2       06 Jun 2021 00:38  Phos  2.9       05 Jun 2021 16:47  Mg     2.3       06 Jun 2021 00:38  Mg     1.8       05 Jun 2021 16:47    TPro  8.0    /  Alb  3.2    /  TBili  1.1    /  DBili  x      /  AST  45     /  ALT  22     /  AlkPhos  108    06 Jun 2021 00:38  TPro  9.3    /  Alb  3.1    /  TBili  0.9    /  DBili  x      /  AST  42     /  ALT  27     /  AlkPhos  127    05 Jun 2021 16:47    PT/INR - ( 06 Jun 2021 00:38 )   PT: 13.9 sec;   INR: 1.17 ratio         PTT - ( 06 Jun 2021 00:38 )  PTT:28.4 sec  CARDIAC MARKERS ( 06 Jun 2021 00:38 )  x     / x     / 261 U/L / x     / 30.9 ng/mL  CARDIAC MARKERS ( 05 Jun 2021 16:47 )  .494 ng/mL / x     / x     / x     / 4.4 ng/mL      Blood Culture:   06-05 @ 16:47  Pro Bnp 1790    06-06 @ 02:49  TSH: 1.97      RADIOLOGY:    EKG:    Echo:

## 2021-06-06 NOTE — H&P ADULT - NSHPSOCIALHISTORY_GEN_ALL_CORE
Denies AMBER x 3 Denies Tobacco, alcohol, drugs x 3    Lives with Son  Daughter is physician, makes patients decisions (no HCP on file) Denies Tobacco, alcohol, drugs x 3    Lives with Son  Daughter is physician. Son and daughter make decisions (no HCP on file)

## 2021-06-06 NOTE — CHART NOTE - NSCHARTNOTEFT_GEN_A_CORE
Assessed patient at bedside after transfer from [place]  Currently denies any chest pain but family member does note increased work of breathing and SOB that worsened after she received IVF at OSH. On exam, 4/6 holosystolic murmur with expiratory wheezing throughout along with increased respiratory rate No LE edema. Would give IV lasix 20mg x1 along with bipap. Assess sx and UOP within 1 hour to determine need for further diuresis. Would opt for more gentle diuresis given her severe AS.   -continue to trend trop and CKMB (expect that this would be elevated in setting of sepsis and hx of CAD that was not intervened on--> demand ischemia)  -obtain TTE  -unlikely that this is ACS. Primary team to work up underlying heme condition (thrombocytopenic with platelet 25)

## 2021-06-06 NOTE — H&P ADULT - NSHPREVIEWOFSYSTEMS_GEN_ALL_CORE
REVIEW OF SYSTEMS:    CONSTITUTIONAL: No weakness, fevers or chills  EYES/ENT: No visual changes;  No vertigo or throat pain   NECK: No pain or stiffness  RESPIRATORY: No cough, wheezing, hemoptysis; No shortness of breath  CARDIOVASCULAR: No chest pain or palpitations  GASTROINTESTINAL: No abdominal or epigastric pain. No nausea, vomiting, or hematemesis; No diarrhea or constipation. No melena or hematochezia.  GENITOURINARY: No dysuria, frequency or hematuria  NEUROLOGICAL: No numbness or weakness  SKIN: No itching, burning, rashes, or lesions   All other review of systems is negative unless indicated above. REVIEW OF SYSTEMS:    CONSTITUTIONAL: No weakness, fevers or chills  EYES/ENT: No visual changes;  No vertigo or throat pain   NECK: No pain or stiffness  RESPIRATORY: No cough, wheezing, hemoptysis; No shortness of breath  CARDIOVASCULAR: No chest pain or palpitations  GASTROINTESTINAL: No abdominal or epigastric pain. No nausea, vomiting, or hematemesis; No diarrhea or constipation. No melena or hematochezia.  GENITOURINARY: No dysuria, frequency or hematuria  NEUROLOGICAL: No numbness or weakness  SKIN: No itching, burning, rashes, or lesions   Psych No depression or anxiety  All other review of systems is negative unless indicated above.

## 2021-06-06 NOTE — CONSULT NOTE ADULT - ASSESSMENT
84 y/o F with a PMHx of CAD w/ TVD (cardiac cath 2019 in [place] revealed LAD 70-80%, OM1 80%,  RCA, family declined CABG at that time), HFpEF, severe AS, HTN, recently dx T2DM (A1C >9), chronic leukocytosis w/ blasts (WBC 13-14 in [place] in 2020), anemia, thrombocytopenia, and GERD presenting as transfer from [facility] where she was seen for 5 days of substernal CP radiating to the left jaw, left arm and back w/ associated progressive SANON. Pt found to be septic possibly 2/2 UTI vs aspiration PNA given tracheal secretions on CTAP w/ elevated CE c/f NSTEMI transferred to [facility] for cardiology evaluation. CT surgery consulted for evaluation given pt's h/o 3VD and AS.     Pt seen and evaluated w/ daughter at bedside - daughter makes the pt's medical decisions along with her brother. Pt's daughter states that they have refused surgical intervention in the past and are still not amenable to it at this time. D/w CT surgery attending on call Dr. Ndiaye - no surgical intervention at this time as family is currently refusing surgery and given pt's current condition/concern for malignancy. Recommend continuation of medical management as per primary team and cardiology.

## 2021-06-06 NOTE — CONSULT NOTE ADULT - ASSESSMENT
Patient is an 84 y/o (Portuguese-speaking) F w/ a PMHx of CAD w/ TVD (cardiac cath 2019 in [place] revealed LAD 70-80%, OM1 80%,  RCA, family declined CABG at that time), HFpEF, severe AS, HTN, recently dx T2DM (A1C >9), chronic leukocytosis (WBC 13-14 in [place] in 2020), anemia/thrombocytopenia, and GERD who presented as transfer from [facility] where she was seen for 5 days for substernal chest pain that radiated to left jaw, left arm, and back w/ associated progressive SANON x 5 days, found to have NSTEMI vs demand ischemia, met sepsis criteria, and also found to have a leukocytosis w/ 10% blasts, and was admitted to telemetry for further management. Hematology was consulted for further evaluation.        *** NOTE INCOMPLETE *** Patient is an 84 y/o (Khmer-speaking) F w/ a PMHx of CAD w/ TVD (cardiac cath 2019 in [place] revealed LAD 70-80%, OM1 80%,  RCA, family declined CABG at that time), HFpEF, severe AS, HTN, recently dx T2DM (A1C >9), chronic leukocytosis (WBC 13-14 in [place] in 2020), anemia/thrombocytopenia, and GERD who presented as transfer from [facility] where she was seen for 5 days for substernal chest pain that radiated to left jaw, left arm, and back w/ associated progressive SANON x 5 days, found to have NSTEMI vs demand ischemia, met sepsis criteria, and also found to have a leukocytosis w/ 10% blasts, and was admitted to telemetry for further management. Hematology was consulted for further evaluation.      # NSTEMI  - In the setting of triple vessel disease  - Appreciate Cardiology evaluation. Follow-up recs  - Trend Radha and c/w telemetry monitoring  - Appreciate care as per primary team    # Concern for MDS vs leukemia  - CBC on presentation showed a leukocytosis (WBC: 18.90 w/ 10% blasts), normocytic anemia (Hgb: 9.1, MCV: 83.5), and thrombocytopenia (PLT = 24k).   - Labs were reviewed: Iron studies are suggestive of AoCD. FOBT was negative x1. Available DIC and TLS labs appear unremarkable. She also has elevated Radha.  - Peripheral smear reviewed: Normochromic RBCs, Rare schistocyte, True thrombocytopenia with no platelet clumping, Few large platelet seen, Multiple blast-like cells w/ high N:C ratio seen  - Overall, patient's labs are concerning for an underlying high-risk MDS vs acute leukemia  - Will check a peripheral blood flow cytometry (Green/lavender tubes and lab requisition were given to patient's primary RN)  - Check B12, Folate, Hepatitis profile (including Hep. B total Core Ab and Hep. B Surface Ab), HIV, and G6PD  - Monitor CBC with DIFF and keep active T+S. Transfuse for Hgb < 7, PLT < 10 (if afebrile), PLT < 15 (if febrile), or PLT < 50 (if bleeding)  - Monitor TLS labs (CMP w/ Mg/Phos, LDH, Uric acid) daily  - Follow-up TTE  - Would need a BMBx in order to confirm diagnosis; however, given her respiratory status, she would be unlikely to tolerate the procedure at this juncture. Will reassess once pt is clinically improved  - Will continue to follow    # Abnormal CT Scan  - CT C/A/P (6/5) was notable for eccentric wall thickening involving the distal esophagus  - Consider GI evaluation if patient clinically improves    Ron Fontenot, PGY5  Hematology-Oncology Fellow  Pager: [phone] / [phone] Patient is an 86 y/o (Serbian-speaking) F w/ a PMHx of CAD w/ TVD (cardiac cath 2019 in [place] revealed LAD 70-80%, OM1 80%,  RCA, family declined CABG at that time), HFpEF, severe AS, HTN, recently dx T2DM (A1C >9), chronic leukocytosis (WBC 13-14 in [place] in 2020), anemia/thrombocytopenia, and GERD who presented as transfer from [facility] where she was seen for 5 days for substernal chest pain that radiated to left jaw, left arm, and back w/ associated progressive SANON x 5 days, found to have NSTEMI vs demand ischemia, met sepsis criteria, and also found to have a leukocytosis w/ 10% blasts, and was admitted to telemetry for further management. Hematology was consulted for further evaluation.      # NSTEMI  - In the setting of triple vessel disease  - Appreciate Cardiology evaluation. Follow-up recs  - Trend Radha and c/w telemetry monitoring  - Appreciate care as per primary team    # Concern for MDS vs leukemia  - CBC on presentation showed a leukocytosis (WBC: 18.90 w/ 10% blasts), normocytic anemia (Hgb: 9.1, MCV: 83.5), and thrombocytopenia (PLT = 24k).   - Labs were reviewed: Iron studies are suggestive of AoCD. FOBT was negative x1. Available DIC and TLS labs appear unremarkable. She also has elevated Radha.  - Peripheral smear reviewed: Normochromic RBCs, Rare schistocyte, True thrombocytopenia with no platelet clumping, Few large platelet seen, Multiple blast-like cells w/ high N:C ratio seen  - Overall, patient's labs are concerning for an underlying high-risk MDS vs acute leukemia  - Will check a peripheral blood flow cytometry (Green/lavender tubes and lab requisition were given to patient's primary RN)  - Check B12, Folate, Hepatitis profile (including Hep. B total Core Ab and Hep. B Surface Ab), HIV, and G6PD  - Monitor CBC with DIFF and keep active T+S. Transfuse for Hgb < 7, PLT < 10 (if afebrile), PLT < 15 (if febrile), or PLT < 50 (if bleeding)  - Monitor TLS labs (CMP w/ Mg/Phos, LDH, Uric acid) daily  - Please start Allopurinol (renally-dosed)  - Follow-up TTE  - Would need a BMBx in order to confirm diagnosis; however, given her respiratory status, she would be unlikely to tolerate the procedure at this juncture. Will reassess once pt is clinically improved  - Will continue to follow    # Abnormal CT Scan  - CT C/A/P (6/5) was notable for eccentric wall thickening involving the distal esophagus  - Consider GI evaluation if patient clinically improves    Ron Fontenot, PGY5  Hematology-Oncology Fellow  Pager: [phone] / [phone]

## 2021-06-07 NOTE — PROGRESS NOTE ADULT - PROBLEM SELECTOR PLAN 5
Patient w/ baseline leukocytosis 13-14 since 2019 (in [place]), w/ blasts seen on outpatient w/u c/f possible leukemia vs. MDS   -10% blasts noted during this admission, elevated protein gap as noted   -CTA C/A/P revealing eccentric thickening of distal eso, further w/o to r/o malignancy recommended  -heme/onc consult  -needs eventual GI consult Normocytic anemia (bl 11-12), thrombocytopenia (bl plt 75, recent downtrend to 35 as outpatient)  -keep active T&S  -transfuse Hb <7, transfuse plt <15 if febrile  -heme/onc following

## 2021-06-07 NOTE — CONSULT NOTE ADULT - ASSESSMENT
85 Khmer speaking female with a PMH CAD w/ TVD (cardiac cath 2019 in [place] revealed LAD 70-80%, OM1 80%,  RCA, family declined CABG at that time), HFpEF, severe AS, HTN, recently dx T2DM (A1C >9), chronic leukocytosis (WBC 13-14 in [place] in 2020), anemia/thrombocytopenia acutely worsening recently (plt 70s-->20s) w/ blasts, and GERD presenting as transfer from [facility] where she was seen for 5 days of substernal cp that radiates to left jaw, left arm and back w/ associated progressive SANON x 5 days, found to be in ACS.    # Hypoxemia and increased WOB  - Etiology: Low flow state vs splinting vs valvulopathies vs pulmonary edema vs undiagnosed emphysema  - At this time, patient is currently in ACS. Acute care per Cardiology  - If patient is able to recover from this event, can manage as follows:   -- duonebs standing to encourage bronchodilation  -- IS at bedside  -- Adequate pain control to prevent splinting  -- c/w abx for now  -- optimize volume status with IV loop diuretics  -- Supplemental O2 to maintain SpO2 > 90% but < 98%    Overall prognosis very guarded. Recommend family discussion with cardiology and palliative.        Pascual Carey MD  PGY-6  Pulmonary and Critical Care Fellow  Pager: [phone]

## 2021-06-07 NOTE — PROGRESS NOTE ADULT - ATTENDING COMMENTS
multi-organ disease  advanced CAD  unable to use anti-plt or full dose AC due to severe thrombocytopenia  appreciate heme recs - await BM bx when possible  r/o MDS vs leukemia  supportive transfusions  trend CBC    adjust insulin dose daily as appropriate based on glucose levels.     empiric Zosyn   f/up cultures  source ?bronchitis - f/up sputum culture  low EF on echo - lasix on hold  trend lactate    patient with multiple co-morbid conditions; higher risk for future complications despite optimal medical therapy   d/w daughter Dr. Rivero the plan of care and guarded prognosis
pt seen at bedside on 6/7 @11:45pm  2 daughters at besided  all questions answered.    pt with worsening shortness of breath and increased restlessness.    86 yo Polish only speaking female with a PMHx of CAD w/ TVD (cardiac cath 2019 in [place] revealed LAD 70-80%, OM1 80%,  RCA, family declined CABG at that time), HFpEF, severe AS, HTN, recently dx T2DM (A1C >9), chronic leukocytosis w/ blasts (WBC 13-14 in [place] in 2020), anemia, thrombocytopenia, and GERD presenting as transfer from [facility] where she was seen for NSTEMI, found to be septic possibly 2/2 UTI vs aspiration PNA, transferred to [facility] for cardiology evaluation.     Shortness of breath, acute hypoxic respiratory failure.  - check CXR  - Give stat dose of lasix 40mg IV  - titrate supplemental oxygen to 90%, c/w NC but can up titrate to bipap if needed.  - Pulm eval called per daughter request.    r/o Leukemia  - Heme planning Bone marrow biopsy today.    NSTEMI, Severe Aortic stenosis  - pt not a candidate for any intervention at this time given concern for malignancy and underlying thrombocytopenia  - General Cardiology and structural cardiology eval appreciated.    Fever  - presumed to be in the setting of malignancy.  - empiric abx  - ID eval appreciated.

## 2021-06-07 NOTE — PROGRESS NOTE ADULT - PROBLEM SELECTOR PLAN 10
DVT: SCD iso thrombocytopenia  Diet: Soft/Halal/DASC/CC  Dispo: likely home  Code: full per family, consider palliative consult

## 2021-06-07 NOTE — PROVIDER CONTACT NOTE (CRITICAL VALUE NOTIFICATION) - TEST AND RESULT REPORTED:
CO2- less than 10
Lactate 2.9 from VBG
lactate 4.6 from VBG
Troponin 422
Lactate 4.9
pH- 7.06, Lactate- 18
Lactate 18.4

## 2021-06-07 NOTE — CONSULT NOTE ADULT - SUBJECTIVE AND OBJECTIVE BOX
HPI:    PAST MEDICAL & SURGICAL HISTORY:  Diabetes mellitus    CAD (coronary artery disease)    Chronic diastolic congestive heart failure    Severe aortic stenosis    Hypertension    No significant past surgical history        FAMILY HISTORY:  No pertinent family history in first degree relatives        SOCIAL HISTORY:  Smoking: __ packs x ___ years  EtOH Use:  Marital Status:  Occupation:  Exposures:  Recent Travel:    Allergies    No Known Allergies    Intolerances        HOME MEDICATIONS:    REVIEW OF SYSTEMS:  CONSTITUTIONAL: No weakness, fevers or chills  EYES/ENT: No visual changes;  No vertigo or throat pain   NECK: No pain or stiffness  RESPIRATORY: No cough, wheezing, hemoptysis; No shortness of breath  CARDIOVASCULAR: No chest pain or palpitations  GASTROINTESTINAL: No abdominal or epigastric pain. No nausea, vomiting, or hematemesis; No diarrhea or constipation. No melena or hematochezia.  GENITOURINARY: No dysuria, frequency or hematuria  NEUROLOGICAL: No numbness or weakness  SKIN: No itching, burning, rashes, or lesions   All other review of systems is negative unless indicated above.    OBJECTIVE:  ICU Vital Signs Last 24 Hrs  T(C): 37.3 (2021 09:35), Max: 39.3 (2021 03:29)  T(F): 99.2 (2021 09:35), Max: 102.8 (2021 03:29)  HR: 130 (2021 10:08) (103 - 130)  BP: 114/76 (2021 10:07) (113/62 - 121/75)  BP(mean): --  ABP: --  ABP(mean): --  RR: 18 (2021 09:35) (18 - 20)  SpO2: 97% (2021 10:08) (94% - 100%)         @ 07:01  -  - @ 07:00  --------------------------------------------------------  IN: 720 mL / OUT: 400 mL / NET: 320 mL      CAPILLARY BLOOD GLUCOSE      POCT Blood Glucose.: 290 mg/dL (2021 13:00)      PHYSICAL EXAM:  General: WN/WD NAD  Neurology: A&Ox3, nonfocal, YU x 4  Eyes: PERRLA/ EOMI, Gross vision intact  ENT/Neck: Neck supple, trachea midline, No JVD, Gross hearing intact  Respiratory: CTA B/L, No wheezing, rales, rhonchi  CV: RRR, +S1/S2, -S3/S4, no murmurs, rubs or gallops  Abdominal: Soft, NT, ND +BS, No HSM  MSK: 5/5 strength UE/LE bilaterally  Extremities: No edema, 2+ peripheral pulses  Skin: No Rashes, Hematoma, Ecchymosis  Incisions:   Tubes:    HOSPITAL MEDICATIONS:  MEDICATIONS  (STANDING):  allopurinol 300 milliGRAM(s) Oral every 12 hours  artificial tears (preservative free) Ophthalmic Solution 1 Drop(s) Both EYES two times a day  atorvastatin 80 milliGRAM(s) Oral at bedtime  dextrose 40% Gel 15 Gram(s) Oral once  dextrose 50% Injectable 25 Gram(s) IV Push once  dextrose 50% Injectable 12.5 Gram(s) IV Push once  dextrose 50% Injectable 25 Gram(s) IV Push once  glucagon  Injectable 1 milliGRAM(s) IntraMuscular once  heparin   Injectable 5000 Unit(s) IV Push once  insulin glargine Injectable (LANTUS) 17 Unit(s) SubCutaneous at bedtime  insulin lispro (ADMELOG) corrective regimen sliding scale   SubCutaneous three times a day before meals  insulin lispro (ADMELOG) corrective regimen sliding scale   SubCutaneous at bedtime  insulin lispro Injectable (ADMELOG) 9 Unit(s) SubCutaneous three times a day before meals  lidocaine 2% Injectable 20 milliLiter(s) Local Injection once  melatonin 3 milliGRAM(s) Oral at bedtime  metoprolol tartrate 25 milliGRAM(s) Oral two times a day  pantoprazole  Injectable 40 milliGRAM(s) IV Push two times a day  piperacillin/tazobactam IVPB.. 3.375 Gram(s) IV Intermittent every 8 hours  polyethylene glycol 3350 17 Gram(s) Oral daily  senna 2 Tablet(s) Oral at bedtime  simethicone 80 milliGRAM(s) Chew three times a day    MEDICATIONS  (PRN):  acetaminophen   Tablet .. 650 milliGRAM(s) Oral every 6 hours PRN Mild Pain (1 - 3), Moderate Pain (4 - 6)  albuterol/ipratropium for Nebulization 3 milliLiter(s) Nebulizer every 6 hours PRN Shortness of Breath and/or Wheezing  metoprolol tartrate Injectable 5 milliGRAM(s) IV Push every 6 hours PRN HR > 110  traMADol 25 milliGRAM(s) Oral every 6 hours PRN Moderate Pain (4 - 6)      LABS:                        8.1    24.48 )-----------( 23       ( 2021 06:19 )             25.0     06-07    129<L>  |  96  |  22  ----------------------------<  234<H>  4.3   |  18<L>  |  1.06    Ca    8.7      2021 06:20  Phos  3.5     06-07  Mg     2.4     06-07    TPro  8.1  /  Alb  3.0<L>  /  TBili  1.8<H>  /  DBili  x   /  AST  39  /  ALT  24  /  AlkPhos  87  06-07    PT/INR - ( 2021 06:19 )   PT: 16.6 sec;   INR: 1.41 ratio         PTT - ( 2021 06:19 )  PTT:29.6 sec  Urinalysis Basic - ( 2021 18:12 )    Color: Pale Yellow / Appearance: Clear / S.015 / pH: x  Gluc: x / Ketone: Small  / Bili: Negative / Urobili: Negative   Blood: x / Protein: 30 mg/dL / Nitrite: Negative   Leuk Esterase: Trace / RBC: 0-2 /HPF / WBC 11-25   Sq Epi: x / Non Sq Epi: Few / Bacteria: Occasional        Venous Blood Gas:   @ 06:24  7.42/35/33/22/58  VBG Lactate: 4.6  Venous Blood Gas:   @ 00:41  7.36/43/29/24/46  VBG Lactate: 2.9      MICROBIOLOGY:     RADIOLOGY:  [ ] Reviewed by me    Point of Care Ultrasound Findings:    PFT:    EKG:   HPI:  85 Pashto speaking female with a PMH CAD w/ TVD (cardiac cath 2019 in [place] revealed LAD 70-80%, OM1 80%,  RCA, family declined CABG at that time), HFpEF, severe AS, HTN, recently dx T2DM (A1C >9), chronic leukocytosis (WBC 13-14 in [place] in ), anemia/thrombocytopenia acutely worsening recently (plt 70s-->20s) w/ blasts, and GERD presenting as transfer from [facility] where she was seen for 5 days of substernal cp that radiates to left jaw, left arm and back w/ associated progressive SANON x 5 days. Found to meet SIRS criteria w/ Tmax 101, WBC 18, 7% bands, 10% blasts, lactate 4.7 --> 3, sinus tach 110 w/ PVCs w/ mildly positive UA (11-25 WBC, trace LE, occasional bacteria) and w/ elevated cardiac enzymes (TropI 0.494, CKMB 4.4, BNP 1790) w/o EKG changes c/f NSTEMI vs. demand ischemia. Evaluated by cardiology. Recommendation to hold hep gtt and DAPT due to thrombocytopenia and transfer to [facility] center for higher acuity of care. Also evaluated by critical care, found to have low EF and apical hypokinesis on bedside POCUS. Per son, no hx of cancer in patient or family. Per daughter, flow cytometry checked in [facility] in  was inconclusive. Blasts noted on CBC within the last week only.     On examination today, she was tachypneic and visibly uncomfortable.  Her primary complaint was of chest pain and back pain. She was reportedly being evaluated at an outside hospital for PCI/TAVR which was deferred due to her anemia and thrombocytopenia. She was schedule for surgery this Thursday per daughter. All cultures negative to date so far, currently on zosyn, uptrending trops. EKG performed at bedside demonstrate known LBBB with equivocal ST changes. Patient developed sudden onset hypoxemia, chest pain, and increased WOB, requiring NIPPV. Bedside POCUS demonstrated Bilateral B-lines and globally severely decreased LV function with at least moderate MR with tethered posterior mitral valve. Compared to previous TTE studies, this is concern for ACS with MR causing flash pulmonary edema. SV was low, high suspicion for low perfusion state. CCU consulted for increased acuity of care.     PAST MEDICAL & SURGICAL HISTORY:  Diabetes mellitus    CAD (coronary artery disease)    Chronic diastolic congestive heart failure    Severe aortic stenosis    Hypertension    No significant past surgical history        FAMILY HISTORY:  No pertinent family history in first degree relatives        SOCIAL HISTORY:  Smoking: deneis  EtOH Use: denies  Occupation: none  Exposures: none  Recent Travel: none    Allergies    No Known Allergies    Intolerances        HOME MEDICATIONS:    REVIEW OF SYSTEMS:  CONSTITUTIONAL: No weakness, fevers or chills  EYES/ENT: No visual changes;  No vertigo or throat pain   NECK: No pain or stiffness  RESPIRATORY: No cough, wheezing, hemoptysis; +shortness of breath  CARDIOVASCULAR: +chest pain. No palpitations  GASTROINTESTINAL: +abdominal/epigastric pain. +nausea. No vomiting, or hematemesis; No diarrhea or constipation. No melena or hematochezia.  GENITOURINARY: No dysuria, frequency or hematuria  NEUROLOGICAL: No numbness or weakness  SKIN: No itching, burning, rashes, or lesions   All other review of systems is negative unless indicated above.    OBJECTIVE:  ICU Vital Signs Last 24 Hrs  T(C): 37.3 (2021 09:35), Max: 39.3 (2021 03:29)  T(F): 99.2 (2021 09:35), Max: 102.8 (2021 03:29)  HR: 130 (2021 10:08) (103 - 130)  BP: 114/76 (2021 10:07) (113/62 - 121/75)  BP(mean): --  ABP: --  ABP(mean): --  RR: 18 (2021 09:35) (18 - 20)  SpO2: 97% (2021 10:08) (94% - 100%)        06- @ 07:01  -  -07 @ 07:00  --------------------------------------------------------  IN: 720 mL / OUT: 400 mL / NET: 320 mL      CAPILLARY BLOOD GLUCOSE      POCT Blood Glucose.: 290 mg/dL (2021 13:00)      PHYSICAL EXAM:  General: Severe respiratory failure  Neurology: A&Ox3, nonfocal, YU x 4  Eyes: PERRLA/ EOMI, Gross vision intact  ENT/Neck: Neck supple, trachea midline, No JVD, Gross hearing intact  Respiratory: bilateral rales. no wheezing  CV: +tachycardia, +S1/S2, -S3/S4, +murmurs. No rubs or gallops  Abdominal: Soft, NT, ND +BS, No HSM  MSK: 5/5 strength UE/LE bilaterally  Extremities: +edema, 2+ peripheral pulses  Skin: No Rashes, Hematoma, Ecchymosis      HOSPITAL MEDICATIONS:  MEDICATIONS  (STANDING):  allopurinol 300 milliGRAM(s) Oral every 12 hours  artificial tears (preservative free) Ophthalmic Solution 1 Drop(s) Both EYES two times a day  atorvastatin 80 milliGRAM(s) Oral at bedtime  dextrose 40% Gel 15 Gram(s) Oral once  dextrose 50% Injectable 25 Gram(s) IV Push once  dextrose 50% Injectable 12.5 Gram(s) IV Push once  dextrose 50% Injectable 25 Gram(s) IV Push once  glucagon  Injectable 1 milliGRAM(s) IntraMuscular once  heparin   Injectable 5000 Unit(s) IV Push once  insulin glargine Injectable (LANTUS) 17 Unit(s) SubCutaneous at bedtime  insulin lispro (ADMELOG) corrective regimen sliding scale   SubCutaneous three times a day before meals  insulin lispro (ADMELOG) corrective regimen sliding scale   SubCutaneous at bedtime  insulin lispro Injectable (ADMELOG) 9 Unit(s) SubCutaneous three times a day before meals  lidocaine 2% Injectable 20 milliLiter(s) Local Injection once  melatonin 3 milliGRAM(s) Oral at bedtime  metoprolol tartrate 25 milliGRAM(s) Oral two times a day  pantoprazole  Injectable 40 milliGRAM(s) IV Push two times a day  piperacillin/tazobactam IVPB.. 3.375 Gram(s) IV Intermittent every 8 hours  polyethylene glycol 3350 17 Gram(s) Oral daily  senna 2 Tablet(s) Oral at bedtime  simethicone 80 milliGRAM(s) Chew three times a day    MEDICATIONS  (PRN):  acetaminophen   Tablet .. 650 milliGRAM(s) Oral every 6 hours PRN Mild Pain (1 - 3), Moderate Pain (4 - 6)  albuterol/ipratropium for Nebulization 3 milliLiter(s) Nebulizer every 6 hours PRN Shortness of Breath and/or Wheezing  metoprolol tartrate Injectable 5 milliGRAM(s) IV Push every 6 hours PRN HR > 110  traMADol 25 milliGRAM(s) Oral every 6 hours PRN Moderate Pain (4 - 6)      LABS:                        8.1    24.48 )-----------( 23       ( 2021 06:19 )             25.0     06-07    129<L>  |  96  |  22  ----------------------------<  234<H>  4.3   |  18<L>  |  1.06    Ca    8.7      2021 06:20  Phos  3.5     06-07  Mg     2.4     06-07    TPro  8.1  /  Alb  3.0<L>  /  TBili  1.8<H>  /  DBili  x   /  AST  39  /  ALT  24  /  AlkPhos  87  06-07    PT/INR - ( 2021 06:19 )   PT: 16.6 sec;   INR: 1.41 ratio         PTT - ( 2021 06:19 )  PTT:29.6 sec  Urinalysis Basic - ( 2021 18:12 )    Color: Pale Yellow / Appearance: Clear / S.015 / pH: x  Gluc: x / Ketone: Small  / Bili: Negative / Urobili: Negative   Blood: x / Protein: 30 mg/dL / Nitrite: Negative   Leuk Esterase: Trace / RBC: 0-2 /HPF / WBC 11-25   Sq Epi: x / Non Sq Epi: Few / Bacteria: Occasional        Venous Blood Gas:   @ 06:24  7.42/35/33/22/58  VBG Lactate: 4.6  Venous Blood Gas:   @ 00:41  7.36/43/29/24/46  VBG Lactate: 2.9      MICROBIOLOGY:     RADIOLOGY:  [x] Reviewed by me    CXR:   IMPRESSION:    The heart is enlarged. Pulmonary edema. No pleural effusion. No pneumothorax. No acute bony pathology could be identified.

## 2021-06-07 NOTE — CHART NOTE - NSCHARTNOTEFT_GEN_A_CORE
MICU Accept Note    HPI / INTERVAL HISTORY:    Pt was evaluated prior to midnight on 6/5/21.  Hx obtained form patient, chart review, son Boston and daughter Rc Matteo at bedside. Patient Lithuanian speaking. Translation provided by Lithuanian speaking MD.     84 yo Vietnamese speaking female with a PMHx of CAD w/ TVD (cardiac cath 2019 in [place] revealed LAD 70-80%, OM1 80%,  RCA, family declined CABG at that time), HFpEF, severe AS, HTN, recently dx T2DM (A1C >9), chronic leukocytosis (WBC 13-14 in [place] in 2020), anemia/thrombocytopenia acutely worsening recently (plt 70s-->20s) w/ blasts, and GERD presenting as transfer from [facility] where she was seen for 5 days of substernal cp that radiates to left jaw, left arm and back w/ associated progressive SANON x 5 days. Found to be septic w/ Tmax 101, WBC 18, 7% bands, 10% blasts, lactate 4.7 --> 3, sinus tach 110 w/ PVCs w/ mildly positive UA (11-25 WBC, trace LE, occasional bacteria) and w/ elevated cardiac enzymes (TropI 0.494, CKMB 4.4, BNP 1790) w/o EKG changes c/f NSTEMI vs. demand ischemia. Evaluated by cardiology. Recommendation to hold hep gtt and DAPT due to thrombocytopenia and transfer to tertiary center for higher acuity of care. Also evaluated by critical care, found to have low EF and apical hypokinesis on bedside POCUS. Per son, no hx of cancer in patient or family. Per daughter, flow cytometry checked in [facility] in 2020 was inconclusive. Blasts noted on CBC within the last week only.     On presentation to [facility], tachypenic to 30. Noted to have wheezing. Temp 98.5,  (sinus tach on tele), 132/73, RR 30, 100% 2L NC    Patient seen and examined at bedside, noted to be wheezing and coughing with blood tinged white phlegm. SOB is improving with 2L NC. Endorses abd pain at sides of abd wall and mild back pain. No current cp. No fevers at home (low grade temp 99 day of presentation). No coughing prior to NS admission. +dysuria x 1 day, +nausea and vomiting x 1 this AM. Of note, patient on ASA and plavix at home, ASA only added within last week.     PV vitals: Tmax 101, , -130, 100% 2L NC   PV workup: CTA chest, abd pelvis neg for dissection, secretions in extrathoracic trachea, revealing eccentric wall thickening of the eso, further w/u to r/o malignancy recommended, moderate SMA stenosis   PV course: s/p vanc, zosyn x 1, 1L NS x 1      INTERVAL HX:    84 yo Vietnamese speaking female with a PMHx of CAD w/ TVD (cardiac cath 2019 in [place] revealed LAD 70-80%, OM1 80%,  RCA, family declined CABG at that time), HFpEF, severe AS, HTN, recently dx T2DM (A1C >9), chronic leukocytosis w/ blasts (WBC 13-14 in [place] in 2020), anemia, thrombocytopenia, and GERD presenting as transfer from [facility] where she was seen for 5 days of substernal cp that radiates to left jaw, left arm and back w/ associated progressive SANON x 5 days found to be septic possibly 2/2 UTI vs aspiration PNA given tracheal secretions on CTAP w/ elevated CE c/f NSTEMI transferred to [facility] for cardiology evaluation.     Admitted o/n. Consultants including cardiology, heme/onc, GI, ID, pulm, following. Extensive GOC performed by teams. Patient remains full code.  Since adm troponins uptrended from 400s to 1400s. On vbg, pH   RRT called for [     ]. Pt intubated on floors and transferred to MICU.    PAST MEDICAL & SURGICAL HISTORY:  Diabetes mellitus    CAD (coronary artery disease)    Chronic diastolic congestive heart failure    Severe aortic stenosis    Hypertension    No significant past surgical history      FAMILY HISTORY:  No pertinent family history in first degree relatives      Allergies    No Known Allergies    Intolerances        REVIEW OF SYSTEMS:  CONSTITUTIONAL: No weakness, fevers or chills  EYES/ENT: No visual changes;  No vertigo or throat pain   NECK: No pain or stiffness  RESPIRATORY: No cough, wheezing, hemoptysis; No shortness of breath  CARDIOVASCULAR: No chest pain or palpitations  GASTROINTESTINAL: No abdominal or epigastric pain. No nausea, vomiting, or hematemesis; No diarrhea or constipation. No melena or hematochezia.  GENITOURINARY: No dysuria, frequency or hematuria  NEUROLOGICAL: No numbness or weakness  All other review of systems is negative unless indicated above.    OBJECTIVE:  ICU Vital Signs Last 24 Hrs  T(C): 36.8 (07 Jun 2021 18:44), Max: 39.6 (07 Jun 2021 15:15)  T(F): 98.2 (07 Jun 2021 18:44), Max: 103.2 (07 Jun 2021 15:15)  HR: 132 (07 Jun 2021 18:44) (77 - 132)  BP: 94/62 (07 Jun 2021 18:44) (94/62 - 132/66)  BP(mean): --  ABP: --  ABP(mean): --  RR: 40 (07 Jun 2021 18:44) (18 - 40)  SpO2: 100% (07 Jun 2021 18:44) (94% - 100%)        06-06 @ 07:01  -  06-07 @ 07:00  --------------------------------------------------------  IN: 720 mL / OUT: 400 mL / NET: 320 mL      CAPILLARY BLOOD GLUCOSE      POCT Blood Glucose.: 218 mg/dL (07 Jun 2021 18:56)      PHYSICAL EXAM:  GENERAL: NAD, lying in bed comfortably  HEAD:  Atraumatic, Normocephalic  EYES: EOMI, PERRLA, conjunctiva and sclera clear  ENT: Moist mucous membranes  NECK: Supple, No JVD  CHEST/LUNG: Clear to auscultation bilaterally; No rales, rhonchi, wheezing, or rubs. Unlabored respirations   HEART: Regular rate and rhythm; No murmurs, rubs, or gallops  ABDOMEN: Bowel sounds present; Soft, Nontender, Nondistended. No hepatomegaly  EXTREMITIES:  2+ Peripheral Pulses, brisk capillary refill. No clubbing, cyanosis, or edema  NERVOUS SYSTEM:  Alert & Oriented X3, speech clear. No deficits   MSK: FROM all 4 extremities, full and equal strength  SKIN: No rashes or lesions    HOSPITAL MEDICATIONS:  MEDICATIONS  (STANDING):  albuterol/ipratropium for Nebulization 3 milliLiter(s) Nebulizer every 6 hours  allopurinol 300 milliGRAM(s) Oral every 12 hours  artificial tears (preservative free) Ophthalmic Solution 1 Drop(s) Both EYES two times a day  atorvastatin 80 milliGRAM(s) Oral at bedtime  dextrose 40% Gel 15 Gram(s) Oral once  dextrose 50% Injectable 25 Gram(s) IV Push once  dextrose 50% Injectable 12.5 Gram(s) IV Push once  dextrose 50% Injectable 25 Gram(s) IV Push once  glucagon  Injectable 1 milliGRAM(s) IntraMuscular once  heparin   Injectable 5000 Unit(s) IV Push once  insulin glargine Injectable (LANTUS) 17 Unit(s) SubCutaneous at bedtime  insulin lispro (ADMELOG) corrective regimen sliding scale   SubCutaneous three times a day before meals  insulin lispro (ADMELOG) corrective regimen sliding scale   SubCutaneous at bedtime  insulin lispro Injectable (ADMELOG) 9 Unit(s) SubCutaneous three times a day before meals  lidocaine 2% Injectable 20 milliLiter(s) Local Injection once  melatonin 3 milliGRAM(s) Oral at bedtime  metoprolol tartrate 25 milliGRAM(s) Oral two times a day  pantoprazole  Injectable 40 milliGRAM(s) IV Push two times a day  piperacillin/tazobactam IVPB.. 3.375 Gram(s) IV Intermittent every 8 hours  polyethylene glycol 3350 17 Gram(s) Oral daily  senna 2 Tablet(s) Oral at bedtime  simethicone 80 milliGRAM(s) Chew three times a day    MEDICATIONS  (PRN):  acetaminophen   Tablet .. 650 milliGRAM(s) Oral every 6 hours PRN Mild Pain (1 - 3), Moderate Pain (4 - 6)  metoprolol tartrate Injectable 5 milliGRAM(s) IV Push every 6 hours PRN HR > 110  traMADol 25 milliGRAM(s) Oral every 6 hours PRN Moderate Pain (4 - 6)      LABS:                        8.1    53.04 )-----------( 34       ( 07 Jun 2021 18:25 )             27.6     Hgb Trend: 8.1<--, 8.1<--, 7.8<--, 7.8<--, 8.8<--  06-07    130<L>  |  91<L>  |  30<H>  ----------------------------<  294<H>  4.6   |  <10<LL>  |  1.43<H>    Ca    9.2      07 Jun 2021 18:25  Phos  6.8     06-07  Mg     2.8     06-07    TPro  9.1<H>  /  Alb  3.2<L>  /  TBili  1.8<H>  /  DBili  x   /  AST  58<H>  /  ALT  25  /  AlkPhos  98  06-07    Creatinine Trend: 1.43<--, 1.06<--, 0.64<--, 0.91<--  PT/INR - ( 07 Jun 2021 06:19 )   PT: 16.6 sec;   INR: 1.41 ratio         PTT - ( 07 Jun 2021 06:19 )  PTT:29.6 sec      Venous Blood Gas:  06-07 @ 17:32  7.06/31/53/8/68  VBG Lactate: 18.0  Venous Blood Gas:  06-07 @ 06:24  7.42/35/33/22/58  VBG Lactate: 4.6  Venous Blood Gas:  06-06 @ 00:41  7.36/43/29/24/46  VBG Lactate: 2.9      ASSESSMENT AND PLAN:    Neuro  -    Cardiovascular  -     Pulmonary  -    FEN/GI  -    Renal  -      -    ID  -    Heme  -    Endo  - MICU Accept Note    HPI / INTERVAL HISTORY:    Pt was evaluated prior to midnight on 6/5/21.  Hx obtained form patient, chart review, son Boston and daughter Rc Matteo at bedside. Patient Hungarian speaking. Translation provided by Hungarian speaking MD.     86 yo Macedonian speaking female with a PMHx of CAD w/ TVD (cardiac cath 2019 in [place] revealed LAD 70-80%, OM1 80%,  RCA, family declined CABG at that time), HFpEF, severe AS, HTN, recently dx T2DM (A1C >9), chronic leukocytosis (WBC 13-14 in [place] in 2020), anemia/thrombocytopenia acutely worsening recently (plt 70s-->20s) w/ blasts, and GERD presenting as transfer from [facility] where she was seen for 5 days of substernal cp that radiates to left jaw, left arm and back w/ associated progressive SANON x 5 days. Found to be septic w/ Tmax 101, WBC 18, 7% bands, 10% blasts, lactate 4.7 --> 3, sinus tach 110 w/ PVCs w/ mildly positive UA (11-25 WBC, trace LE, occasional bacteria) and w/ elevated cardiac enzymes (TropI 0.494, CKMB 4.4, BNP 1790) w/o EKG changes c/f NSTEMI vs. demand ischemia. Evaluated by cardiology. Recommendation to hold hep gtt and DAPT due to thrombocytopenia and transfer to tertiary center for higher acuity of care. Also evaluated by critical care, found to have low EF and apical hypokinesis on bedside POCUS. Per son, no hx of cancer in patient or family. Per daughter, flow cytometry checked in [facility] in 2020 was inconclusive. Blasts noted on CBC within the last week only.     On presentation to [facility], tachypenic to 30. Noted to have wheezing. Temp 98.5,  (sinus tach on tele), 132/73, RR 30, 100% 2L NC    Patient seen and examined at bedside, noted to be wheezing and coughing with blood tinged white phlegm. SOB is improving with 2L NC. Endorses abd pain at sides of abd wall and mild back pain. No current cp. No fevers at home (low grade temp 99 day of presentation). No coughing prior to NS admission. +dysuria x 1 day, +nausea and vomiting x 1 this AM. Of note, patient on ASA and plavix at home, ASA only added within last week.     PV vitals: Tmax 101, , -130, 100% 2L NC   PV workup: CTA chest, abd pelvis neg for dissection, secretions in extrathoracic trachea, revealing eccentric wall thickening of the eso, further w/u to r/o malignancy recommended, moderate SMA stenosis   PV course: s/p vanc, zosyn x 1, 1L NS x 1      INTERVAL HX:    86 yo Macedonian speaking female with a PMHx of CAD w/ TVD (cardiac cath 2019 in [place] revealed LAD 70-80%, OM1 80%,  RCA, family declined CABG at that time), HFpEF, severe AS, HTN, recently dx T2DM (A1C >9), chronic leukocytosis w/ blasts (WBC 13-14 in [place] in 2020), anemia, thrombocytopenia, and GERD presenting as transfer from [facility] where she was seen for 5 days of substernal cp that radiates to left jaw, left arm and back w/ associated progressive SANON x 5 days found to be septic possibly 2/2 UTI vs aspiration PNA given tracheal secretions on CTAP w/ elevated CE c/f NSTEMI transferred to [facility] for cardiology evaluation.     Admitted on 6/5. Consultants including cardiology, heme/onc, GI, ID, pulm, following. Extensive GOC performed by teams. Patient remains full code.  Since adm troponins uptrended from 400s to 1400s. On vbg, pH decreased to 7.06, lactate 18.0 from 4.7.  RRT on 6/7 called for [     ]. Pt intubated on floors and transferred to MICU.    PAST MEDICAL & SURGICAL HISTORY:  Diabetes mellitus    CAD (coronary artery disease)    Chronic diastolic congestive heart failure    Severe aortic stenosis    Hypertension    No significant past surgical history      FAMILY HISTORY:  No pertinent family history in first degree relatives      Allergies    No Known Allergies    Intolerances      OBJECTIVE:  ICU Vital Signs Last 24 Hrs  T(C): 36.8 (07 Jun 2021 18:44), Max: 39.6 (07 Jun 2021 15:15)  T(F): 98.2 (07 Jun 2021 18:44), Max: 103.2 (07 Jun 2021 15:15)  HR: 132 (07 Jun 2021 18:44) (77 - 132)  BP: 94/62 (07 Jun 2021 18:44) (94/62 - 132/66)  BP(mean): --  ABP: --  ABP(mean): --  RR: 40 (07 Jun 2021 18:44) (18 - 40)  SpO2: 100% (07 Jun 2021 18:44) (94% - 100%)        06-06 @ 07:01  -  06-07 @ 07:00  --------------------------------------------------------  IN: 720 mL / OUT: 400 mL / NET: 320 mL      CAPILLARY BLOOD GLUCOSE      POCT Blood Glucose.: 218 mg/dL (07 Jun 2021 18:56)      PHYSICAL EXAM:  GENERAL: NAD, lying in bed comfortably  HEAD:  Atraumatic, Normocephalic  EYES: EOMI, PERRLA, conjunctiva and sclera clear  ENT: Moist mucous membranes  NECK: Supple, No JVD  CHEST/LUNG: Clear to auscultation bilaterally; No rales, rhonchi, wheezing, or rubs. Unlabored respirations   HEART: Regular rate and rhythm; No murmurs, rubs, or gallops  ABDOMEN: Bowel sounds present; Soft, Nontender, Nondistended. No hepatomegaly  EXTREMITIES:  2+ Peripheral Pulses, brisk capillary refill. No clubbing, cyanosis, or edema  NERVOUS SYSTEM:  Alert & Oriented X3, speech clear. No deficits   MSK: FROM all 4 extremities, full and equal strength  SKIN: No rashes or lesions    HOSPITAL MEDICATIONS:  MEDICATIONS  (STANDING):  albuterol/ipratropium for Nebulization 3 milliLiter(s) Nebulizer every 6 hours  allopurinol 300 milliGRAM(s) Oral every 12 hours  artificial tears (preservative free) Ophthalmic Solution 1 Drop(s) Both EYES two times a day  atorvastatin 80 milliGRAM(s) Oral at bedtime  dextrose 40% Gel 15 Gram(s) Oral once  dextrose 50% Injectable 25 Gram(s) IV Push once  dextrose 50% Injectable 12.5 Gram(s) IV Push once  dextrose 50% Injectable 25 Gram(s) IV Push once  glucagon  Injectable 1 milliGRAM(s) IntraMuscular once  heparin   Injectable 5000 Unit(s) IV Push once  insulin glargine Injectable (LANTUS) 17 Unit(s) SubCutaneous at bedtime  insulin lispro (ADMELOG) corrective regimen sliding scale   SubCutaneous three times a day before meals  insulin lispro (ADMELOG) corrective regimen sliding scale   SubCutaneous at bedtime  insulin lispro Injectable (ADMELOG) 9 Unit(s) SubCutaneous three times a day before meals  lidocaine 2% Injectable 20 milliLiter(s) Local Injection once  melatonin 3 milliGRAM(s) Oral at bedtime  metoprolol tartrate 25 milliGRAM(s) Oral two times a day  pantoprazole  Injectable 40 milliGRAM(s) IV Push two times a day  piperacillin/tazobactam IVPB.. 3.375 Gram(s) IV Intermittent every 8 hours  polyethylene glycol 3350 17 Gram(s) Oral daily  senna 2 Tablet(s) Oral at bedtime  simethicone 80 milliGRAM(s) Chew three times a day    MEDICATIONS  (PRN):  acetaminophen   Tablet .. 650 milliGRAM(s) Oral every 6 hours PRN Mild Pain (1 - 3), Moderate Pain (4 - 6)  metoprolol tartrate Injectable 5 milliGRAM(s) IV Push every 6 hours PRN HR > 110  traMADol 25 milliGRAM(s) Oral every 6 hours PRN Moderate Pain (4 - 6)      LABS:                        8.1    53.04 )-----------( 34       ( 07 Jun 2021 18:25 )             27.6     Hgb Trend: 8.1<--, 8.1<--, 7.8<--, 7.8<--, 8.8<--  06-07    130<L>  |  91<L>  |  30<H>  ----------------------------<  294<H>  4.6   |  <10<LL>  |  1.43<H>    Ca    9.2      07 Jun 2021 18:25  Phos  6.8     06-07  Mg     2.8     06-07    TPro  9.1<H>  /  Alb  3.2<L>  /  TBili  1.8<H>  /  DBili  x   /  AST  58<H>  /  ALT  25  /  AlkPhos  98  06-07    Creatinine Trend: 1.43<--, 1.06<--, 0.64<--, 0.91<--  PT/INR - ( 07 Jun 2021 06:19 )   PT: 16.6 sec;   INR: 1.41 ratio         PTT - ( 07 Jun 2021 06:19 )  PTT:29.6 sec      Venous Blood Gas:  06-07 @ 17:32  7.06/31/53/8/68  VBG Lactate: 18.0  Venous Blood Gas:  06-07 @ 06:24  7.42/35/33/22/58  VBG Lactate: 4.6  Venous Blood Gas:  06-06 @ 00:41  7.36/43/29/24/46  VBG Lactate: 2.9      ASSESSMENT AND PLAN:    86 yo Macedonian speaking female with a PMHx of CAD w/ TVD (cardiac cath 2019 in [place] revealed LAD 70-80%, OM1 80%,  RCA, family declined CABG at that time), HFpEF, severe AS, HTN, recently dx T2DM (A1C >9), chronic leukocytosis w/ blasts (WBC 13-14 in [place] in 2020), anemia, thrombocytopenia, and GERD presenting as transfer from [facility] where she was seen for 5 days of substernal cp that radiates to left jaw, left arm and back w/ associated progressive SANON x 5 days found to be septic possibly 2/2 UTI vs aspiration PNA given tracheal secretions on CTAP w/ elevated CE c/f NSTEMI transferred to [facility] for cardiology evaluation.     NOTE INCOMPLETE    Neuro  -    Cardiovascular  -     Pulmonary  -    FEN/GI  -    Renal  -      -    ID  -    Heme  -    Endo  - MICU Accept Note    HPI / INTERVAL HISTORY:    Pt was evaluated prior to midnight on 6/5/21.  Hx obtained form patient, chart review, son Boston and daughter  Matteo at bedside. Patient English speaking. Translation provided by English speaking MD.     86 yo Japanese speaking female with a PMHx of CAD w/ TVD (cardiac cath 2019 in [place] revealed LAD 70-80%, OM1 80%,  RCA, family declined CABG at that time), HFpEF, severe AS, HTN, recently dx T2DM (A1C >9), chronic leukocytosis (WBC 13-14 in [place] in 2020), anemia/thrombocytopenia acutely worsening recently (plt 70s-->20s) w/ blasts, and GERD presenting as transfer from [facility] where she was seen for 5 days of substernal cp that radiates to left jaw, left arm and back w/ associated progressive SANON x 5 days. Found to be septic w/ Tmax 101, WBC 18, 7% bands, 10% blasts, lactate 4.7 --> 3, sinus tach 110 w/ PVCs w/ mildly positive UA (11-25 WBC, trace LE, occasional bacteria) and w/ elevated cardiac enzymes (TropI 0.494, CKMB 4.4, BNP 1790) w/o EKG changes c/f NSTEMI vs. demand ischemia. Evaluated by cardiology. Recommendation to hold hep gtt and DAPT due to thrombocytopenia and transfer to tertiary center for higher acuity of care. Also evaluated by critical care, found to have low EF and apical hypokinesis on bedside POCUS. Per son, no hx of cancer in patient or family. Per daughter, flow cytometry checked in [facility] in 2020 was inconclusive. Blasts noted on CBC within the last week only.     Patient was f    PAST MEDICAL & SURGICAL HISTORY:  Diabetes mellitus    CAD (coronary artery disease)    Chronic diastolic congestive heart failure    Severe aortic stenosis    Hypertension    No significant past surgical history      FAMILY HISTORY:  No pertinent family history in first degree relatives      Allergies    No Known Allergies    Intolerances      OBJECTIVE:  ICU Vital Signs Last 24 Hrs  T(C): 36.8 (07 Jun 2021 18:44), Max: 39.6 (07 Jun 2021 15:15)  T(F): 98.2 (07 Jun 2021 18:44), Max: 103.2 (07 Jun 2021 15:15)  HR: 132 (07 Jun 2021 18:44) (77 - 132)  BP: 94/62 (07 Jun 2021 18:44) (94/62 - 132/66)  BP(mean): --  ABP: --  ABP(mean): --  RR: 40 (07 Jun 2021 18:44) (18 - 40)  SpO2: 100% (07 Jun 2021 18:44) (94% - 100%)        06-06 @ 07:01  -  06-07 @ 07:00  --------------------------------------------------------  IN: 720 mL / OUT: 400 mL / NET: 320 mL      CAPILLARY BLOOD GLUCOSE      POCT Blood Glucose.: 218 mg/dL (07 Jun 2021 18:56)      PHYSICAL EXAM:  GENERAL: NAD, lying in bed comfortably  HEAD:  Atraumatic, Normocephalic  EYES: EOMI, PERRLA, conjunctiva and sclera clear  ENT: Moist mucous membranes  NECK: Supple, No JVD  CHEST/LUNG: Clear to auscultation bilaterally; No rales, rhonchi, wheezing, or rubs. Unlabored respirations   HEART: Regular rate and rhythm; No murmurs, rubs, or gallops  ABDOMEN: Bowel sounds present; Soft, Nontender, Nondistended. No hepatomegaly  EXTREMITIES:  2+ Peripheral Pulses, brisk capillary refill. No clubbing, cyanosis, or edema  NERVOUS SYSTEM:  Alert & Oriented X3, speech clear. No deficits   MSK: FROM all 4 extremities, full and equal strength  SKIN: No rashes or lesions    HOSPITAL MEDICATIONS:  MEDICATIONS  (STANDING):  albuterol/ipratropium for Nebulization 3 milliLiter(s) Nebulizer every 6 hours  allopurinol 300 milliGRAM(s) Oral every 12 hours  artificial tears (preservative free) Ophthalmic Solution 1 Drop(s) Both EYES two times a day  atorvastatin 80 milliGRAM(s) Oral at bedtime  dextrose 40% Gel 15 Gram(s) Oral once  dextrose 50% Injectable 25 Gram(s) IV Push once  dextrose 50% Injectable 12.5 Gram(s) IV Push once  dextrose 50% Injectable 25 Gram(s) IV Push once  glucagon  Injectable 1 milliGRAM(s) IntraMuscular once  heparin   Injectable 5000 Unit(s) IV Push once  insulin glargine Injectable (LANTUS) 17 Unit(s) SubCutaneous at bedtime  insulin lispro (ADMELOG) corrective regimen sliding scale   SubCutaneous three times a day before meals  insulin lispro (ADMELOG) corrective regimen sliding scale   SubCutaneous at bedtime  insulin lispro Injectable (ADMELOG) 9 Unit(s) SubCutaneous three times a day before meals  lidocaine 2% Injectable 20 milliLiter(s) Local Injection once  melatonin 3 milliGRAM(s) Oral at bedtime  metoprolol tartrate 25 milliGRAM(s) Oral two times a day  pantoprazole  Injectable 40 milliGRAM(s) IV Push two times a day  piperacillin/tazobactam IVPB.. 3.375 Gram(s) IV Intermittent every 8 hours  polyethylene glycol 3350 17 Gram(s) Oral daily  senna 2 Tablet(s) Oral at bedtime  simethicone 80 milliGRAM(s) Chew three times a day    MEDICATIONS  (PRN):  acetaminophen   Tablet .. 650 milliGRAM(s) Oral every 6 hours PRN Mild Pain (1 - 3), Moderate Pain (4 - 6)  metoprolol tartrate Injectable 5 milliGRAM(s) IV Push every 6 hours PRN HR > 110  traMADol 25 milliGRAM(s) Oral every 6 hours PRN Moderate Pain (4 - 6)      LABS:                        8.1    53.04 )-----------( 34       ( 07 Jun 2021 18:25 )             27.6     Hgb Trend: 8.1<--, 8.1<--, 7.8<--, 7.8<--, 8.8<--  06-07    130<L>  |  91<L>  |  30<H>  ----------------------------<  294<H>  4.6   |  <10<LL>  |  1.43<H>    Ca    9.2      07 Jun 2021 18:25  Phos  6.8     06-07  Mg     2.8     06-07    TPro  9.1<H>  /  Alb  3.2<L>  /  TBili  1.8<H>  /  DBili  x   /  AST  58<H>  /  ALT  25  /  AlkPhos  98  06-07    Creatinine Trend: 1.43<--, 1.06<--, 0.64<--, 0.91<--  PT/INR - ( 07 Jun 2021 06:19 )   PT: 16.6 sec;   INR: 1.41 ratio         PTT - ( 07 Jun 2021 06:19 )  PTT:29.6 sec      Venous Blood Gas:  06-07 @ 17:32  7.06/31/53/8/68  VBG Lactate: 18.0  Venous Blood Gas:  06-07 @ 06:24  7.42/35/33/22/58  VBG Lactate: 4.6  Venous Blood Gas:  06-06 @ 00:41  7.36/43/29/24/46  VBG Lactate: 2.9      ASSESSMENT AND PLAN:    86 yo Japanese speaking female with a PMHx of CAD w/ TVD (cardiac cath 2019 in [place] revealed LAD 70-80%, OM1 80%,  RCA, family declined CABG at that time), HFpEF, severe AS, HTN, recently dx T2DM (A1C >9), chronic leukocytosis w/ blasts (WBC 13-14 in [place] in 2020), anemia, thrombocytopenia, and GERD presenting as transfer from [facility] where she was seen for 5 days of substernal cp that radiates to left jaw, left arm and back w/ associated progressive SANON x 5 days found to be septic possibly 2/2 UTI vs aspiration PNA given tracheal secretions on CTAP w/ elevated CE c/f NSTEMI transferred to [facility] for cardiology evaluation.     NOTE INCOMPLETE    Neuro  -    Cardiovascular  -     Pulmonary  -    FEN/GI  -    Renal  -      -    ID  -    Heme  -    Endo  - MICU Accept Note    HPI / INTERVAL HISTORY:    86 yo Armenian speaking female with a PMHx of CAD w/ TVD (cardiac cath 2019 in [place] revealed LAD 70-80%, OM1 80%,  RCA, family declined CABG at that time), HFpEF, severe AS, HTN, recently dx T2DM (A1C >9), chronic leukocytosis (WBC 13-14 in [place] in ), anemia/thrombocytopenia acutely worsening recently (plt 70s-->20s) w/ blasts, and GERD presenting as transfer from [facility] where she was seen for 5 days of substernal cp that radiates to left jaw, left arm and back w/ associated progressive SANON x 5 days. Found to be septic w/ Tmax 101, WBC 18, 7% bands, 10% blasts, lactate 4.7 --> 3, sinus tach 110 w/ PVCs w/ mildly positive UA (11-25 WBC, trace LE, occasional bacteria) and w/ elevated cardiac enzymes (TropI 0.494, CKMB 4.4, BNP 1790) w/o EKG changes c/f NSTEMI vs. demand ischemia. Evaluated by cardiology. Recommendation to hold hep gtt and DAPT due to thrombocytopenia and transfer to tertiary center for higher acuity of care. Also evaluated by critical care, found to have low EF and apical hypokinesis on bedside POCUS. Per son, no hx of cancer in patient or family. Per daughter, flow cytometry checked in [facility] in  was inconclusive. Blasts noted on CBC within the last week only.     While patient was in the hospital, she was seen by cardiology, structural heart (for NSTEMI and aortic stenosis), heme/onc (for evaluation of potential hematologic malignancy), ID (for sepsis). Per cardiology, DAPT that patient was on for CAD was discontinued in light of worsening thrombocytopenia and hemoptysis patient was previously experiencing, despite knowing that pait. Furthermore, for this reason patient was not a cath candidate to to inability to restart DAPT in this setting if patient were to receive AC/DAPT. Per structural heart, it is unlikely that patient would be a candidate for TAVR based on all her active issues pending formal echo. Bone marrow biopsy performed  by heme/onc. ID following for sepsis.     MICU consulted for patient's worsening respiratory failure this afternoon. Patient was on BiPAP on arrival from [place] yesterday but was weaned to 2L NC. This afternoon patient developed increasing WOB and respiratory distress that persisted despite BiPAP. Patient was found to have a worsening WBC count to 59, worsening MARINA ( 1.4 <- 1.0 <- 0.6 ), and significant lactic acidosis with bicarb <10 and lactate of 18. CXR showed new pulmonary edema with minimal urine output despite lasix. Extensive GOC conversations had with family. Decision was made to proceed with full code status with family understanding that patient is likely to , and patient was started on levophed 2/2 hypotension, intubated for AMS and increased WOB, and brought to MICU.       PAST MEDICAL & SURGICAL HISTORY:  Diabetes mellitus    CAD (coronary artery disease)    Chronic diastolic congestive heart failure    Severe aortic stenosis    Hypertension    No significant past surgical history      FAMILY HISTORY:  No pertinent family history in first degree relatives      Allergies    No Known Allergies    Intolerances      OBJECTIVE:  ICU Vital Signs Last 24 Hrs  T(C): 36.8 (2021 18:44), Max: 39.6 (2021 15:15)  T(F): 98.2 (2021 18:44), Max: 103.2 (2021 15:15)  HR: 132 (2021 18:44) (77 - 132)  BP: 94/62 (2021 18:44) (94/62 - 132/66)  BP(mean): --  ABP: --  ABP(mean): --  RR: 40 (2021 18:44) (18 - 40)  SpO2: 100% (2021 18:44) (94% - 100%)        06-06 @ 07:01  -  -07 @ 07:00  --------------------------------------------------------  IN: 720 mL / OUT: 400 mL / NET: 320 mL      CAPILLARY BLOOD GLUCOSE      POCT Blood Glucose.: 218 mg/dL (2021 18:56)      PHYSICAL EXAM:  GENERAL: Significcant tachypnea and repsiratory distress on BiPAP  HEAD:  Atraumatic, Normocephalic  EYES: EOMI, PERRLA, conjunctiva and sclera clear  ENT: Moist mucous membranes  CHEST/LUNG: Crackles bilaterally.   HEART: tachycardic, regular  ABDOMEN: Bowel sounds present; Soft, Nontender, Nondistended  EXTREMITIES:  Cool extremities  NERVOUS SYSTEM: AMS, indicating to family that she is thirsty and hungry.   SKIN: No rashes or lesions    HOSPITAL MEDICATIONS:  MEDICATIONS  (STANDING):  albuterol/ipratropium for Nebulization 3 milliLiter(s) Nebulizer every 6 hours  allopurinol 300 milliGRAM(s) Oral every 12 hours  artificial tears (preservative free) Ophthalmic Solution 1 Drop(s) Both EYES two times a day  atorvastatin 80 milliGRAM(s) Oral at bedtime  dextrose 40% Gel 15 Gram(s) Oral once  dextrose 50% Injectable 25 Gram(s) IV Push once  dextrose 50% Injectable 12.5 Gram(s) IV Push once  dextrose 50% Injectable 25 Gram(s) IV Push once  glucagon  Injectable 1 milliGRAM(s) IntraMuscular once  heparin   Injectable 5000 Unit(s) IV Push once  insulin glargine Injectable (LANTUS) 17 Unit(s) SubCutaneous at bedtime  insulin lispro (ADMELOG) corrective regimen sliding scale   SubCutaneous three times a day before meals  insulin lispro (ADMELOG) corrective regimen sliding scale   SubCutaneous at bedtime  insulin lispro Injectable (ADMELOG) 9 Unit(s) SubCutaneous three times a day before meals  lidocaine 2% Injectable 20 milliLiter(s) Local Injection once  melatonin 3 milliGRAM(s) Oral at bedtime  metoprolol tartrate 25 milliGRAM(s) Oral two times a day  pantoprazole  Injectable 40 milliGRAM(s) IV Push two times a day  piperacillin/tazobactam IVPB.. 3.375 Gram(s) IV Intermittent every 8 hours  polyethylene glycol 3350 17 Gram(s) Oral daily  senna 2 Tablet(s) Oral at bedtime  simethicone 80 milliGRAM(s) Chew three times a day    MEDICATIONS  (PRN):  acetaminophen   Tablet .. 650 milliGRAM(s) Oral every 6 hours PRN Mild Pain (1 - 3), Moderate Pain (4 - 6)  metoprolol tartrate Injectable 5 milliGRAM(s) IV Push every 6 hours PRN HR > 110  traMADol 25 milliGRAM(s) Oral every 6 hours PRN Moderate Pain (4 - 6)      LABS:                        8.1    53.04 )-----------( 34       ( 2021 18:25 )             27.6     Hgb Trend: 8.1<--, 8.1<--, 7.8<--, 7.8<--, 8.8<--  06-07    130<L>  |  91<L>  |  30<H>  ----------------------------<  294<H>  4.6   |  <10<LL>  |  1.43<H>    Ca    9.2      2021 18:25  Phos  6.8     0607  Mg     2.8     0607    TPro  9.1<H>  /  Alb  3.2<L>  /  TBili  1.8<H>  /  DBili  x   /  AST  58<H>  /  ALT  25  /  AlkPhos  98  07    Creatinine Trend: 1.43<--, 1.06<--, 0.64<--, 0.91<--  PT/INR - ( 2021 06:19 )   PT: 16.6 sec;   INR: 1.41 ratio         PTT - ( 2021 06:19 )  PTT:29.6 sec      Venous Blood Gas:   @ 17:32  7.06/31/53/8/68  VBG Lactate: 18.0  Venous Blood Gas:   @ 06:24  7.42/35/33/22/58  VBG Lactate: 4.6  Venous Blood Gas:   @ 00:41  7.36/43/29/24/46  VBG Lactate: 2.9      ASSESSMENT AND PLAN:    86 yo Armenian speaking female with a PMHx of CAD w/ TVD (cardiac cath 2019 in [place] revealed LAD 70-80%, OM1 80%,  RCA, family declined CABG at that time), HFpEF, severe AS, HTN, recently dx T2DM (A1C >9), chronic leukocytosis w/ blasts (WBC 13-14 in [place] in ), anemia, thrombocytopenia, and GERD presenting as transfer from [facility] where she was seen for 5 days of substernal cp that radiates to left jaw, left arm and back w/ associated progressive SANON x 5 days found to be septic possibly 2/2 UTI vs aspiration PNA given tracheal secretions on CTAP w/ elevated CE c/f NSTEMI transferred to [facility] for cardiology evaluation now admitted to MICU with respiratory failure 2/2 pulmonary edema intubated, septic shock with unclear source with increasing WBC count on levophed, NSTEMI and cardiogenic shock with increasing troponin leak.       Neuro  #Sedation   - Currently on propofol gtt for sedation while intubated   - Mental status prior to intubation was good with patient communicative albeit lethargic   - Wean sedation as tolerated      Cardiovascular  #Shock   - Ddx cardiogenic vs septic shock requiring levophed for support   - Will avoid diuresis, patient does not appear systemically volume overloaded and is likely preload dependant given severe AS.     #NSTEMI   - DAPT being held in setting of thrombocytopenia  - cardiology following, no cath or intervention at this time   - echo performed , results pending    Pulmonary  #Hypoxic respiratory failure   - currently intubated ( - ) for increased WOB on BiPAP and worsening AMS  - 2/2 pulmonary edema in setting of severe LV dysfunction, and possible PNA    FEN/GI  # Nutrition   - trickle feeds while on vent   - pantoprazole 40 qday     Renal  #MARINA   - likely prerenal etiology (hypoperfusion 2/2 worsening HF)   - Cr uptrending (1.4 <- 1.0 <- 0.6)   - alejo in placed   - closely monitor I/O     #Metabolic acidosis   - pH 7.06 with HCO3 9 on VBG with anion gap 33  - likely 2/2 lactic acidosis @ 18   - patient currently intubated on pressors, will trend     ID  #Fevers/ Sepsis   - previous ddx included PNA (albeit CT Chest negative) and UTI ( UCx negative)   - fevers potentially 2/2 malignancy   - WBC dramatically uptrending (53 <- 24 over 12hrs)   - currently on zosyn (  - )  - vancomycin 1g given  for worsening clinical course/shock/rising WBC count   - MRSA PCR ordered   - BCx and UCx  negative   - rpt BCx sent  given worsening clinical course   - ID following     Heme  # Leukocytosis   - WBC dramatically uptrending (53 <- 24 over 12hrs)  - per heme/onc suspect underlying high grade MDS or AML  - s/p bone marrow Bx    - allopurinol for TLS prophylaxis     # DVT ppx   - SCDs  - heparin SC held 2/2 platelets     Endo  #T2DM   - lantus 17U qhs, previously on humalog 9U TID premeal   - ISS     GOC   - extensive GOC had with family. Aware of poor prognosis but would like patient full code. Family of 8 physician children. Dr Salas Yarbrough @ [phone] MICU Accept Note    HPI / INTERVAL HISTORY:    86 yo Estonian speaking female with a PMHx of CAD w/ TVD (cardiac cath 2019 in [place] revealed LAD 70-80%, OM1 80%,  RCA, family declined CABG at that time), HFpEF, severe AS, HTN, recently dx T2DM (A1C >9), chronic leukocytosis (WBC 13-14 in [place] in ), anemia/thrombocytopenia acutely worsening recently (plt 70s-->20s) w/ blasts, and GERD presenting as transfer from [facility] where she was seen for 5 days of substernal cp that radiates to left jaw, left arm and back w/ associated progressive SANON x 5 days. Found to be septic w/ Tmax 101, WBC 18, 7% bands, 10% blasts, lactate 4.7 --> 3, sinus tach 110 w/ PVCs w/ mildly positive UA (11-25 WBC, trace LE, occasional bacteria) and w/ elevated cardiac enzymes (TropI 0.494, CKMB 4.4, BNP 1790) w/o EKG changes c/f NSTEMI vs. demand ischemia. Evaluated by cardiology. Recommendation to hold hep gtt and DAPT due to thrombocytopenia and transfer to tertiary center for higher acuity of care. Also evaluated by critical care, found to have low EF and apical hypokinesis on bedside POCUS. Per son, no hx of cancer in patient or family. Per daughter, flow cytometry checked in [facility] in  was inconclusive. Blasts noted on CBC within the last week only.     While patient was in the hospital, she was seen by cardiology, structural heart (for NSTEMI and aortic stenosis), heme/onc (for evaluation of potential hematologic malignancy), ID (for sepsis). Per cardiology, DAPT that patient was on for CAD was discontinued in light of worsening thrombocytopenia and hemoptysis patient was previously experiencing, despite knowing that pait. Furthermore, for this reason patient was not a cath candidate to to inability to restart DAPT in this setting if patient were to receive AC/DAPT. Per structural heart, it is unlikely that patient would be a candidate for TAVR based on all her active issues pending formal echo. Bone marrow biopsy performed  by heme/onc. ID following for sepsis.     MICU consulted for patient's worsening respiratory failure this afternoon. Patient was on BiPAP on arrival from [place] yesterday but was weaned to 2L NC. This afternoon patient developed increasing WOB and respiratory distress that persisted despite BiPAP. Patient was found to have a worsening WBC count to 59, worsening MARINA ( 1.4 <- 1.0 <- 0.6 ), and significant lactic acidosis with bicarb <10 and lactate of 18. CXR showed new pulmonary edema with minimal urine output despite lasix. Extensive GOC conversations had with family. Decision was made to proceed with full code status with family understanding that patient is likely to , and patient was started on levophed 2/2 hypotension, intubated for AMS and increased WOB, and brought to MICU.       PAST MEDICAL & SURGICAL HISTORY:  Diabetes mellitus    CAD (coronary artery disease)    Chronic diastolic congestive heart failure    Severe aortic stenosis    Hypertension    No significant past surgical history      FAMILY HISTORY:  No pertinent family history in first degree relatives      Allergies    No Known Allergies    Intolerances      OBJECTIVE:  ICU Vital Signs Last 24 Hrs  T(C): 36.8 (2021 18:44), Max: 39.6 (2021 15:15)  T(F): 98.2 (2021 18:44), Max: 103.2 (2021 15:15)  HR: 132 (2021 18:44) (77 - 132)  BP: 94/62 (2021 18:44) (94/62 - 132/66)  BP(mean): --  ABP: --  ABP(mean): --  RR: 40 (2021 18:44) (18 - 40)  SpO2: 100% (2021 18:44) (94% - 100%)        06-06 @ 07:01  -  -07 @ 07:00  --------------------------------------------------------  IN: 720 mL / OUT: 400 mL / NET: 320 mL      CAPILLARY BLOOD GLUCOSE      POCT Blood Glucose.: 218 mg/dL (2021 18:56)      PHYSICAL EXAM:  GENERAL: Significcant tachypnea and repsiratory distress on BiPAP  HEAD:  Atraumatic, Normocephalic  EYES: EOMI, PERRLA, conjunctiva and sclera clear  ENT: Moist mucous membranes  CHEST/LUNG: Crackles bilaterally.   HEART: tachycardic, regular  ABDOMEN: Bowel sounds present; Soft, Nontender, Nondistended  EXTREMITIES:  Cool extremities  NERVOUS SYSTEM: AMS, indicating to family that she is thirsty and hungry.   SKIN: No rashes or lesions    HOSPITAL MEDICATIONS:  MEDICATIONS  (STANDING):  albuterol/ipratropium for Nebulization 3 milliLiter(s) Nebulizer every 6 hours  allopurinol 300 milliGRAM(s) Oral every 12 hours  artificial tears (preservative free) Ophthalmic Solution 1 Drop(s) Both EYES two times a day  atorvastatin 80 milliGRAM(s) Oral at bedtime  dextrose 40% Gel 15 Gram(s) Oral once  dextrose 50% Injectable 25 Gram(s) IV Push once  dextrose 50% Injectable 12.5 Gram(s) IV Push once  dextrose 50% Injectable 25 Gram(s) IV Push once  glucagon  Injectable 1 milliGRAM(s) IntraMuscular once  heparin   Injectable 5000 Unit(s) IV Push once  insulin glargine Injectable (LANTUS) 17 Unit(s) SubCutaneous at bedtime  insulin lispro (ADMELOG) corrective regimen sliding scale   SubCutaneous three times a day before meals  insulin lispro (ADMELOG) corrective regimen sliding scale   SubCutaneous at bedtime  insulin lispro Injectable (ADMELOG) 9 Unit(s) SubCutaneous three times a day before meals  lidocaine 2% Injectable 20 milliLiter(s) Local Injection once  melatonin 3 milliGRAM(s) Oral at bedtime  metoprolol tartrate 25 milliGRAM(s) Oral two times a day  pantoprazole  Injectable 40 milliGRAM(s) IV Push two times a day  piperacillin/tazobactam IVPB.. 3.375 Gram(s) IV Intermittent every 8 hours  polyethylene glycol 3350 17 Gram(s) Oral daily  senna 2 Tablet(s) Oral at bedtime  simethicone 80 milliGRAM(s) Chew three times a day    MEDICATIONS  (PRN):  acetaminophen   Tablet .. 650 milliGRAM(s) Oral every 6 hours PRN Mild Pain (1 - 3), Moderate Pain (4 - 6)  metoprolol tartrate Injectable 5 milliGRAM(s) IV Push every 6 hours PRN HR > 110  traMADol 25 milliGRAM(s) Oral every 6 hours PRN Moderate Pain (4 - 6)      LABS:                        8.1    53.04 )-----------( 34       ( 2021 18:25 )             27.6     Hgb Trend: 8.1<--, 8.1<--, 7.8<--, 7.8<--, 8.8<--  06-07    130<L>  |  91<L>  |  30<H>  ----------------------------<  294<H>  4.6   |  <10<LL>  |  1.43<H>    Ca    9.2      2021 18:25  Phos  6.8     0607  Mg     2.8     0607    TPro  9.1<H>  /  Alb  3.2<L>  /  TBili  1.8<H>  /  DBili  x   /  AST  58<H>  /  ALT  25  /  AlkPhos  98  07    Creatinine Trend: 1.43<--, 1.06<--, 0.64<--, 0.91<--  PT/INR - ( 2021 06:19 )   PT: 16.6 sec;   INR: 1.41 ratio         PTT - ( 2021 06:19 )  PTT:29.6 sec      Venous Blood Gas:   @ 17:32  7.06/31/53/8/68  VBG Lactate: 18.0  Venous Blood Gas:   @ 06:24  7.42/35/33/22/58  VBG Lactate: 4.6  Venous Blood Gas:   @ 00:41  7.36/43/29/24/46  VBG Lactate: 2.9      ASSESSMENT AND PLAN:    86 yo Estonian speaking female with a PMHx of CAD w/ TVD (cardiac cath 2019 in [place] revealed LAD 70-80%, OM1 80%,  RCA, family declined CABG at that time), HFpEF, severe AS, HTN, recently dx T2DM (A1C >9), chronic leukocytosis w/ blasts (WBC 13-14 in [place] in ), anemia, thrombocytopenia, and GERD presenting as transfer from [facility] where she was seen for 5 days of substernal cp that radiates to left jaw, left arm and back w/ associated progressive SANON x 5 days found to be septic possibly 2/2 UTI vs aspiration PNA given tracheal secretions on CTAP w/ elevated CE c/f NSTEMI transferred to [facility] for cardiology evaluation now admitted to MICU with respiratory failure 2/2 pulmonary edema intubated, septic shock with unclear source with increasing WBC count on levophed, NSTEMI and cardiogenic shock with increasing troponin leak.       Neuro  #Sedation   - Currently on propofol gtt for sedation while intubated   - Mental status prior to intubation was good with patient communicative albeit lethargic   - Wean sedation as tolerated      Cardiovascular  #Shock   - Ddx cardiogenic vs septic shock requiring levophed for support   - Will avoid diuresis, patient does not appear systemically volume overloaded and is likely preload dependant given severe AS.     #STEMI/NSTEMI   - DAPT being held in setting of thrombocytopenia  - now with avR ST elevation on EKG performed when patient in MICU   - trop uptrending (1417 <- 649)    - cardiology following, no cath or intervention at this time   - echo performed , results pending    Pulmonary  #Hypoxic respiratory failure   - currently intubated ( - ) for increased WOB on BiPAP and worsening AMS  - 2/2 pulmonary edema in setting of severe LV dysfunction, and possible PNA    FEN/GI  # Nutrition   - trickle feeds while on vent   - pantoprazole 40 qday     Renal  #MARINA   - likely prerenal etiology (hypoperfusion 2/2 worsening HF)   - Cr uptrending (1.4 <- 1.0 <- 0.6)   - alejo in placed   - closely monitor I/O     #Metabolic acidosis   - pH 7.06 with HCO3 9 on VBG with anion gap 33  - likely 2/2 lactic acidosis @ 18   - patient currently intubated on pressors, will trend     ID  #Fevers/ Sepsis   - previous ddx included PNA (albeit CT Chest negative) and UTI ( UCx negative)   - fevers potentially 2/2 malignancy   - WBC dramatically uptrending (53 <- 24 over 12hrs)   - currently on zosyn (  - )  - vancomycin 1g given  for worsening clinical course/shock/rising WBC count   - MRSA PCR ordered   - BCx and UCx  negative   - rpt BCx sent  given worsening clinical course   - ID following     Heme  # Leukocytosis   - WBC dramatically uptrending (53 <- 24 over 12hrs)  - per heme/onc suspect underlying high grade MDS or AML  - s/p bone marrow Bx    - allopurinol for TLS prophylaxis     # DVT ppx   - SCDs  - heparin SC held 2/2 platelets     Endo  #T2DM   - lantus 17U qhs, previously on humalog 9U TID premeal   - ISS     GOC   - extensive GOC had with family. Aware of poor prognosis but would like patient full code. Family of 8 physician children. Dr Salas Yarbrough @ [phone] MICU Accept Note    HPI / INTERVAL HISTORY:    84 yo Czech speaking female with a PMHx of CAD w/ TVD (cardiac cath 2019 in [place] revealed LAD 70-80%, OM1 80%,  RCA, family declined CABG at that time), HFpEF, severe AS, HTN, recently dx T2DM (A1C >9), chronic leukocytosis (WBC 13-14 in [place] in ), anemia/thrombocytopenia acutely worsening recently (plt 70s-->20s) w/ blasts, and GERD presenting as transfer from [facility] where she was seen for 5 days of substernal cp that radiates to left jaw, left arm and back w/ associated progressive SANON x 5 days. Found to be septic w/ Tmax 101, WBC 18, 7% bands, 10% blasts, lactate 4.7 --> 3, sinus tach 110 w/ PVCs w/ mildly positive UA (11-25 WBC, trace LE, occasional bacteria) and w/ elevated cardiac enzymes (TropI 0.494, CKMB 4.4, BNP 1790) w/o EKG changes c/f NSTEMI vs. demand ischemia. Evaluated by cardiology. Recommendation to hold hep gtt and DAPT due to thrombocytopenia and transfer to tertiary center for higher acuity of care. Also evaluated by critical care, found to have low EF and apical hypokinesis on bedside POCUS. Per son, no hx of cancer in patient or family. Per daughter, flow cytometry checked in [facility] in  was inconclusive. Blasts noted on CBC within the last week only.     While patient was in the hospital, she was seen by cardiology, structural heart (for NSTEMI and aortic stenosis), heme/onc (for evaluation of potential hematologic malignancy), ID (for sepsis). Per cardiology, DAPT that patient was on for CAD was discontinued in light of worsening thrombocytopenia and hemoptysis patient was previously experiencing, despite knowing that pait. Furthermore, for this reason patient was not a cath candidate to to inability to restart DAPT in this setting if patient were to receive AC/DAPT. Per structural heart, it is unlikely that patient would be a candidate for TAVR based on all her active issues pending formal echo. Bone marrow biopsy performed  by heme/onc. ID following for sepsis.     MICU consulted for patient's worsening respiratory failure this afternoon. Patient was on BiPAP on arrival from [place] yesterday but was weaned to 2L NC. This afternoon patient developed increasing WOB and respiratory distress that persisted despite BiPAP. Patient was found to have a worsening WBC count to 59, worsening MARINA ( 1.4 <- 1.0 <- 0.6 ), and significant lactic acidosis with bicarb <10 and lactate of 18. CXR showed new pulmonary edema with minimal urine output despite lasix. Extensive GOC conversations had with family. Decision was made to proceed with full code status with family understanding that patient is likely to , and patient was started on levophed 2/2 hypotension, intubated for AMS and increased WOB, and brought to MICU.       PAST MEDICAL & SURGICAL HISTORY:  Diabetes mellitus    CAD (coronary artery disease)    Chronic diastolic congestive heart failure    Severe aortic stenosis    Hypertension    No significant past surgical history      FAMILY HISTORY:  No pertinent family history in first degree relatives      Allergies    No Known Allergies    Intolerances      OBJECTIVE:  ICU Vital Signs Last 24 Hrs  T(C): 36.8 (2021 18:44), Max: 39.6 (2021 15:15)  T(F): 98.2 (2021 18:44), Max: 103.2 (2021 15:15)  HR: 132 (2021 18:44) (77 - 132)  BP: 94/62 (2021 18:44) (94/62 - 132/66)  BP(mean): --  ABP: --  ABP(mean): --  RR: 40 (2021 18:44) (18 - 40)  SpO2: 100% (2021 18:44) (94% - 100%)        06-06 @ 07:01  -  -07 @ 07:00  --------------------------------------------------------  IN: 720 mL / OUT: 400 mL / NET: 320 mL      CAPILLARY BLOOD GLUCOSE      POCT Blood Glucose.: 218 mg/dL (2021 18:56)      PHYSICAL EXAM:  GENERAL: Significcant tachypnea and repsiratory distress on BiPAP  HEAD:  Atraumatic, Normocephalic  EYES: EOMI, PERRLA, conjunctiva and sclera clear  ENT: Moist mucous membranes  CHEST/LUNG: Crackles bilaterally.   HEART: tachycardic, regular  ABDOMEN: Bowel sounds present; Soft, Nontender, Nondistended  EXTREMITIES:  Cool extremities  NERVOUS SYSTEM: AMS, indicating to family that she is thirsty and hungry.   SKIN: No rashes or lesions    HOSPITAL MEDICATIONS:  MEDICATIONS  (STANDING):  albuterol/ipratropium for Nebulization 3 milliLiter(s) Nebulizer every 6 hours  allopurinol 300 milliGRAM(s) Oral every 12 hours  artificial tears (preservative free) Ophthalmic Solution 1 Drop(s) Both EYES two times a day  atorvastatin 80 milliGRAM(s) Oral at bedtime  dextrose 40% Gel 15 Gram(s) Oral once  dextrose 50% Injectable 25 Gram(s) IV Push once  dextrose 50% Injectable 12.5 Gram(s) IV Push once  dextrose 50% Injectable 25 Gram(s) IV Push once  glucagon  Injectable 1 milliGRAM(s) IntraMuscular once  heparin   Injectable 5000 Unit(s) IV Push once  insulin glargine Injectable (LANTUS) 17 Unit(s) SubCutaneous at bedtime  insulin lispro (ADMELOG) corrective regimen sliding scale   SubCutaneous three times a day before meals  insulin lispro (ADMELOG) corrective regimen sliding scale   SubCutaneous at bedtime  insulin lispro Injectable (ADMELOG) 9 Unit(s) SubCutaneous three times a day before meals  lidocaine 2% Injectable 20 milliLiter(s) Local Injection once  melatonin 3 milliGRAM(s) Oral at bedtime  metoprolol tartrate 25 milliGRAM(s) Oral two times a day  pantoprazole  Injectable 40 milliGRAM(s) IV Push two times a day  piperacillin/tazobactam IVPB.. 3.375 Gram(s) IV Intermittent every 8 hours  polyethylene glycol 3350 17 Gram(s) Oral daily  senna 2 Tablet(s) Oral at bedtime  simethicone 80 milliGRAM(s) Chew three times a day    MEDICATIONS  (PRN):  acetaminophen   Tablet .. 650 milliGRAM(s) Oral every 6 hours PRN Mild Pain (1 - 3), Moderate Pain (4 - 6)  metoprolol tartrate Injectable 5 milliGRAM(s) IV Push every 6 hours PRN HR > 110  traMADol 25 milliGRAM(s) Oral every 6 hours PRN Moderate Pain (4 - 6)      LABS:                        8.1    53.04 )-----------( 34       ( 2021 18:25 )             27.6     Hgb Trend: 8.1<--, 8.1<--, 7.8<--, 7.8<--, 8.8<--  06-07    130<L>  |  91<L>  |  30<H>  ----------------------------<  294<H>  4.6   |  <10<LL>  |  1.43<H>    Ca    9.2      2021 18:25  Phos  6.8     0607  Mg     2.8     0607    TPro  9.1<H>  /  Alb  3.2<L>  /  TBili  1.8<H>  /  DBili  x   /  AST  58<H>  /  ALT  25  /  AlkPhos  98  07    Creatinine Trend: 1.43<--, 1.06<--, 0.64<--, 0.91<--  PT/INR - ( 2021 06:19 )   PT: 16.6 sec;   INR: 1.41 ratio         PTT - ( 2021 06:19 )  PTT:29.6 sec      Venous Blood Gas:   @ 17:32  7.06/31/53/8/68  VBG Lactate: 18.0  Venous Blood Gas:   @ 06:24  7.42/35/33/22/58  VBG Lactate: 4.6  Venous Blood Gas:   @ 00:41  7.36/43/29/24/46  VBG Lactate: 2.9      ASSESSMENT AND PLAN:    84 yo Czech speaking female with a PMHx of CAD w/ TVD (cardiac cath 2019 in [place] revealed LAD 70-80%, OM1 80%,  RCA, family declined CABG at that time), HFpEF, severe AS, HTN, recently dx T2DM (A1C >9), chronic leukocytosis w/ blasts (WBC 13-14 in [place] in ), anemia, thrombocytopenia, and GERD presenting as transfer from [facility] where she was seen for 5 days of substernal cp that radiates to left jaw, left arm and back w/ associated progressive SANON x 5 days found to be septic possibly 2/2 UTI vs aspiration PNA given tracheal secretions on CTAP w/ elevated CE c/f NSTEMI transferred to [facility] for cardiology evaluation now admitted to MICU with respiratory failure 2/2 pulmonary edema intubated, septic shock with unclear source with increasing WBC count on levophed, NSTEMI and cardiogenic shock with increasing troponin leak.       Neuro  #Sedation   - Currently on propofol gtt for sedation while intubated   - Mental status prior to intubation was good with patient communicative albeit lethargic   - Wean sedation as tolerated      Cardiovascular  #Shock   - Ddx cardiogenic vs septic shock requiring levophed for support   - Will avoid diuresis, patient does not appear systemically volume overloaded and is likely preload dependant given severe AS.     #STEMI/NSTEMI   - DAPT being held in setting of thrombocytopenia  - now with avR ST elevation on EKG performed when patient in MICU   - trop uptrending (1417 <- 649)    - cardiology following, no cath or intervention at this time   - echo performed , results pending    Pulmonary  #Hypoxic respiratory failure   - currently intubated ( - ) for increased WOB on BiPAP and worsening AMS  - 2/2 pulmonary edema in setting of severe LV dysfunction, and possible PNA    FEN/GI  # Nutrition   - trickle feeds while on vent   - pantoprazole 40 qday     Renal  #MARINA   - likely prerenal etiology (hypoperfusion 2/2 worsening HF)   - Cr uptrending (1.4 <- 1.0 <- 0.6)   - alejo in placed   - closely monitor I/O     #Metabolic acidosis   - pH 7.06 with HCO3 9 on VBG with anion gap 33  - likely 2/2 lactic acidosis @ 18   - patient currently intubated on pressors, will trend     ID  #Fevers/ Sepsis   - previous ddx included PNA (albeit CT Chest negative) and UTI ( UCx negative)   - fevers potentially 2/2 malignancy   - WBC dramatically uptrending (53 <- 24 over 12hrs)   - currently on zosyn (  - )  - vancomycin 1g given  for worsening clinical course/shock/rising WBC count   - MRSA PCR ordered   - BCx and UCx  negative   - rpt BCx sent  given worsening clinical course   - ID following     Heme  # Leukocytosis   - WBC dramatically uptrending (53 <- 24 over 12hrs)  - per heme/onc suspect underlying high grade MDS or AML  - s/p bone marrow Bx    - allopurinol for TLS prophylaxis     # DVT ppx   - SCDs  - heparin SC held /2 platelets     Endo  #T2DM   - lantus 14U qhs decreased [id number] qhs while taking PO, previously also on humalog 9U TID premeal   - ISS     GOC   - extensive GOC had with family. Aware of poor prognosis but would like patient full code. Family of 8 physician children. Dr Salas Yarbrough @ [phone] MICU Accept Note    HPI / INTERVAL HISTORY:    86 yo Greek speaking female with a PMHx of CAD w/ TVD (cardiac cath 2019 in [place] revealed LAD 70-80%, OM1 80%,  RCA, family declined CABG at that time), HFpEF, severe AS, HTN, recently dx T2DM (A1C >9), chronic leukocytosis (WBC 13-14 in [place] in ), anemia/thrombocytopenia acutely worsening recently (plt 70s-->20s) w/ blasts, and GERD presenting as transfer from [facility] where she was seen for 5 days of substernal cp that radiates to left jaw, left arm and back w/ associated progressive SANON x 5 days. Found to be septic w/ Tmax 101, WBC 18, 7% bands, 10% blasts, lactate 4.7 --> 3, sinus tach 110 w/ PVCs w/ mildly positive UA (11-25 WBC, trace LE, occasional bacteria) and w/ elevated cardiac enzymes (TropI 0.494, CKMB 4.4, BNP 1790) w/o EKG changes c/f NSTEMI vs. demand ischemia. Evaluated by cardiology. Recommendation to hold hep gtt and DAPT due to thrombocytopenia and transfer to tertiary center for higher acuity of care. Also evaluated by critical care, found to have low EF and apical hypokinesis on bedside POCUS. Per son, no hx of cancer in patient or family. Per daughter, flow cytometry checked in [facility] in  was inconclusive. Blasts noted on CBC within the last week only.     While patient was in the hospital, she was seen by cardiology, structural heart (for NSTEMI and aortic stenosis), heme/onc (for evaluation of potential hematologic malignancy), ID (for sepsis). Per cardiology, DAPT that patient was on for CAD was discontinued in light of worsening thrombocytopenia and hemoptysis patient was previously experiencing, despite knowing that pait. Furthermore, for this reason patient was not a cath candidate to to inability to restart DAPT in this setting if patient were to receive AC/DAPT. Per structural heart, it is unlikely that patient would be a candidate for TAVR based on all her active issues pending formal echo. Bone marrow biopsy performed  by heme/onc. ID following for sepsis.     MICU consulted for patient's worsening respiratory failure this afternoon. Patient was on BiPAP on arrival from [place] yesterday but was weaned to 2L NC. This afternoon patient developed increasing WOB and respiratory distress that persisted despite BiPAP. Patient was found to have a worsening WBC count to 59, worsening MARINA ( 1.4 <- 1.0 <- 0.6 ), and significant lactic acidosis with bicarb <10 and lactate of 18. CXR showed new pulmonary edema with minimal urine output despite lasix. Extensive GOC conversations had with family. Decision was made to proceed with full code status with family understanding that patient is likely to , and patient was started on levophed 2/2 hypotension, intubated for AMS and increased WOB, and brought to MICU.       PAST MEDICAL & SURGICAL HISTORY:  Diabetes mellitus    CAD (coronary artery disease)    Chronic diastolic congestive heart failure    Severe aortic stenosis    Hypertension    No significant past surgical history      FAMILY HISTORY:  No pertinent family history in first degree relatives      Allergies    No Known Allergies    Intolerances      OBJECTIVE:  ICU Vital Signs Last 24 Hrs  T(C): 36.8 (2021 18:44), Max: 39.6 (2021 15:15)  T(F): 98.2 (2021 18:44), Max: 103.2 (2021 15:15)  HR: 132 (2021 18:44) (77 - 132)  BP: 94/62 (2021 18:44) (94/62 - 132/66)  BP(mean): --  ABP: --  ABP(mean): --  RR: 40 (2021 18:44) (18 - 40)  SpO2: 100% (2021 18:44) (94% - 100%)        06-06 @ 07:01  -  -07 @ 07:00  --------------------------------------------------------  IN: 720 mL / OUT: 400 mL / NET: 320 mL      CAPILLARY BLOOD GLUCOSE      POCT Blood Glucose.: 218 mg/dL (2021 18:56)      PHYSICAL EXAM:  GENERAL: Significcant tachypnea and repsiratory distress on BiPAP  HEAD:  Atraumatic, Normocephalic  EYES: EOMI, PERRLA, conjunctiva and sclera clear  ENT: Moist mucous membranes  CHEST/LUNG: Crackles bilaterally.   HEART: tachycardic, regular  ABDOMEN: Bowel sounds present; Soft, Nontender, Nondistended  EXTREMITIES:  Cool extremities  NERVOUS SYSTEM: AMS, indicating to family that she is thirsty and hungry.   SKIN: No rashes or lesions    HOSPITAL MEDICATIONS:  MEDICATIONS  (STANDING):  albuterol/ipratropium for Nebulization 3 milliLiter(s) Nebulizer every 6 hours  allopurinol 300 milliGRAM(s) Oral every 12 hours  artificial tears (preservative free) Ophthalmic Solution 1 Drop(s) Both EYES two times a day  atorvastatin 80 milliGRAM(s) Oral at bedtime  dextrose 40% Gel 15 Gram(s) Oral once  dextrose 50% Injectable 25 Gram(s) IV Push once  dextrose 50% Injectable 12.5 Gram(s) IV Push once  dextrose 50% Injectable 25 Gram(s) IV Push once  glucagon  Injectable 1 milliGRAM(s) IntraMuscular once  heparin   Injectable 5000 Unit(s) IV Push once  insulin glargine Injectable (LANTUS) 17 Unit(s) SubCutaneous at bedtime  insulin lispro (ADMELOG) corrective regimen sliding scale   SubCutaneous three times a day before meals  insulin lispro (ADMELOG) corrective regimen sliding scale   SubCutaneous at bedtime  insulin lispro Injectable (ADMELOG) 9 Unit(s) SubCutaneous three times a day before meals  lidocaine 2% Injectable 20 milliLiter(s) Local Injection once  melatonin 3 milliGRAM(s) Oral at bedtime  metoprolol tartrate 25 milliGRAM(s) Oral two times a day  pantoprazole  Injectable 40 milliGRAM(s) IV Push two times a day  piperacillin/tazobactam IVPB.. 3.375 Gram(s) IV Intermittent every 8 hours  polyethylene glycol 3350 17 Gram(s) Oral daily  senna 2 Tablet(s) Oral at bedtime  simethicone 80 milliGRAM(s) Chew three times a day    MEDICATIONS  (PRN):  acetaminophen   Tablet .. 650 milliGRAM(s) Oral every 6 hours PRN Mild Pain (1 - 3), Moderate Pain (4 - 6)  metoprolol tartrate Injectable 5 milliGRAM(s) IV Push every 6 hours PRN HR > 110  traMADol 25 milliGRAM(s) Oral every 6 hours PRN Moderate Pain (4 - 6)      LABS:                        8.1    53.04 )-----------( 34       ( 2021 18:25 )             27.6     Hgb Trend: 8.1<--, 8.1<--, 7.8<--, 7.8<--, 8.8<--  06-07    130<L>  |  91<L>  |  30<H>  ----------------------------<  294<H>  4.6   |  <10<LL>  |  1.43<H>    Ca    9.2      2021 18:25  Phos  6.8     07  Mg     2.8     0607    TPro  9.1<H>  /  Alb  3.2<L>  /  TBili  1.8<H>  /  DBili  x   /  AST  58<H>  /  ALT  25  /  AlkPhos  98      Creatinine Trend: 1.43<--, 1.06<--, 0.64<--, 0.91<--  PT/INR - ( 2021 06:19 )   PT: 16.6 sec;   INR: 1.41 ratio         PTT - ( 2021 06:19 )  PTT:29.6 sec      Venous Blood Gas:   @ 17:32  7.06/31/53/8/68  VBG Lactate: 18.0  Venous Blood Gas:   @ 06:24  7.42/35/33/22/58  VBG Lactate: 4.6  Venous Blood Gas:   @ 00:41  7.36/43/29/24/46  VBG Lactate: 2.9    Radiology:  Personally reviewed    CXR- new pulmonary edema      ASSESSMENT AND PLAN:    86 yo Greek speaking female with a PMHx of CAD w/ TVD (cardiac cath 2019 in [place] revealed LAD 70-80%, OM1 80%,  RCA, family declined CABG at that time), HFpEF, severe AS, HTN, recently dx T2DM (A1C >9), chronic leukocytosis w/ blasts (WBC 13-14 in [place] in ), anemia, thrombocytopenia, and GERD presenting as transfer from [facility] where she was seen for 5 days of substernal cp that radiates to left jaw, left arm and back w/ associated progressive SANON x 5 days found to be septic possibly 2/2 UTI vs aspiration PNA given tracheal secretions on CTAP w/ elevated CE c/f NSTEMI transferred to [facility] for cardiology evaluation now admitted to MICU with respiratory failure 2/2 pulmonary edema intubated, septic shock with unclear source with increasing WBC count on levophed, NSTEMI and cardiogenic shock with increasing troponin leak.       Neuro  #Sedation   - Currently on propofol gtt for sedation while intubated   - Mental status prior to intubation was good with patient communicative albeit lethargic   - Wean sedation as tolerated      Cardiovascular  #Shock   - Ddx cardiogenic vs (less likely) septic shock requiring levophed for support   - echo today reportedly with EF=10%  - Will avoid diuresis, patient does not appear systemically volume overloaded and is likely preload dependant given severe AS.     #STEMI/NSTEMI   - DAPT being held in setting of thrombocytopenia  - now with avR ST elevation on EKG performed when patient in MICU   - trop uptrending (1417 <- 649)    - cardiology following, no cath or intervention at this time   - echo performed , results pending    Pulmonary  #Hypoxic respiratory failure   - currently intubated ( - ) for increased WOB on BiPAP and worsening AMS  - 2/2 pulmonary edema in setting of severe LV dysfunction, and less likely PNA    FEN/GI  # Nutrition   - trickle feeds while in severe shock  - pantoprazole 40 qday     Renal  #MARINA   - likely prerenal etiology (hypoperfusion 2/2 worsening HF)   - Cr uptrending (1.4 <- 1.0 <- 0.6)   - alejo in placed   - closely monitor I/O     #Metabolic acidosis   - pH 7.06 with HCO3 9 on VBG with anion gap 33  - likely 2/2 lactic acidosis @ 18   - patient currently intubated on pressors, will trend     ID  #Fevers/ Sepsis   - previous ddx included PNA (albeit CT Chest negative) and UTI ( UCx negative)   - fevers potentially 2/2 malignancy   - WBC dramatically uptrending (53 <- 24 over 12hrs)   - currently on zosyn (  - )  - vancomycin 1g given  for worsening clinical course/shock/rising WBC count   - MRSA PCR ordered   - BCx and UCx  negative   - rpt BCx sent  given worsening clinical course   - ID following     Heme  # Leukocytosis   - WBC dramatically uptrending (53 <- 24 over 12hrs)  - per heme/onc suspect underlying high grade MDS or AML  - s/p bone marrow Bx    - allopurinol for TLS prophylaxis     # DVT ppx   - SCDs  - heparin SC held 2/2 platelets     Endo  #T2DM   - lantus 14U qhs decreased [id number] qhs while taking PO, previously also on humalog 9U TID premeal   - ISS     GOC   - extensive GOC had with family. Aware of poor prognosis but would like patient full code. Family of 8 physician children. Dr Salas Yarbrough @ [phone]

## 2021-06-07 NOTE — CHART NOTE - NSCHARTNOTEFT_GEN_A_CORE
: Pascual Carey    INDICATION: Hypoxemic Respiratory Failure    PROCEDURE:  [x] LIMITED ECHO  [x] LIMITED CHEST    FINDINGS:  Lungs: Predominantly anterior A-lines with Bilateral lateral and posterior confluent B-lines. Sliding non-thickened pleura. No pleura effusions.  Heart: Severe LV dysfunction. RV = LV with preserved RV systolic function. MR VCW: 0.5cm. Tethered posterior mitral leaflet. Indeterminant IVC. No pericardial effusion      INTERPRETATION:  Pulmonary edema  Severe LV dysfunction with moderate MR. : Pascual Carey    INDICATION: Hypoxemic Respiratory Failure    PROCEDURE:  [x] LIMITED ECHO  [x] LIMITED CHEST    FINDINGS:  Lungs: Predominantly anterior A-lines with Bilateral lateral and posterior confluent B-lines. Sliding non-thickened pleura. No pleura effusions.  Heart: Severe LV dysfunction. RV = LV with preserved RV systolic function. MR VCW: 0.5cm. Tethered posterior mitral leaflet. Indeterminant IVC. No pericardial effusion      INTERPRETATION:  Pulmonary edema  Severe LV dysfunction with moderate MR.    Pulmonary ATtending  I was present throughout the procedure and agree with findings and interpretation as outlined above.

## 2021-06-07 NOTE — CONSULT NOTE ADULT - ASSESSMENT
86 yo Nepali speaking female with a PMHx of CAD w/ TVD (cardiac cath 2019 in [place] revealed LAD 70-80%, OM1 80%,  RCA, family declined CABG at that time), HFpEF, severe AS, HTN, recently dx T2DM (A1C >9), chronic leukocytosis (WBC 13-14 in [place] in 2020), anemia/thrombocytopenia acutely worsening recently (plt 70s-->20s) w/ blasts presents for oncology work up however course c/b chest pain and respiratory distress. ECG with anterior q waves and movement artifact, no ST elevations seen.     Chest pain: patient with severe un revascularized CAD  -TTE: anterior wall is hypokinetic, severe AS, mod to severe MR. Low EF  -patient is not a candidate for DAPT or AC given her hematologic disease (thrombocytopenia)  -seen by structural for AS: she is not a candidate for intervention at this time  -can trial nitro for symptom relief, however no long term options available for this patient.   -spoke to CCU attending Kiko, structural attending Chad and cath attending Kevin. We are unable to offer the patient my intervention at this time. please consider palliative care discussion as the family may not be on the same page about her grim prognosis.   -She is not a CICU candidate at this time given lack of available interventions for her.     Cassandra Garland MD  Cardiology Fellow

## 2021-06-07 NOTE — CONSULT NOTE ADULT - SUBJECTIVE AND OBJECTIVE BOX
Cassandra Garland MD  Cardiology Fellow  [phone]  All Cardiology service information can be found  on [email or web address], password: [email or web address]    Patient seen and evaluated at bedside    Chief Complaint: chest pain    HPI:  Pt was evaluated prior to midnight on 21.  Hx obtained form patient, chart review, son [place] and daughter Dr. Felipe at bedside. Patient Czech speaking. Translation provided by Czech speaking MD.     84 yo Macedonian speaking female with a PMHx of CAD w/ TVD (cardiac cath 2019 in [place] revealed LAD 70-80%, OM1 80%,  RCA, family declined CABG at that time), HFpEF, severe AS, HTN, recently dx T2DM (A1C >9), chronic leukocytosis (WBC 13-14 in [place] in ), anemia/thrombocytopenia acutely worsening recently (plt 70s-->20s) w/ blasts, and GERD presenting as transfer from [facility] where she was seen for 5 days of substernal cp that radiates to left jaw, left arm and back w/ associated progressive SANON x 5 days. Found to be septic w/ Tmax 101, WBC 18, 7% bands, 10% blasts, lactate 4.7 --> 3, sinus tach 110 w/ PVCs w/ mildly positive UA (11-25 WBC, trace LE, occasional bacteria) and w/ elevated cardiac enzymes (TropI 0.494, CKMB 4.4, BNP 1790) w/o EKG changes c/f NSTEMI vs. demand ischemia. Evaluated by cardiology. Recommendation to hold hep gtt and DAPT due to thrombocytopenia and transfer to tertiary center for higher acuity of care. Also evaluated by critical care, found to have low EF and apical hypokinesis on bedside POCUS. Per son, no hx of cancer in patient or family. Per daughter, flow cytometry checked in [facility] in  was inconclusive. Blasts noted on CBC within the last week only.     On presentation to [facility], tachypenic to 30. Noted to have wheezing. Temp 98.5,  (sinus tach on tele), 132/73, RR 30, 100% 2L NC    Patient seen and examined at bedside, noted to be wheezing and coughing with blood tinged white phlegm. SOB is improving with 2L NC. Endorses abd pain at sides of abd wall and mild back pain. No current cp. No fevers at home (low grade temp 99 day of presentation). No coughing prior to NS admission. +dysuria x 1 day, +nausea and vomiting x 1 this AM. Of note, patient on ASA and plavix at home, ASA only added within last week.     PV vitals: Tmax 101, , -130, 100% 2L NC   PV workup: CTA chest, abd pelvis neg for dissection, secretions in extrathoracic trachea, revealing eccentric wall thickening of the eso, further w/u to r/o malignancy recommended, moderate SMA stenosis   PV course: s/p vanc, zosyn x 1, 1L NS x 1 (2021 00:03)    CICU called due to patients chest pain and SOB. Patient has Bipap in place.       PMHx:   Diabetes mellitus    CAD (coronary artery disease)    Chronic diastolic congestive heart failure    Severe aortic stenosis    Hypertension        PSHx:   No significant past surgical history        Allergies:  No Known Allergies      Home Meds:    Current Medications:   acetaminophen   Tablet .. 650 milliGRAM(s) Oral every 6 hours PRN  albuterol/ipratropium for Nebulization 3 milliLiter(s) Nebulizer every 6 hours  allopurinol 300 milliGRAM(s) Oral every 12 hours  artificial tears (preservative free) Ophthalmic Solution 1 Drop(s) Both EYES two times a day  atorvastatin 80 milliGRAM(s) Oral at bedtime  dextrose 40% Gel 15 Gram(s) Oral once  dextrose 50% Injectable 25 Gram(s) IV Push once  dextrose 50% Injectable 12.5 Gram(s) IV Push once  dextrose 50% Injectable 25 Gram(s) IV Push once  glucagon  Injectable 1 milliGRAM(s) IntraMuscular once  heparin   Injectable 5000 Unit(s) IV Push once  insulin glargine Injectable (LANTUS) 17 Unit(s) SubCutaneous at bedtime  insulin lispro (ADMELOG) corrective regimen sliding scale   SubCutaneous three times a day before meals  insulin lispro (ADMELOG) corrective regimen sliding scale   SubCutaneous at bedtime  insulin lispro Injectable (ADMELOG) 9 Unit(s) SubCutaneous three times a day before meals  lidocaine 2% Injectable 20 milliLiter(s) Local Injection once  melatonin 3 milliGRAM(s) Oral at bedtime  metoprolol tartrate 25 milliGRAM(s) Oral two times a day  metoprolol tartrate Injectable 5 milliGRAM(s) IV Push every 6 hours PRN  nitroglycerin    2% Ointment 0.5 Inch(s) Transdermal once  pantoprazole  Injectable 40 milliGRAM(s) IV Push two times a day  piperacillin/tazobactam IVPB.. 3.375 Gram(s) IV Intermittent every 8 hours  polyethylene glycol 3350 17 Gram(s) Oral daily  senna 2 Tablet(s) Oral at bedtime  simethicone 80 milliGRAM(s) Chew three times a day  sodium bicarbonate  Injectable 50 milliEquivalent(s) IV Push once  sodium bicarbonate  Injectable 50 milliEquivalent(s) IV Push once  traMADol 25 milliGRAM(s) Oral every 6 hours PRN      FAMILY HISTORY:  No pertinent family history in first degree relatives      REVIEW OF SYSTEMS:  CONSTITUTIONAL: No weakness, fevers or chills  EYES/ENT: No visual changes;  No dysphagia  NECK: No pain or stiffness  RESPIRATORY: No cough, wheezing, hemoptysis; No shortness of breath  CARDIOVASCULAR: No chest pain or palpitations; No lower extremity edema  GASTROINTESTINAL: No abdominal or epigastric pain. No nausea, vomiting, or hematemesis; No diarrhea or constipation. No melena or hematochezia.  BACK: No back pain  GENITOURINARY: No dysuria, frequency or hematuria  NEUROLOGICAL: No numbness or weakness  SKIN: No itching, burning, rashes, or lesions   All other review of systems is negative unless indicated above.    Physical Exam:  T(F): 100.3 (-), Max: 103.2 ()  HR: 77 () (77 - 130)  BP: 132/66 () (113/62 - 132/66)  RR: 18 ()  SpO2: 100% ()  GENERAL: in distress, bipap in place  Difficult to hear breath sounds given bipap  Cardiac: S1 S2 systolic murmur at the RUSB  Abd: soft nontender to palpation  Extremities: no edema noted    CXR: Personally reviewed    Labs: Personally reviewed                        8.1    24.48 )-----------( 23       ( 2021 06:19 )             25.0     06-07    129<L>  |  96  |  22  ----------------------------<  234<H>  4.3   |  18<L>  |  1.06    Ca    8.7      2021 06:20  Phos  3.5     06-07  Mg     2.4     06-07    TPro  8.1  /  Alb  3.0<L>  /  TBili  1.8<H>  /  DBili  x   /  AST  39  /  ALT  24  /  AlkPhos  87  06-07    PT/INR - ( 2021 06:19 )   PT: 16.6 sec;   INR: 1.41 ratio         PTT - ( 2021 06:19 )  PTT:29.6 sec    CARDIAC MARKERS ( 2021 06:20 )  649 ng/L / x     / x     / 132 U/L / x     / 5.7 ng/mL  CARDIAC MARKERS ( 2021 22:48 )  576 ng/L / x     / x     / 218 U/L / x     / 10.3 ng/mL  CARDIAC MARKERS ( 2021 14:00 )  739 ng/L / x     / x     / 222 U/L / x     / 17.9 ng/mL  CARDIAC MARKERS ( 2021 07:23 )  463 ng/L / x     / x     / 212 U/L / x     / 18.2 ng/mL  CARDIAC MARKERS ( 2021 00:38 )  422 ng/L / x     / x     / 261 U/L / x     / 30.9 ng/mL        Serum Pro-Brain Natriuretic Peptide: 1790 pg/mL ( @ 16:47)    Total Cholesterol: 70  LDL: --  HDL: 27  T      Thyroid Stimulating Hormone, Serum: 1.97 uIU/mL ( @ 02:49)

## 2021-06-07 NOTE — PROGRESS NOTE ADULT - PROBLEM SELECTOR PLAN 6
Normocytic anemia (bl 11-12), thrombocytopenia (bl plt 75, recent downtrend to 35 as outpatient)  -keep active T&S  -heme onc consult in AM  -transfuse Hb <7, transfuse plt <15 if febrile Patient noted w/ wheezing on exam  -increased work of breathing noted today, with crackles on lung exam; s/p Lasix x 1 (6/6)  -official TTE Patient noted w/ wheezing on exam  -increased work of breathing noted today, with crackles on lung exam; s/p trial Lasix x 1 today (6/7)  -official TTE

## 2021-06-07 NOTE — PROGRESS NOTE ADULT - ASSESSMENT
Patient is an 84 y/o (Maltese-speaking) F w/ a PMHx of CAD w/ TVD (cardiac cath 2019 in [place] revealed LAD 70-80%, OM1 80%,  RCA, family declined CABG at that time), HFpEF, severe AS, HTN, recently dx T2DM (A1C >9), chronic leukocytosis (WBC 13-14 in [place] in 2020), anemia/thrombocytopenia, and GERD who presented as transfer from [facility] where she was seen for 5 days for substernal chest pain that radiated to left jaw, left arm, and back w/ associated progressive SANON x 5 days, found to have NSTEMI vs demand ischemia, met sepsis criteria, and also found to have a leukocytosis w/ 10% blasts, and was admitted to telemetry for further management.    She has a leukocytosis/peripheral blasts.  Now with a progressive thrombocytopenia as well. Suspect underlying high grade MDS or AML.   Bone marrow biopsy today.  BMBX 3 yrs ago was reportedly normal, completed at that time due to concern for ?metastatic disease- but work up was negative.  Peripheral blood flow cytometry pending.    Awaiting GI evaluation for esophageal findings.   Continue treatment for sepsis/angina/?heart failure.    Transfusional support as needed.  Hold on anticoagulation with thrombocytopenia.

## 2021-06-07 NOTE — PROGRESS NOTE ADULT - PROBLEM SELECTOR PLAN 3
UA mildly positive w/ 11-25 WBC, trace LE, occasional bacteria. Dysuria ongoing x 1 year per patient   -c/w abx as above  -f/u UCx Patient tachypneic on presentation to [facility], requiring 2L NC. No hypercapnia on VBG   -concern for aspiration given tracheal secretions on CTA w/o focal consolidation, less likely PE as CTA chest neg. DDx also includes fluid overload i/s/o AS and severe LV dysfunction on bedside POCUS s/p IVF  -BIPAP for inc wob  -pulmonology consulted (6/7), recs appreciated Patient tachypneic on presentation to [facility], requiring 2L NC. No hypercapnia on VBG   -concern for aspiration given tracheal secretions on CTA w/o focal consolidation, less likely PE as CTA chest neg. DDx also includes fluid overload i/s/o AS and severe LV dysfunction on bedside POCUS s/p IVF at OSH  -BIPAP for inc wob  -pulmonology consulted (6/7), recs appreciated

## 2021-06-07 NOTE — PROGRESS NOTE ADULT - ASSESSMENT
85f with a limited hx until recently.  She has htn, oa, more recent dm.  She has had leukocytosis over the past year, which has been unexplained.  She had a compression fx in ~2019 in [place], and there was suspicion for mets at that time and an extensive eval was done then, apparently inconclusive. She had cardiac cath in [facility] for unclear reasons in 2019, and apparently was discovered to have an occluded and collateralized rca, with severe disease of the lad and om.  She was medically managed at that time, for unclear reasons-the pt is not aware of having had the test, though the daughter states she is not demented.    More recently she has had more medical care because of htn and slurred speech.  A series of evaluations was done, including a tte which revealed severe low gradient AS (gradients not available).  She was being considered for cabg/avr and pci/tavr, and more recently the latter was being pursued.  Over the past 2 months she has had progressive mcdermott and over the past 2 weeks she has had diffuse body aches as well as some more localized chest discomfort, radiating to the back at times.  She was noted to have progressive thrombocytopenia, with her platelet count dropping from ~100 to ~25, and with progressive anemia as well.  ntg sl was prescribed 2d ago, taken once.  She has been taking asa and plavix.      She was brought to [place] by family for the platelet count, but were sent home given that she was not bleeding. Brought to er by her daughter.    In the er, she was febrile to 101, tachycardic. Given her complex cardiac and heme situation she was transferred overnight and is now on floor    -known severe multivessel cad  -had been on dapt despite low platelets, and given the platelet count, this was discontinued   -noting a degree of reported hemoptysis, and the throbocytopenia, we cannot resume asa/plavix, despite the rising trops  -seems somewhat unlikely that she is having an unstable/acs type event, and is much more likely to be having a demand event given anemia, tachycardia, AS and cad   - her HS troponins appear to be dynamic. please trend full set of Radha  -no heparin   -given her apparent bone marrow issue, she cannot be considered a viable candidate for intervention even in the acute setting  - ctsx eval noted. she has previously refused CABG/SAVR. At this time she is not a candidate. I dont believe structural heart will be able to provide any additional options given her hematological issues.   - she is tachy and febrile. cont abx  -fever may be infectious or related to malignancy  - heme fu    - cont bb as tolerated.   -statin    -not clearly in heart failure based on exam or ct chest  -bnp level unreliable for heart failure in this setting  -was sob and was given lasix and bipap,    - she has not gotten any diuretics in last 48 hours.   - now with elevated lactate, hyponatremia  - ?IVF    -monitor for arrhythmia  -echo    - Further cardiac workup will depend on clinical course.     Upon my evaluation, this patient is at high risk for imminent or life threatening deterioration due to resp failure, hypotension, lactic acidosis  and other active medical issues which require my direct attention, intervention, and personal management.  I have personally spent >35 minutes  of critical care time exclusive of time spent on separate billing procedures. This includes review of laboratory data, radiology results, discussion with primary team, and monitoring for potential decompensation Interventions were performed as documented above.

## 2021-06-07 NOTE — PROVIDER CONTACT NOTE (CRITICAL VALUE NOTIFICATION) - SITUATION
Patient has CO2 less than 10.
Patient has lactate of 18, pH of 7.06
Lactate 18.4
Troponin 422
Lactate 4.9
Lactate 2.9 from VBG
lactate 4.6 from VBG

## 2021-06-07 NOTE — PROGRESS NOTE ADULT - PROBLEM SELECTOR PLAN 7
Patient noted to be wheezing on exam.  -hold lasix for now  - clinically euvolemic  - TTE Was pending TAVR as outpatient  -avoid fluids i/s/o known severe AS and bedside POCUS with severe LV dysfunction

## 2021-06-07 NOTE — CONSULT NOTE ADULT - PROBLEM SELECTOR RECOMMENDATION 9
Pt w/ TVD on cardiac cath in [place] in 2019 - family refused surgical intervention at that time   Per pt's daughter, family is still not amenable to surgical intervention at this time   No CT surgery intervention at this time given family refusal and pt's current condition/concern for malignancy   Recommend continuation of medical management as per primary team and cardiology
- awaiting echocardiogram to assess aortic stenosis  - will review echo when available, however it is unlikely that she would be a candidate for TAVR based on all her active issues  -- being evaluated for leukemia vs MDS (bone marrow biopsy)  -- anemia  -- thrombocytopenia  -- RUQ pain  -- extensive triple vessel CAD which may or may not be amenable to PCI
-likely from noninfectious cause like leukemia/MDS or NSTEMI  -blasts on cbc noted  -Cx and CT negative  -BM biopsy being done by Heme

## 2021-06-07 NOTE — PROGRESS NOTE ADULT - SUBJECTIVE AND OBJECTIVE BOX
[facility] -- Som Motta, Deepti, Trinidad, Zaid Salguero Savella  Office # [phone]      Follow Up:  CAD, severe AS, dyspnea    Subjective/Observations: Patient seen and examined. Events noted.  Appears tachypneic. daughter states taht pt is still unconfortable. pt c/o diffuse body pains. dyspnea unchanged    REVIEW OF SYSTEMS: Limited 2/2 comorbidities     PAST MEDICAL & SURGICAL HISTORY:  Diabetes mellitus    CAD (coronary artery disease)    Chronic diastolic congestive heart failure    Severe aortic stenosis    Hypertension    No significant past surgical history        MEDICATIONS  (STANDING):  allopurinol 300 milliGRAM(s) Oral every 12 hours  artificial tears (preservative free) Ophthalmic Solution 1 Drop(s) Both EYES two times a day  atorvastatin 80 milliGRAM(s) Oral at bedtime  dextrose 40% Gel 15 Gram(s) Oral once  dextrose 50% Injectable 25 Gram(s) IV Push once  dextrose 50% Injectable 12.5 Gram(s) IV Push once  dextrose 50% Injectable 25 Gram(s) IV Push once  glucagon  Injectable 1 milliGRAM(s) IntraMuscular once  insulin glargine Injectable (LANTUS) 14 Unit(s) SubCutaneous at bedtime  insulin lispro (ADMELOG) corrective regimen sliding scale   SubCutaneous three times a day before meals  insulin lispro (ADMELOG) corrective regimen sliding scale   SubCutaneous at bedtime  insulin lispro Injectable (ADMELOG) 4 Unit(s) SubCutaneous three times a day before meals  melatonin 3 milliGRAM(s) Oral at bedtime  metoprolol tartrate 25 milliGRAM(s) Oral two times a day  pantoprazole  Injectable 40 milliGRAM(s) IV Push two times a day  piperacillin/tazobactam IVPB.. 3.375 Gram(s) IV Intermittent every 8 hours  polyethylene glycol 3350 17 Gram(s) Oral daily  senna 2 Tablet(s) Oral at bedtime    MEDICATIONS  (PRN):  acetaminophen   Tablet .. 650 milliGRAM(s) Oral every 6 hours PRN Mild Pain (1 - 3), Moderate Pain (4 - 6)  albuterol/ipratropium for Nebulization 3 milliLiter(s) Nebulizer every 6 hours PRN Shortness of Breath and/or Wheezing  metoprolol tartrate Injectable 5 milliGRAM(s) IV Push every 6 hours PRN HR > 110      Allergies    No Known Allergies    Intolerances            Vital Signs Last 24 Hrs  T(C): 36.8 (07 Jun 2021 04:44), Max: 39.3 (07 Jun 2021 03:29)  T(F): 98.2 (07 Jun 2021 04:44), Max: 102.8 (07 Jun 2021 03:29)  HR: 122 (07 Jun 2021 04:44) (97 - 122)  BP: 115/57 (07 Jun 2021 04:44) (103/62 - 121/75)  BP(mean): --  RR: 20 (07 Jun 2021 04:44) (18 - 24)  SpO2: 100% (07 Jun 2021 04:44) (94% - 100%)    I&O's Summary    06 Jun 2021 07:01  -  07 Jun 2021 07:00  --------------------------------------------------------  IN: 720 mL / OUT: 400 mL / NET: 320 mL          PHYSICAL EXAM:  TELE: ST   Constitutional: tachpneic, unconfortable  HEENT: Moist Mucous Membranes, Anicteric  Pulmonary: Decreased breath sounds b/l. crackles right>left   Cardiovascular: Regular, S1 and soft s2 3/6SM  Gastrointestinal: Bowel Sounds present, soft, nontender.   Lymph: No peripheral edema. No lymphadenopathy.  Skin: No visible rashes or ulcers.  Psych:  Mood & affect appropriate for situation    LABS: All Labs Reviewed:                        8.1    24.48 )-----------( 23       ( 07 Jun 2021 06:19 )             25.0                         7.8    23.14 )-----------( 27       ( 06 Jun 2021 15:05 )             23.8                         7.8    17.68 )-----------( 23       ( 06 Jun 2021 07:26 )             23.5     07 Jun 2021 06:20    129    |  96     |  22     ----------------------------<  234    4.3     |  18     |  1.06   06 Jun 2021 00:38    131    |  99     |  14     ----------------------------<  225    4.0     |  19     |  0.64   05 Jun 2021 16:47    133    |  100    |  16     ----------------------------<  259    4.1     |  21     |  0.91     Ca    8.7        07 Jun 2021 06:20  Ca    8.5        06 Jun 2021 00:38  Ca    8.5        05 Jun 2021 16:47  Phos  3.5       07 Jun 2021 06:20  Phos  3.2       06 Jun 2021 00:38  Phos  2.9       05 Jun 2021 16:47  Mg     2.4       07 Jun 2021 06:20  Mg     2.3       06 Jun 2021 00:38  Mg     1.8       05 Jun 2021 16:47    TPro  8.1    /  Alb  3.0    /  TBili  1.8    /  DBili  x      /  AST  39     /  ALT  24     /  AlkPhos  87     07 Jun 2021 06:20  TPro  8.0    /  Alb  3.2    /  TBili  1.1    /  DBili  x      /  AST  45     /  ALT  22     /  AlkPhos  108    06 Jun 2021 00:38  TPro  9.3    /  Alb  3.1    /  TBili  0.9    /  DBili  x      /  AST  42     /  ALT  27     /  AlkPhos  127    05 Jun 2021 16:47    PT/INR - ( 07 Jun 2021 06:19 )   PT: 16.6 sec;   INR: 1.41 ratio         PTT - ( 07 Jun 2021 06:19 )  PTT:29.6 sec  CARDIAC MARKERS ( 07 Jun 2021 06:20 )  x     / x     / 132 U/L / x     / 5.7 ng/mL  CARDIAC MARKERS ( 06 Jun 2021 22:48 )  x     / x     / 218 U/L / x     / 10.3 ng/mL  CARDIAC MARKERS ( 06 Jun 2021 14:00 )  x     / x     / 222 U/L / x     / 17.9 ng/mL  CARDIAC MARKERS ( 06 Jun 2021 07:23 )  x     / x     / 212 U/L / x     / 18.2 ng/mL  CARDIAC MARKERS ( 06 Jun 2021 00:38 )  x     / x     / 261 U/L / x     / 30.9 ng/mL  CARDIAC MARKERS ( 05 Jun 2021 16:47 )  .494 ng/mL / x     / x     / x     / 4.4 ng/mL    < from: CT Angio Chest PE Protocol w/ IV Cont (06.05.21 @ 19:07) >    EXAM:  CT ANGIO ABD PELV (W)AW IC                          EXAM:  CT ANGIO CHEST PULM ART WAWIC                            PROCEDURE DATE:  06/05/2021          INTERPRETATION:  CLINICAL INFORMATION: Shortness of breath and chest pain.    COMPARISON: None.    CONTRAST/COMPLICATIONS:  IV Contrast: Omnipaque 350 (accession [id number]), IV contrast documented in associated exam (accession [id number])  95 cc administered   5 cc discarded  Oral Contrast: NONE  Complications: None reported at time of study completion    PROCEDURE:  CT Angiography of the Chest, Abdomen and Pelvis.  Precontrast imaging was performed through the chest followed by arterial phase imaging of the chest, abdomen and pelvis.  Sagittal and coronal reformats were performed as well as 3D (MIP) reconstructions.    FINDINGS:  CHEST:  LUNGS AND LARGE AIRWAYS: Secretions within the extrathoracic trachea. Scattered linear subsegmental atelectasis. Evaluation of the lung parenchyma is limited by respiratory motion.  PLEURA: No pleural effusion.  VESSELS: No aortic intramural hematoma, aneurysm, or evidence of acute dissection. Atherosclerotic calcifications of the thoracic aorta.  HEART: Cardiomegaly. No pericardial effusion. Aortic valvular and coronary artery calcification.  MEDIASTINUM AND RAMANA: Suspicion for eccentric wall thickening involving the distal esophagus.  CHEST WALL AND LOWER NECK: Within normal limits.    ABDOMEN AND PELVIS:  LIVER: Steatosis.  BILE DUCTS: Normal caliber.  GALLBLADDER: Cholelithiasis.  SPLEEN: Within normal limits.  PANCREAS: Within normal limits.  ADRENALS: Within normal limits.  KIDNEYS/URETERS: Right renal cyst. No hydronephrosis.    BLADDER: Within normal limits.  REPRODUCTIVE ORGANS: Uterus is unremarkable. Coarse calcifications within the bilateral adnexa, nonspecific..    BOWEL: No bowel obstruction. Colonic diverticulosis.  PERITONEUM: No ascites.  VESSELS: Atherosclerotic changes. Patent celiac, superior mesenteric, inferior mesenteric, and bilateral renal arteries. Superiormesenteric artery demonstrates at least moderate stenosis of its proximal segment.  RETROPERITONEUM/LYMPH NODES: No lymphadenopathy.  ABDOMINAL WALL: Within normal limits.  BONES: Degenerative changes. Age indeterminant moderate compression deformityof T5 and anterior wedging of the L1 vertebral body.    IMPRESSION:  No aortic dissection.    Eccentric wall thickening involving the distal esophagus. Further evaluation with upper endoscopy is recommended to exclude underlying malignancy/lesion.            NOLAN PINO MD; Attending Radiologist  This document has been electronically signed. Jun 5 2021  7:40PM    < end of copied text >

## 2021-06-07 NOTE — CONSULT NOTE ADULT - CONSULT REASON
Concern for MDS vs leukemia
3VD, severe AS
Fever
hypoxemia; increased WOB
esophageal thickening on CT
Chest pain
aortic stenosis

## 2021-06-07 NOTE — CONSULT NOTE ADULT - SUBJECTIVE AND OBJECTIVE BOX
SULTANA EVERETT 85y Female  MRN-[id number]    Patient is a 85y old  Female who presents with a chief complaint of chest pain (2021 13:12)      HPI:  Pt was evaluated prior to midnight on 21.  Hx obtained form patient, chart review, son [place] and daughter Rc Matteo at bedside. Patient Tamazight speaking. Translation provided by Tamazight speaking MD.     86 yo Albanian speaking female with a PMHx of CAD w/ TVD (cardiac cath 2019 in [place] revealed LAD 70-80%, OM1 80%,  RCA, family declined CABG at that time), HFpEF, severe AS, HTN, recently dx T2DM (A1C >9), chronic leukocytosis (WBC 13-14 in [place] in ), anemia/thrombocytopenia acutely worsening recently (plt 70s-->20s) w/ blasts, and GERD presenting as transfer from [facility] where she was seen for 5 days of substernal cp that radiates to left jaw, left arm and back w/ associated progressive SANON x 5 days. Found to be septic w/ Tmax 101, WBC 18, 7% bands, 10% blasts, lactate 4.7 --> 3, sinus tach 110 w/ PVCs w/ mildly positive UA (11-25 WBC, trace LE, occasional bacteria) and w/ elevated cardiac enzymes (TropI 0.494, CKMB 4.4, BNP 1790) w/o EKG changes c/f NSTEMI vs. demand ischemia. Evaluated by cardiology. Recommendation to hold hep gtt and DAPT due to thrombocytopenia and transfer to tertiary center for higher acuity of care. Also evaluated by critical care, found to have low EF and apical hypokinesis on bedside POCUS. Per son, no hx of cancer in patient or family. Per daughter, flow cytometry checked in [facility] in  was inconclusive. Blasts noted on CBC within the last week only.     On presentation to [facility], tachypenic to 30. Noted to have wheezing. Temp 98.5,  (sinus tach on tele), 132/73, RR 30, 100% 2L NC    Patient seen and examined at bedside, noted to be wheezing and coughing with blood tinged white phlegm. SOB is improving with 2L NC. Endorses abd pain at sides of abd wall and mild back pain. No current cp. No fevers at home (low grade temp 99 day of presentation). No coughing prior to NS admission. +dysuria x 1 day, +nausea and vomiting x 1 this AM. Of note, patient on ASA and plavix at home, ASA only added within last week.     PV vitals: Tmax 101, , -130, 100% 2L NC   PV workup: CTA chest, abd pelvis neg for dissection, secretions in extrathoracic trachea, revealing eccentric wall thickening of the eso, further w/u to r/o malignancy recommended, moderate SMA stenosis   PV course: s/p vanc, zosyn x 1, 1L NS x 1 (2021 00:03)      PAST MEDICAL & SURGICAL HISTORY:  Diabetes mellitus    CAD (coronary artery disease)    Chronic diastolic congestive heart failure    Severe aortic stenosis    Hypertension    No significant past surgical history        Allergies    No Known Allergies    Intolerances        ANTIMICROBIALS:  piperacillin/tazobactam IVPB.. 3.375 every 8 hours      MEDICATIONS  (STANDING):  allopurinol 300 milliGRAM(s) Oral every 12 hours  artificial tears (preservative free) Ophthalmic Solution 1 Drop(s) Both EYES two times a day  atorvastatin 80 milliGRAM(s) Oral at bedtime  dextrose 40% Gel 15 Gram(s) Oral once  dextrose 50% Injectable 25 Gram(s) IV Push once  dextrose 50% Injectable 12.5 Gram(s) IV Push once  dextrose 50% Injectable 25 Gram(s) IV Push once  furosemide   Injectable 40 milliGRAM(s) IV Push once  glucagon  Injectable 1 milliGRAM(s) IntraMuscular once  heparin   Injectable 5000 Unit(s) IV Push once  insulin glargine Injectable (LANTUS) 14 Unit(s) SubCutaneous at bedtime  insulin lispro (ADMELOG) corrective regimen sliding scale   SubCutaneous three times a day before meals  insulin lispro (ADMELOG) corrective regimen sliding scale   SubCutaneous at bedtime  insulin lispro Injectable (ADMELOG) 4 Unit(s) SubCutaneous three times a day before meals  lidocaine 2% Injectable 20 milliLiter(s) Local Injection once  melatonin 3 milliGRAM(s) Oral at bedtime  metoclopramide Injectable 5 milliGRAM(s) IV Push once  metoprolol tartrate 25 milliGRAM(s) Oral two times a day  pantoprazole  Injectable 40 milliGRAM(s) IV Push two times a day  piperacillin/tazobactam IVPB.. 3.375 Gram(s) IV Intermittent every 8 hours  polyethylene glycol 3350 17 Gram(s) Oral daily  senna 2 Tablet(s) Oral at bedtime      Social History  Smoking:  Etoh:  Drug use:      FAMILY HISTORY:  No pertinent family history in first degree relatives        Vital Signs Last 24 Hrs  T(C): 37.3 (2021 09:35), Max: 39.3 (2021 03:29)  T(F): 99.2 (2021 09:35), Max: 102.8 (2021 03:29)  HR: 130 (2021 10:08) (103 - 130)  BP: 114/76 (2021 10:07) (113/62 - 121/75)  BP(mean): --  RR: 18 (2021 09:35) (18 - 20)  SpO2: 97% (2021 10:08) (94% - 100%)    CBC Full  -  ( 2021 06:19 )  WBC Count : 24.48 K/uL  RBC Count : 2.82 M/uL  Hemoglobin : 8.1 g/dL  Hematocrit : 25.0 %  Platelet Count - Automated : 23 K/uL  Mean Cell Volume : 88.7 fl  Mean Cell Hemoglobin : 28.7 pg  Mean Cell Hemoglobin Concentration : 32.4 gm/dL  Auto Neutrophil # : x  Auto Lymphocyte # : x  Auto Monocyte # : x  Auto Eosinophil # : x  Auto Basophil # : x  Auto Neutrophil % : x  Auto Lymphocyte % : x  Auto Monocyte % : x  Auto Eosinophil % : x  Auto Basophil % : x    06-07    129<L>  |  96  |  22  ----------------------------<  234<H>  4.3   |  18<L>  |  1.06    Ca    8.7      2021 06:20  Phos  3.5     06-07  Mg     2.4     06-07    TPro  8.1  /  Alb  3.0<L>  /  TBili  1.8<H>  /  DBili  x   /  AST  39  /  ALT  24  /  AlkPhos  87  06-07    LIVER FUNCTIONS - ( 2021 06:20 )  Alb: 3.0 g/dL / Pro: 8.1 g/dL / ALK PHOS: 87 U/L / ALT: 24 U/L / AST: 39 U/L / GGT: x           Urinalysis Basic - ( 2021 18:12 )    Color: Pale Yellow / Appearance: Clear / S.015 / pH: x  Gluc: x / Ketone: Small  / Bili: Negative / Urobili: Negative   Blood: x / Protein: 30 mg/dL / Nitrite: Negative   Leuk Esterase: Trace / RBC: 0-2 /HPF / WBC 11-25   Sq Epi: x / Non Sq Epi: Few / Bacteria: Occasional        MICROBIOLOGY:  .Sputum Sputum  21 --  --    No polymorphonuclear leukocytes per low power field  Rare Squamous epithelial cells per low power field  No organisms seen per oil power field      .Blood Blood-Peripheral  21   No growth to date.  --  --      .Urine Clean Catch (Midstream)  21   <10,000 CFU/mL Normal Urogenital Merly  --  --              v    Rapid RVP Result: NotDetec ( @ 16:42)            RADIOLOGY   SULTANA EVERETT 85y Female  MRN-[id number]    Patient is a 85y old  Female who presents with a chief complaint of chest pain (2021 13:12)      HPI:  Pt was evaluated prior to midnight on 21.  Hx obtained form patient, chart review, son [place] and daughter Rc Matteo at bedside. Patient Yoruba speaking. Translation provided by Yoruba speaking MD.     84 yo Thai speaking female with a PMHx of CAD w/ TVD (cardiac cath 2019 in [place] revealed LAD 70-80%, OM1 80%,  RCA, family declined CABG at that time), HFpEF, severe AS, HTN, recently dx T2DM (A1C >9), chronic leukocytosis (WBC 13-14 in [place] in ), anemia/thrombocytopenia acutely worsening recently (plt 70s-->20s) w/ blasts, and GERD presenting as transfer from [facility] where she was seen for 5 days of substernal cp that radiates to left jaw, left arm and back w/ associated progressive SANON x 5 days. Found to be septic w/ Tmax 101, WBC 18, 7% bands, 10% blasts, lactate 4.7 --> 3, sinus tach 110 w/ PVCs w/ mildly positive UA (11-25 WBC, trace LE, occasional bacteria) and w/ elevated cardiac enzymes (TropI 0.494, CKMB 4.4, BNP 1790) w/o EKG changes c/f NSTEMI vs. demand ischemia. Evaluated by cardiology. Recommendation to hold hep gtt and DAPT due to thrombocytopenia and transfer to tertiary center for higher acuity of care. Also evaluated by critical care, found to have low EF and apical hypokinesis on bedside POCUS. Per son, no hx of cancer in patient or family. Per daughter, flow cytometry checked in [facility] in  was inconclusive. Blasts noted on CBC within the last week only.     On presentation to [facility], tachypenic to 30. Noted to have wheezing. Temp 98.5,  (sinus tach on tele), 132/73, RR 30, 100% 2L NC    Transferred to [facility] for further care.    having fevers now    Daughter x 2 at bedside helped with history and translation       PAST MEDICAL & SURGICAL HISTORY:  Diabetes mellitus    CAD (coronary artery disease)    Chronic diastolic congestive heart failure    Severe aortic stenosis    Hypertension    No significant past surgical history        Allergies    No Known Allergies    Intolerances        ANTIMICROBIALS:  piperacillin/tazobactam IVPB.. 3.375 every 8 hours      MEDICATIONS  (STANDING):  allopurinol 300 milliGRAM(s) Oral every 12 hours  artificial tears (preservative free) Ophthalmic Solution 1 Drop(s) Both EYES two times a day  atorvastatin 80 milliGRAM(s) Oral at bedtime  dextrose 40% Gel 15 Gram(s) Oral once  dextrose 50% Injectable 25 Gram(s) IV Push once  dextrose 50% Injectable 12.5 Gram(s) IV Push once  dextrose 50% Injectable 25 Gram(s) IV Push once  furosemide   Injectable 40 milliGRAM(s) IV Push once  glucagon  Injectable 1 milliGRAM(s) IntraMuscular once  heparin   Injectable 5000 Unit(s) IV Push once  insulin glargine Injectable (LANTUS) 14 Unit(s) SubCutaneous at bedtime  insulin lispro (ADMELOG) corrective regimen sliding scale   SubCutaneous three times a day before meals  insulin lispro (ADMELOG) corrective regimen sliding scale   SubCutaneous at bedtime  insulin lispro Injectable (ADMELOG) 4 Unit(s) SubCutaneous three times a day before meals  lidocaine 2% Injectable 20 milliLiter(s) Local Injection once  melatonin 3 milliGRAM(s) Oral at bedtime  metoclopramide Injectable 5 milliGRAM(s) IV Push once  metoprolol tartrate 25 milliGRAM(s) Oral two times a day  pantoprazole  Injectable 40 milliGRAM(s) IV Push two times a day  piperacillin/tazobactam IVPB.. 3.375 Gram(s) IV Intermittent every 8 hours  polyethylene glycol 3350 17 Gram(s) Oral daily  senna 2 Tablet(s) Oral at bedtime      Social History  Smoking: no  Etoh: no  Drug use: no      FAMILY HISTORY:  No pertinent family history in first degree relatives        Vital Signs Last 24 Hrs  T(C): 37.3 (2021 09:35), Max: 39.3 (2021 03:29)  T(F): 99.2 (2021 09:35), Max: 102.8 (2021 03:29)  HR: 130 (2021 10:08) (103 - 130)  BP: 114/76 (2021 10:07) (113/62 - 121/75)  BP(mean): --  RR: 18 (2021 09:35) (18 - 20)  SpO2: 97% (2021 10:08) (94% - 100%)    CBC Full  -  ( 2021 06:19 )  WBC Count : 24.48 K/uL  RBC Count : 2.82 M/uL  Hemoglobin : 8.1 g/dL  Hematocrit : 25.0 %  Platelet Count - Automated : 23 K/uL  Mean Cell Volume : 88.7 fl  Mean Cell Hemoglobin : 28.7 pg  Mean Cell Hemoglobin Concentration : 32.4 gm/dL  Auto Neutrophil # : x  Auto Lymphocyte # : x  Auto Monocyte # : x  Auto Eosinophil # : x  Auto Basophil # : x  Auto Neutrophil % : x  Auto Lymphocyte % : x  Auto Monocyte % : x  Auto Eosinophil % : x  Auto Basophil % : x        129<L>  |  96  |  22  ----------------------------<  234<H>  4.3   |  18<L>  |  1.06    Ca    8.7      2021 06:20  Phos  3.5       Mg     2.4         TPro  8.1  /  Alb  3.0<L>  /  TBili  1.8<H>  /  DBili  x   /  AST  39  /  ALT  24  /  AlkPhos  87  07    LIVER FUNCTIONS - ( 2021 06:20 )  Alb: 3.0 g/dL / Pro: 8.1 g/dL / ALK PHOS: 87 U/L / ALT: 24 U/L / AST: 39 U/L / GGT: x           Urinalysis Basic - ( 2021 18:12 )    Color: Pale Yellow / Appearance: Clear / S.015 / pH: x  Gluc: x / Ketone: Small  / Bili: Negative / Urobili: Negative   Blood: x / Protein: 30 mg/dL / Nitrite: Negative   Leuk Esterase: Trace / RBC: 0-2 /HPF / WBC 11-25   Sq Epi: x / Non Sq Epi: Few / Bacteria: Occasional        MICROBIOLOGY:  .Sputum Sputum  21 --  --    No polymorphonuclear leukocytes per low power field  Rare Squamous epithelial cells per low power field  No organisms seen per oil power field      .Blood Blood-Peripheral  21   No growth to date.  --  --      .Urine Clean Catch (Midstream)  21   <10,000 CFU/mL Normal Urogenital Merly  --  --        Rapid RVP Result: NotDetec ( @ 16:42)            RADIOLOGY  < from: Xray Chest 1 View- PORTABLE-Routine (Xray Chest 1 View- PORTABLE-Routine .) (21 @ 14:18) >  The heart is enlarged. Pulmonary edema. No pleural effusion. No pneumothorax. No acute bony pathology could be identified.    < end of copied text >  < from: CT Angio Abdomen and Pelvis w/ IV Cont (21 @ 19:08) >  No aortic dissection.    Eccentric wall thickening involving the distal esophagus. Further evaluation with upper endoscopy is recommended to exclude underlying malignancy/lesion.    < end of copied text >

## 2021-06-07 NOTE — PROVIDER CONTACT NOTE (CRITICAL VALUE NOTIFICATION) - PERSON GIVING RESULT:
Shanell ESPINAL, Lab
Elva Gunn, STAT Lab
Rita Leal from lab
Stephany Rader from  lab
Dat Rosales from core lab
Royal Barnhart from lab
Shanell Peck from Lab

## 2021-06-07 NOTE — PROVIDER CONTACT NOTE (CRITICAL VALUE NOTIFICATION) - ACTION/TREATMENT ORDERED:
continue Zosyn, no new orders at this time, will closely monitor the pt.
no new orders currently, will closely monitor the patient. doctor Youngblood at bedside seeing the pt.
IV Bicarb. Awaiting call from MICU.
RRT team made aware. no new orders. will closely monitor the pt.
CITLALIU made aware.
will closely monitor the pt. continue Zosyn IV
continue CE Q8 hourly. will closely monitor the pt.

## 2021-06-07 NOTE — PROGRESS NOTE ADULT - PROBLEM SELECTOR PLAN 2
Patient w/ Tmax 101, , WBC 18, 7% bands, 10% blasts, lactate 4.7 --> 3. ddx includes sepsis 2/2 UTI vs. ?asp PNA given cough and tracheal secretions w/o focal consolidation on CTA chest vs SIRS 2/2 possible malignancy   -UA w/ 11-25 WBC, trace LE, occasional bacteria  -COVID/RVP negative at [facility] negative  -CXR at [facility] revealing R pulm congestion, no edema   -s/p vanc/zosyn x 1 at  ED  -c/w zosyn for now  -f/u BCx, UCx from [facility]  -check sputum cx  -HOB >30, aspiration precautions  -speech and swallow in AM Patient w/ Tmax 101, , WBC 18, 7% bands, 10% blasts, lactate 4.7 --> 3. ddx includes sepsis 2/2 UTI vs. ?asp PNA given cough and tracheal secretions w/o focal consolidation on CTA chest vs SIRS 2/2 possible malignancy   -UA w/ 11-25 WBC, trace LE, occasional bacteria  -COVID/RVP negative at [facility] negative  -CXR at [facility] revealing R pulm congestion, no edema   -s/p vanc/zosyn x 1 at  ED  -c/w zosyn for now  -BCx (6/5) NGTD  -UA mildly positive w/ 11-25 WBC, trace LE, occasional bacteria; UCx (6/5) negative (normal paula)  -sputum cx normal respiratory paula (6/6)  -HOB >30, aspiration precautions  -infectious diseases consulted (6/7), recs appreciated Patient w/ Tmax 101, , WBC 18, 7% bands, 10% blasts, lactate 4.7 --> 3. ddx includes sepsis 2/2 ?asp PNA given cough and tracheal secretions w/o focal consolidation on CTA chest vs SIRS 2/2 possible malignancy   -COVID/RVP negative at [facility] negative  -CXR at [facility] revealing R pulm congestion, no edema   -s/p vanc/zosyn x 1 at  ED  -c/w zosyn for now  -BCx (6/5) NGTD  -UA mildly positive w/ 11-25 WBC, trace LE, occasional bacteria; UCx (6/5) negative (normal paula)  -sputum cx normal respiratory paula (6/6)  -HOB >30, aspiration precautions  -infectious diseases consulted (6/7), recs appreciated

## 2021-06-07 NOTE — RAPID RESPONSE TEAM SUMMARY - NSSITUATIONBACKGROUNDRRT_GEN_ALL_CORE
This is a 86 yo female with a PMHx of CAD w/ TVD, HFpEF, severe AS, HTN, recently dx T2DM, chronic leukocytosis w/ blasts, anemia, thrombocytopenia, and GERD presenting [facility]  as patient was septic there possibly 2/2 UTI vs aspiration PNA given tracheal secretions on CTAP w/ elevated CE c/f NSTEMI transferred to [facility] for cardiology evaluation. RRT called for hypotension, worsening respiratory status. On arrival to RRT, pt on Bipap with SBP of 70s. Pt started on pressors. Anesthesia called to intubated the patient and transferred to MICU on propofol and Levophed for further monitoring and management.

## 2021-06-07 NOTE — PROVIDER CONTACT NOTE (CRITICAL VALUE NOTIFICATION) - ASSESSMENT
patient is hemodynamically stable
pt received Tylenol for fever, asymptomatic, pt currently on Zosyn
RRT team at bedside and plan for intubation
patient is hemodynamically stable.
Patient on Bipap. RR 40.
patient is hemodynamically stable. Lactate coming down from previous blood draw. pt afebrile
MICU at bedside.

## 2021-06-07 NOTE — CONSULT NOTE ADULT - ATTENDING COMMENTS
Agree with above assessment and plan. CT reviewed - esophageal thickening is mild. Pt. has had some chronic dysphagia, but has not lost weight in the last year. Given other significant illnesses as above, and also chest pain and labored breathing and tachypnea on exam would await workup of these before considering EGD.
85 year old woman with severe AS, history of CAD status post cABG in past with new onset heme issue characterized by thrombocytopenia.  Asked to evaluate today for tachypnea, hypoxemia.  She is currently on BIPAP maintaining good volumes but tachypneic.  She is febrile.  Appears to be in pulmonary edema.  ekg with LBBB.  Patient had refused cardiac cath recently for evaluation of ischemia and is currently thrombocytopenic.  Would conitnue biPAP, diurese cautiously, cover broadly with antibiotics.  Prognosis is guarded given age and comorbidities.
Patient is an 86 y/o (Sami-speaking) F w/ a PMHx of CAD w/ TVD (cardiac cath 2019 in [place] revealed LAD 70-80%, OM1 80%,  RCA, family declined CABG at that time), HFpEF, severe AS, HTN, recently dx T2DM (A1C >9), chronic leukocytosis (WBC 13-14 in [place] in 2020), anemia/thrombocytopenia, and GERD who presented as transfer from [facility] where she was seen for 5 days for substernal chest pain that radiated to left jaw, left arm, and back w/ associated progressive SANON x 5 days, found to have NSTEMI vs demand ischemia, met sepsis criteria, and also found to have a leukocytosis w/ 10% blasts, and was admitted to telemetry for further management. She is noted to have mild leucocytosis with 10% blasts. Also noted to have worsening thrombocytopenia over the last year. BMBX 3 yrs ago was reportedly normal. 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 She is not stable for bmbx due to cardiac issues. Will send PB flow. BMBX when stable. Consider GI evaluation for esophageal findings.
The patient's presentation that led to her acute hospitalization was reviewed.  Her past medical history/past surgical history/family history/social history was gone over.  At the time of examination she is complaining of right upper abdominal discomfort.  She has been evaluated by the hospitalist team.  Over the last few months she has been experiencing increasing shortness of breath, dyspnea upon exertion, fatigue with episodes of chest pain.    She has known severe triple vessel obstructive coronary artery disease and per report low-flow low gradient severe aortic valve stenosis.  Would recommend that the patient be treated medically in an aggressive fashion for her obstructive coronary artery disease.  Recently she was found to have worsening anemia, leukocytosis with blast concern for underlying malignancy.  There is concern that if she was to be administered anticoagulation therapy she would be at increased risk of bleeding.    Due to the patient's acute hematologic issues she is not a candidate at this time to undergo any structural procedure for her aortic valve stenosis.  There is concern that any intervention in this unstable patient may potentially hasten her clinical deterioration.    At this time it was expressed to the patient and her family/daughter that she will need to be further assessed/optimized from an oncologic/hematologic perspective.  If she is clinically doing better would recommend that she undergo PCI of her obstructive coronary arteries.  If she is clinically doing well at that time she can be reassessed for potential intervention on her aortic valve.    A transthoracic echocardiogram study has been ordered.  She is scheduled for a bone marrow biopsy to be performed later today.  Recommend that an ultrasound of her right upper quadrant be undertaken.    EKG, laboratory studies and imaging studies were personally reviewed.    Findings and plan were discussed with hospitalist team and structural heart team.

## 2021-06-07 NOTE — CONSULT NOTE ADULT - PROBLEM SELECTOR RECOMMENDATION 3
Pt w/ chronic leukocytosis, anemia and thrombocytopenia in [place] in 2020, now worsening   Heme/onc following given concern for malignancy   Continue care as per primary team
-no obvious infection at this time  -CT and cx negative  -other potential reasons for leukocytosis include hematological process like leukemia or from her NSTEMI  -trend cbc

## 2021-06-07 NOTE — CONSULT NOTE ADULT - PROBLEM SELECTOR PROBLEM 1
CAD (coronary artery disease)
Nonrheumatic aortic valve stenosis
SIRS (systemic inflammatory response syndrome)

## 2021-06-07 NOTE — CONSULT NOTE ADULT - PROBLEM SELECTOR RECOMMENDATION 2
Pt w/ severe AS - family has refused surgical intervention in the past   Per pt's daughter, family is still not amenable to surgical intervention at this time   No CT surgery intervention at this time given family refusal and pt's current condition/concern for malignancy   Recommend continuation of medical management as per primary team and cardiology
-no obvious infection at this time  -CT and cx negative  -other potential reasons for fever include hematological process like leukemia or from her NSTEMI  -cont empiric zosyn 3.375 gm iv q8 for now  -check nasal MSSA/MRSA PCR  -monitor temps

## 2021-06-07 NOTE — PROGRESS NOTE ADULT - ASSESSMENT
84 yo Maori speaking female with a PMHx of CAD w/ TVD (cardiac cath 2019 in [place] revealed LAD 70-80%, OM1 80%,  RCA, family declined CABG at that time), HFpEF, severe AS, HTN, recently dx T2DM (A1C >9), chronic leukocytosis w/ blasts (WBC 13-14 in [place] in 2020), anemia, thrombocytopenia, and GERD presenting as transfer from [facility] where she was seen for 5 days of substernal cp that radiates to left jaw, left arm and back w/ associated progressive SANON x 5 days found to be septic possibly 2/2 UTI vs aspiration PNA given tracheal secretions on CTAP w/ elevated CE c/f NSTEMI transferred to [facility] for cardiology evaluation.  84 yo Mohawk speaking female with a PMHx of CAD w/ TVD (cardiac cath 2019 in [place] revealed LAD 70-80%, OM1 80%,  RCA, family declined CABG at that time), HFpEF, severe AS, HTN, recently dx T2DM (A1C >9), chronic leukocytosis w/ blasts (WBC 13-14 in [place] in 2020), anemia, thrombocytopenia, and GERD presented as transfer from [facility] where she was seen for 5 days of substernal cp that radiates to left jaw, left arm and back w/ associated progressive SANON x 5 days, found to be septic w/ elevated CE c/f NSTEMI transferred to [facility] for cardiology evaluation.

## 2021-06-07 NOTE — PROGRESS NOTE ADULT - PROBLEM SELECTOR PLAN 1
Patient w/ hx of TVD, declined CABG in past, now p/w 5 days of cp, found to have elevated CE (CKMB 4.4, Trop I 0.494) w/o EKG changes   -EKG at [facility] showing sinus tach 110 w/ LBBB, TWI I, AVL (nonspecific iso LBBB)   - cardiology recs appreciated, CE likely elevated iso demand from sepsis, recommended holding DAPT and hep gtt iso  -per critical care note at [facility], EF appears low w/ basal hypokinesis  -will hold DAPT and hep gtt for now   -cardiology consulted at [facility]  -start atorvastatin 80 mg  -c/w home metoprolol 25 BID   -trend CE q8 hrs   -check TSH, lipids, A1C   -check TTE Patient w/ hx of TVD, declined CABG in past, now p/w 5 days of cp, found to have elevated CE (CKMB 4.4, Trop I 0.494) w/o EKG changes   -EKG at [facility] showing sinus tach 110 w/ LBBB, TWI I, AVL (nonspecific iso LBBB)   - cardiology recs appreciated, CE likely elevated iso demand from sepsis, recommended holding DAPT and hep gtt iso  -per critical care note at [facility], EF appears low w/ basal hypokinesis  -will hold DAPT and hep gtt for now   -cardiology consulted at [facility]  -start atorvastatin 80 mg  -c/w home metoprolol 25 BID   -trend cardiac enzymes  -TSH WNL, lipid panel low HDL, A1C 9.7% (6/6/21)  -bedside POCUS by pulmonology showed severe LV systolic dysfunction (6/7)  -official TTE is pending, had called to expedite this AM Patient w/ hx of TVD, declined CABG in past, now p/w 5 days of cp, found to have elevated CE (CKMB 4.4, Trop I 0.494) w/o EKG changes   -EKG at [facility] showing sinus tach 110 w/ LBBB, TWI I, AVL (nonspecific iso LBBB)   - cardiology recs appreciated, CE likely elevated iso demand from sepsis, recommended holding DAPT and hep gtt iso  -per critical care note at [facility], EF appears low w/ basal hypokinesis  -will hold DAPT and hep gtt for now   -cardiology consulted at [facility]  -start atorvastatin 80 mg  -c/w home metoprolol 25 BID, Lopressor 5 mg IVP q6h for HR>110  -trend cardiac enzymes  -TSH WNL, lipid panel low HDL, A1C 9.7% (6/6/21)  -bedside POCUS by pulmonology showed severe LV systolic dysfunction (6/7)  -official TTE is pending, had called to expedite this AM

## 2021-06-07 NOTE — PROGRESS NOTE ADULT - PROBLEM SELECTOR PLAN 4
Patient tachypneic on presentation to [facility], requiring 2L NC. No hypercapnia on VBG   -likely aspiration PNA given tracheal secretions on CTA w/o focal consolidation, less likely PE as CTA chest neg. ddx also includes fluid overload iso AS and 1L NS  -bipap for inc wob Patient w/ baseline leukocytosis 13-14 since 2019 (in [place]), w/ blasts seen on outpatient w/u c/f possible leukemia vs. MDS   -10% blasts noted during this admission, elevated protein gap as noted   -CTA C/A/P revealing eccentric thickening of distal eso, further w/o to r/o malignancy recommended  -heme/onc recs appreciated, s/p BMBx today (6/6)  -GI consulted (6/5), recs appreciated

## 2021-06-07 NOTE — CONSULT NOTE ADULT - ASSESSMENT
86 yo Occitan speaking female with a PMHx of CAD w/ TVD (cardiac cath 2019 in [place] revealed LAD 70-80%, OM1 80%,  RCA, family declined CABG at that time), HFpEF, severe AS, HTN, recently dx T2DM (A1C >9), chronic leukocytosis (WBC 13-14 in [place] in 2020), anemia/thrombocytopenia acutely worsening recently (plt 70s-->20s) w/ blasts, and GERD presenting as transfer from [facility] where she was seen for 5 days of substernal cp that radiates to left jaw, left arm and back w/ associated progressive SANON x 5 days with fever, leukocytosis, SIRS    Gm Jaeger  Attending Physician   Division of Infectious Disease  Pager #[phone]  Available on Microsoft Teams also  After 5pm/weekend or no response, call #[phone]    D/w Dr. Reyes  D/w daughters at bedside

## 2021-06-07 NOTE — CONSULT NOTE ADULT - SUBJECTIVE AND OBJECTIVE BOX
Structural Heart Team    Daughter translated as per patients request  86 yo Yoruba speaking female with a PMHx of CAD w/ TVD (cardiac cath 2019 in [place] revealed LAD 70-80%, OM1 80%,  RCA, family declined CABG at that time), HFpEF, severe AS, HTN, recently dx T2DM (A1C >9), chronic leukocytosis (WBC 13-14 in [place] in 2020), anemia/thrombocytopenia acutely worsening recently (plt 70s-->20s) w/ blasts, and GERD presenting as transfer from [facility] where she was seen for 5 days of substernal cp that radiates to left jaw, left arm and back w/ associated progressive SANON x 5 days. Found to be septic w/ Tmax 101, WBC 18, 7% bands, 10% blasts, lactate 4.7 --> 3, sinus tach 110 w/ PVCs w/ mildly positive UA (11-25 WBC, trace LE, occasional bacteria) and w/ elevated cardiac enzymes (TropI 0.494, CKMB 4.4, BNP 1790) w/o EKG changes c/f NSTEMI vs. demand ischemia. Evaluated by cardiology. Recommendation to hold hep gtt and DAPT due to thrombocytopenia and transfer to tertiary center for higher acuity of care. Also evaluated by critical care, found to have low EF and apical hypokinesis on bedside POCUS. Per son, no hx of cancer in patient or family. Per daughter, flow cytometry checked in [place] in 2020 was inconclusive. Blasts noted on CBC within the last week only.       On examination today, she was tachypneic and visibly uncomfortable.  Her primary complaint was of RUQ pain and her daughter explained that she does have a history of cholelithiasis.  She was reportedly being evaluated at an outside hospital for PCI/TAVR which was deferred due to her anemia and thrombocytopenia.  The patient and her family have refused CABG in the past.  She presents this admission with substernal chest pain with radiation to the jaw and left arm as well as sanon.        REVIEW OF SYSTEMS:    CONSTITUTIONAL: No weakness, fevers or chills  EYES/ENT: No visual changes;  No vertigo or throat pain   NECK: No pain or stiffness  RESPIRATORY: No cough, wheezing, hemoptysis; No shortness of breath  CARDIOVASCULAR: No chest pain or palpitations  GASTROINTESTINAL: No abdominal or epigastric pain. No nausea, vomiting, or hematemesis; No diarrhea or constipation. No melena or hematochezia.  GENITOURINARY: No dysuria, frequency or hematuria  NEUROLOGICAL: No numbness or weakness  SKIN: No itching, rashes      Allergies    No Known Allergies    Intolerances      Vital Signs Last 24 Hrs  T(C): 37.3 (07 Jun 2021 09:35), Max: 39.3 (07 Jun 2021 03:29)  T(F): 99.2 (07 Jun 2021 09:35), Max: 102.8 (07 Jun 2021 03:29)  HR: 130 (07 Jun 2021 10:08) (103 - 130)  BP: 114/76 (07 Jun 2021 10:07) (113/62 - 121/75)  BP(mean): --  RR: 18 (07 Jun 2021 09:35) (18 - 20)  SpO2: 97% (07 Jun 2021 10:08) (94% - 100%)    MEDICATIONS  (STANDING):  allopurinol 300 milliGRAM(s) Oral every 12 hours  artificial tears (preservative free) Ophthalmic Solution 1 Drop(s) Both EYES two times a day  atorvastatin 80 milliGRAM(s) Oral at bedtime  dextrose 40% Gel 15 Gram(s) Oral once  dextrose 50% Injectable 25 Gram(s) IV Push once  dextrose 50% Injectable 12.5 Gram(s) IV Push once  dextrose 50% Injectable 25 Gram(s) IV Push once  glucagon  Injectable 1 milliGRAM(s) IntraMuscular once  heparin   Injectable 5000 Unit(s) IV Push once  insulin glargine Injectable (LANTUS) 17 Unit(s) SubCutaneous at bedtime  insulin lispro (ADMELOG) corrective regimen sliding scale   SubCutaneous three times a day before meals  insulin lispro (ADMELOG) corrective regimen sliding scale   SubCutaneous at bedtime  insulin lispro Injectable (ADMELOG) 9 Unit(s) SubCutaneous three times a day before meals  lidocaine 2% Injectable 20 milliLiter(s) Local Injection once  melatonin 3 milliGRAM(s) Oral at bedtime  metoprolol tartrate 25 milliGRAM(s) Oral two times a day  pantoprazole  Injectable 40 milliGRAM(s) IV Push two times a day  piperacillin/tazobactam IVPB.. 3.375 Gram(s) IV Intermittent every 8 hours  polyethylene glycol 3350 17 Gram(s) Oral daily  senna 2 Tablet(s) Oral at bedtime  simethicone 80 milliGRAM(s) Chew three times a day      Exam-  General: mild distress, WDWN  Cor: s1s2, RRR, II/VI systolic murmur   EKG: NSR, LBBB   Pulm: Clear, no wheezes, rales, or rhonchi, no use of accessory muscles  Gastrointestinal: soft, nontender, nondistended, +bowel sounds  Extremities: no edema, 2+ DP pulses b/l  Neuro: nonfocal                          8.1    24.48 )-----------( 23       ( 07 Jun 2021 06:19 )             25.0   06-07    129<L>  |  96  |  22  ----------------------------<  234<H>  4.3   |  18<L>  |  1.06    Ca    8.7      07 Jun 2021 06:20  Phos  3.5     06-07  Mg     2.4     06-07    TPro  8.1  /  Alb  3.0<L>  /  TBili  1.8<H>  /  DBili  x   /  AST  39  /  ALT  24  /  AlkPhos  87  06-07  PT/INR - ( 07 Jun 2021 06:19 )   PT: 16.6 sec;   INR: 1.41 ratio         PTT - ( 07 Jun 2021 06:19 )  PTT:29.6 sec  I&O's Summary    06 Jun 2021 07:01  -  07 Jun 2021 07:00  --------------------------------------------------------  IN: 720 mL / OUT: 400 mL / NET: 320 mL

## 2021-06-07 NOTE — PROVIDER CONTACT NOTE (CRITICAL VALUE NOTIFICATION) - BACKGROUND
86 yo Malay speaking female with a PMHx of CAD w/ TVD (cardiac cath 2019 in [place] revealed LAD 70-80%, OM1 80%,  RCA, family declined CABG at that time), HFpEF, severe AS
86 yo Bengali speaking female with a PMHx of CAD w/ TVD (cardiac cath 2019 in [place] revealed LAD 70-80%, OM1 80%,  RCA, family declined CABG at that time), HFpEF, severe AS
admitted for sepsis and NSTEMI
admitted for fever and CAD
admitted for sepsis/NSTEMI
admitted for Fever and CAD
admitted for NSTEMI, Leukocytosis, Bicytopenia, and sepsis,

## 2021-06-07 NOTE — PROVIDER CONTACT NOTE (OTHER) - ASSESSMENT
pt with expiratory wheezing and generalized pain. oral Temp is 99F, body is warm to touch. patient asymptomatic. -120's on tele monitor.

## 2021-06-07 NOTE — PROGRESS NOTE ADULT - PROBLEM SELECTOR PLAN 8
Was pending TAVR as outpatient  -hold further fluids Newly diagnosed, on 0.5 repaglinide but unclear if patient compliant on this   -A1C 9.7%  -low ISS

## 2021-06-07 NOTE — PROVIDER CONTACT NOTE (CHANGE IN STATUS NOTIFICATION) - BACKGROUND
86 yo Malay speaking female with a PMHx of CAD w/ TVD (cardiac cath 2019 in [place] revealed LAD 70-80%, OM1 80%,  RCA, family declined CABG at that time), HFpEF, severe AS,

## 2021-06-07 NOTE — PROCEDURE NOTE - ADDITIONAL PROCEDURE DETAILS
Please remain supine for 30 min post procedure. Do not get gauze wet for 24 hours. Ok to take Tylenol, no advil or motrin.

## 2021-06-07 NOTE — CONSULT NOTE ADULT - ASSESSMENT
Ms Flores is an 84 y/o Kinyarwanda speaking female with a PMHx of CAD w/ TVD (cardiac cath 2019 in [place] revealed LAD 70-80%, OM1 80%,  RCA), HFpEF, severe AS, HTN, DM2, chronic leukocytosis (WBC 13-14 in [place] in 2020), anemia/thrombocytopenia acutely worsening recently (plt 70s-->20s) w/ blasts, and GERD presenting as transfer from [facility] where she was seen for 5 days of substernal cp that radiates to left jaw, left arm and back w/ associated progressive SANON x 5 days.      NYHA II  STS risk for AVR/CABG:

## 2021-06-07 NOTE — PROVIDER CONTACT NOTE (OTHER) - ACTION/TREATMENT ORDERED:
PO Tylenol ordered, and give PRN Duoneb PRN dose now. continue Zosyn IV. will closely monitor the pt.

## 2021-06-07 NOTE — PROGRESS NOTE ADULT - SUBJECTIVE AND OBJECTIVE BOX
INTERVAL HPI/OVERNIGHT EVENTS:  Patient S&E at bedside. Daughter at bedside.  Patient with dyspnea/fever, doesn't feel well.  States prior to this weekend she was not this lethargic or SOB.      VITAL SIGNS:  T(F): 100.3 (21 @ 16:15)  HR: 77 (21 @ 16:15)  BP: 132/66 (21 @ 16:15)  RR: 18 (21 @ 14:55)  SpO2: 100% (21 @ 16:12)  Wt(kg): --    PHYSICAL EXAM:    Constitutional: in mild distress  Eyes: EOMI, sclera non-icteric  Neck: supple, no JVD  Respiratory: +wheezing/rhonchi  Cardiovascular: RRR  Gastrointestinal: soft, NTND  Extremities: no c/c/e      MEDICATIONS  (STANDING):  albuterol/ipratropium for Nebulization 3 milliLiter(s) Nebulizer every 6 hours  allopurinol 300 milliGRAM(s) Oral every 12 hours  artificial tears (preservative free) Ophthalmic Solution 1 Drop(s) Both EYES two times a day  atorvastatin 80 milliGRAM(s) Oral at bedtime  dextrose 40% Gel 15 Gram(s) Oral once  dextrose 50% Injectable 25 Gram(s) IV Push once  dextrose 50% Injectable 12.5 Gram(s) IV Push once  dextrose 50% Injectable 25 Gram(s) IV Push once  glucagon  Injectable 1 milliGRAM(s) IntraMuscular once  heparin   Injectable 5000 Unit(s) IV Push once  insulin glargine Injectable (LANTUS) 17 Unit(s) SubCutaneous at bedtime  insulin lispro (ADMELOG) corrective regimen sliding scale   SubCutaneous three times a day before meals  insulin lispro (ADMELOG) corrective regimen sliding scale   SubCutaneous at bedtime  insulin lispro Injectable (ADMELOG) 9 Unit(s) SubCutaneous three times a day before meals  lidocaine 2% Injectable 20 milliLiter(s) Local Injection once  melatonin 3 milliGRAM(s) Oral at bedtime  metoprolol tartrate 25 milliGRAM(s) Oral two times a day  nitroglycerin    2% Ointment 0.5 Inch(s) Transdermal once  pantoprazole  Injectable 40 milliGRAM(s) IV Push two times a day  piperacillin/tazobactam IVPB.. 3.375 Gram(s) IV Intermittent every 8 hours  polyethylene glycol 3350 17 Gram(s) Oral daily  senna 2 Tablet(s) Oral at bedtime  simethicone 80 milliGRAM(s) Chew three times a day    MEDICATIONS  (PRN):  acetaminophen   Tablet .. 650 milliGRAM(s) Oral every 6 hours PRN Mild Pain (1 - 3), Moderate Pain (4 - 6)  metoprolol tartrate Injectable 5 milliGRAM(s) IV Push every 6 hours PRN HR > 110  traMADol 25 milliGRAM(s) Oral every 6 hours PRN Moderate Pain (4 - 6)      Allergies    No Known Allergies    Intolerances        LABS:                        8.1    24.48 )-----------( 23       ( 2021 06:19 )             25.0     06-07    129<L>  |  96  |  22  ----------------------------<  234<H>  4.3   |  18<L>  |  1.06    Ca    8.7      2021 06:20  Phos  3.5     06-07  Mg     2.4     06-07    TPro  8.1  /  Alb  3.0<L>  /  TBili  1.8<H>  /  DBili  x   /  AST  39  /  ALT  24  /  AlkPhos  87  06-07    PT/INR - ( 2021 06:19 )   PT: 16.6 sec;   INR: 1.41 ratio         PTT - ( 2021 06:19 )  PTT:29.6 sec  Urinalysis Basic - ( 2021 18:12 )    Color: Pale Yellow / Appearance: Clear / S.015 / pH: x  Gluc: x / Ketone: Small  / Bili: Negative / Urobili: Negative   Blood: x / Protein: 30 mg/dL / Nitrite: Negative   Leuk Esterase: Trace / RBC: 0-2 /HPF / WBC 11-25   Sq Epi: x / Non Sq Epi: Few / Bacteria: Occasional        RADIOLOGY & ADDITIONAL TESTS:  Studies reviewed.

## 2021-06-07 NOTE — CONSULT NOTE ADULT - CONSULT REQUESTED DATE/TIME
07-Jun-2021 13:13
07-Jun-2021 13:15
07-Jun-2021 14:30
07-Jun-2021
06-Jun-2021 19:41
06-Jun-2021 09:30
06-Jun-2021 11:59

## 2021-06-07 NOTE — PROVIDER CONTACT NOTE (CRITICAL VALUE NOTIFICATION) - NAME OF MD/NP/PA/DO NOTIFIED:
, Reinier Sloan
Dr. Rylie Kc
Jane Falk
[facility] Dr. Herrera at bedside
Dr Jessica Walden
Dr Jessica Walden
RRT team at bedside, DR. Herrera made aware.

## 2021-06-07 NOTE — PROGRESS NOTE ADULT - SUBJECTIVE AND OBJECTIVE BOX
PROGRESS NOTE:   Pricilla Galvez MD   Internal Medicine PGY-1  [phone]    Patient is a 85y old  Female who presents with a chief complaint of chest pain (2021 09:18)    SUBJECTIVE / OVERNIGHT EVENTS:      MEDICATIONS  (STANDING):  allopurinol 300 milliGRAM(s) Oral every 12 hours  artificial tears (preservative free) Ophthalmic Solution 1 Drop(s) Both EYES two times a day  atorvastatin 80 milliGRAM(s) Oral at bedtime  dextrose 40% Gel 15 Gram(s) Oral once  dextrose 50% Injectable 25 Gram(s) IV Push once  dextrose 50% Injectable 12.5 Gram(s) IV Push once  dextrose 50% Injectable 25 Gram(s) IV Push once  glucagon  Injectable 1 milliGRAM(s) IntraMuscular once  insulin glargine Injectable (LANTUS) 14 Unit(s) SubCutaneous at bedtime  insulin lispro (ADMELOG) corrective regimen sliding scale   SubCutaneous three times a day before meals  insulin lispro (ADMELOG) corrective regimen sliding scale   SubCutaneous at bedtime  insulin lispro Injectable (ADMELOG) 4 Unit(s) SubCutaneous three times a day before meals  melatonin 3 milliGRAM(s) Oral at bedtime  metoprolol tartrate 25 milliGRAM(s) Oral two times a day  pantoprazole  Injectable 40 milliGRAM(s) IV Push two times a day  piperacillin/tazobactam IVPB.. 3.375 Gram(s) IV Intermittent every 8 hours  polyethylene glycol 3350 17 Gram(s) Oral daily  senna 2 Tablet(s) Oral at bedtime    MEDICATIONS  (PRN):  acetaminophen   Tablet .. 650 milliGRAM(s) Oral every 6 hours PRN Mild Pain (1 - 3), Moderate Pain (4 - 6)  albuterol/ipratropium for Nebulization 3 milliLiter(s) Nebulizer every 6 hours PRN Shortness of Breath and/or Wheezing  metoprolol tartrate Injectable 5 milliGRAM(s) IV Push every 6 hours PRN HR > 110    CAPILLARY BLOOD GLUCOSE  POCT Blood Glucose.: 252 mg/dL (2021 09:04)  POCT Blood Glucose.: 220 mg/dL (2021 22:13)  POCT Blood Glucose.: 197 mg/dL (2021 17:53)  POCT Blood Glucose.: 308 mg/dL (2021 13:15)    I&O's Summary    2021 07:01  -  2021 07:00  --------------------------------------------------------  IN: 720 mL / OUT: 400 mL / NET: 320 mL    PHYSICAL EXAM:  Vital Signs Last 24 Hrs  T(C): 37.3 (2021 09:35), Max: 39.3 (2021 03:29)  T(F): 99.2 (2021 09:35), Max: 102.8 (2021 03:29)  HR: 121 (2021 09:35) (97 - 122)  BP: 114/76 (2021 09:35) (103/62 - 121/75)  RR: 18 (2021 09:35) (18 - 24)  SpO2: 96% (2021 09:35) (94% - 100%)                        LABS:                        8.1    24.48 )-----------( 23       ( 2021 06:19 )             25.0     06-07    129<L>  |  96  |  22  ----------------------------<  234<H>  4.3   |  18<L>  |  1.06    Ca    8.7      2021 06:20  Phos  3.5     06-07  Mg     2.4     06-07    TPro  8.1  /  Alb  3.0<L>  /  TBili  1.8<H>  /  DBili  x   /  AST  39  /  ALT  24  /  AlkPhos  87  06-07    PT/INR - ( 2021 06:19 )   PT: 16.6 sec;   INR: 1.41 ratio         PTT - ( 2021 06:19 )  PTT:29.6 sec  CARDIAC MARKERS ( 2021 06:20 )  x     / x     / 132 U/L / x     / 5.7 ng/mL  CARDIAC MARKERS ( 2021 22:48 )  x     / x     / 218 U/L / x     / 10.3 ng/mL  CARDIAC MARKERS ( 2021 14:00 )  x     / x     / 222 U/L / x     / 17.9 ng/mL  CARDIAC MARKERS ( 2021 07:23 )  x     / x     / 212 U/L / x     / 18.2 ng/mL  CARDIAC MARKERS ( 2021 00:38 )  x     / x     / 261 U/L / x     / 30.9 ng/mL  CARDIAC MARKERS ( 2021 16:47 )  .494 ng/mL / x     / x     / x     / 4.4 ng/mL      Urinalysis Basic - ( 2021 18:12 )    Color: Pale Yellow / Appearance: Clear / S.015 / pH: x  Gluc: x / Ketone: Small  / Bili: Negative / Urobili: Negative   Blood: x / Protein: 30 mg/dL / Nitrite: Negative   Leuk Esterase: Trace / RBC: 0-2 /HPF / WBC 11-25   Sq Epi: x / Non Sq Epi: Few / Bacteria: Occasional        Culture - Sputum (collected 2021 18:26)  Source: .Sputum Sputum  Gram Stain (2021 23:46):    No polymorphonuclear leukocytes per low power field    Rare Squamous epithelial cells per low power field    No organisms seen per oil power field    Culture - Blood (collected 2021 23:05)  Source: .Blood Blood-Peripheral  Preliminary Report (2021 01:01):    No growth to date.    Culture - Blood (collected 2021 23:05)  Source: .Blood Blood-Peripheral  Preliminary Report (2021 01:01):    No growth to date.    Culture - Urine (collected 2021 23:04)  Source: .Urine Clean Catch (Midstream)  Final Report (2021 22:30):    <10,000 CFU/mL Normal Urogenital Merly        RADIOLOGY & ADDITIONAL TESTS:  Results Reviewed: Y  Imaging Personally Reviewed: Y  Electrocardiogram Personally Reviewed: Y    COORDINATION OF CARE:  Care Discussed with Consultants/Other Providers [Y/N]:   Prior or Outpatient Records Reviewed [Y/N]:    PROGRESS NOTE:   Pricilla Galvez MD   Internal Medicine PGY-1  [phone]    Patient is a 85y old  Female who presents with a chief complaint of chest pain (2021 09:18)    SUBJECTIVE / OVERNIGHT EVENTS:      MEDICATIONS  (STANDING):  allopurinol 300 milliGRAM(s) Oral every 12 hours  artificial tears (preservative free) Ophthalmic Solution 1 Drop(s) Both EYES two times a day  atorvastatin 80 milliGRAM(s) Oral at bedtime  dextrose 40% Gel 15 Gram(s) Oral once  dextrose 50% Injectable 25 Gram(s) IV Push once  dextrose 50% Injectable 12.5 Gram(s) IV Push once  dextrose 50% Injectable 25 Gram(s) IV Push once  glucagon  Injectable 1 milliGRAM(s) IntraMuscular once  insulin glargine Injectable (LANTUS) 14 Unit(s) SubCutaneous at bedtime  insulin lispro (ADMELOG) corrective regimen sliding scale   SubCutaneous three times a day before meals  insulin lispro (ADMELOG) corrective regimen sliding scale   SubCutaneous at bedtime  insulin lispro Injectable (ADMELOG) 4 Unit(s) SubCutaneous three times a day before meals  melatonin 3 milliGRAM(s) Oral at bedtime  metoprolol tartrate 25 milliGRAM(s) Oral two times a day  pantoprazole  Injectable 40 milliGRAM(s) IV Push two times a day  piperacillin/tazobactam IVPB.. 3.375 Gram(s) IV Intermittent every 8 hours  polyethylene glycol 3350 17 Gram(s) Oral daily  senna 2 Tablet(s) Oral at bedtime    MEDICATIONS  (PRN):  acetaminophen   Tablet .. 650 milliGRAM(s) Oral every 6 hours PRN Mild Pain (1 - 3), Moderate Pain (4 - 6)  albuterol/ipratropium for Nebulization 3 milliLiter(s) Nebulizer every 6 hours PRN Shortness of Breath and/or Wheezing  metoprolol tartrate Injectable 5 milliGRAM(s) IV Push every 6 hours PRN HR > 110    CAPILLARY BLOOD GLUCOSE  POCT Blood Glucose.: 252 mg/dL (2021 09:04)  POCT Blood Glucose.: 220 mg/dL (2021 22:13)  POCT Blood Glucose.: 197 mg/dL (2021 17:53)  POCT Blood Glucose.: 308 mg/dL (2021 13:15)    I&O's Summary    2021 07:01  -  2021 07:00  --------------------------------------------------------  IN: 720 mL / OUT: 400 mL / NET: 320 mL    PHYSICAL EXAM:  Vital Signs Last 24 Hrs  T(C): 37.3 (2021 09:35), Max: 39.3 (2021 03:29)  T(F): 99.2 (2021 09:35), Max: 102.8 (2021 03:29)  HR: 121 (2021 09:35) (97 - 122)  BP: 114/76 (2021 09:35) (103/62 - 121/75)  RR: 18 (2021 09:35) (18 - 24)  SpO2: 96% (2021 09:35) (94% - 100%)    GENERAL: Elderly female, ill appearing, NAD  SKIN: No rashes.  HEENT: NCAT, PERRL, MMM, poor dentition  LUNGS: Tachypneic on nasal cannula. Auscultation limited 2/2 shallow breaths. Bibasilar crackles b/l.  CV: RRR, +S1S2  ABDOMEN: Obese abdomen. +BS, soft NTND  EXTREMITIES: no peripheral edema, distal pulses intact  NEURO: A&Ox2 person, hospital. Answering questions, following commands. MAEx4, no gross focal neurologic deficits.  PSYCH: normal mood and affect    LABS:              8.1    24.48 )-----------( 23       ( 2021 06:19 )             25.0     06-07    129<L>  |  96  |  22  ----------------------------<  234<H>  4.3   |  18<L>  |  1.06    Ca    8.7      2021 06:20  Phos  3.5     06-07  Mg     2.4     06-07    TPro  8.1  /  Alb  3.0<L>  /  TBili  1.8<H>  /  DBili  x   /  AST  39  /  ALT  24  /  AlkPhos  87  06-07    PT/INR - ( 2021 06:19 )   PT: 16.6 sec;   INR: 1.41 ratio         PTT - ( 2021 06:19 )  PTT:29.6 sec  CARDIAC MARKERS ( 2021 06:20 )  x     / x     / 132 U/L / x     / 5.7 ng/mL  CARDIAC MARKERS ( 2021 22:48 )  x     / x     / 218 U/L / x     / 10.3 ng/mL  CARDIAC MARKERS ( 2021 14:00 )  x     / x     / 222 U/L / x     / 17.9 ng/mL  CARDIAC MARKERS ( 2021 07:23 )  x     / x     / 212 U/L / x     / 18.2 ng/mL  CARDIAC MARKERS ( 2021 00:38 )  x     / x     / 261 U/L / x     / 30.9 ng/mL  CARDIAC MARKERS ( 2021 16:47 )  .494 ng/mL / x     / x     / x     / 4.4 ng/mL      Urinalysis Basic - ( 2021 18:12 )    Color: Pale Yellow / Appearance: Clear / S.015 / pH: x  Gluc: x / Ketone: Small  / Bili: Negative / Urobili: Negative   Blood: x / Protein: 30 mg/dL / Nitrite: Negative   Leuk Esterase: Trace / RBC: 0-2 /HPF / WBC 11-25   Sq Epi: x / Non Sq Epi: Few / Bacteria: Occasional        Culture - Sputum (collected 2021 18:26)  Source: .Sputum Sputum  Gram Stain (2021 23:46):    No polymorphonuclear leukocytes per low power field    Rare Squamous epithelial cells per low power field    No organisms seen per oil power field    Culture - Blood (collected 2021 23:05)  Source: .Blood Blood-Peripheral  Preliminary Report (2021 01:01):    No growth to date.    Culture - Blood (collected 2021 23:05)  Source: .Blood Blood-Peripheral  Preliminary Report (2021 01:01):    No growth to date.    Culture - Urine (collected 2021 23:04)  Source: .Urine Clean Catch (Midstream)  Final Report (2021 22:30):    <10,000 CFU/mL Normal Urogenital Merly    RADIOLOGY & ADDITIONAL TESTS:  Results Reviewed:   Imaging Personally Reviewed:   Electrocardiogram Personally Reviewed:     COORDINATION OF CARE:  Care Discussed with Consultants/Other Providers [Y/N]:   Prior or Outpatient Records Reviewed [Y/N]:  PROGRESS NOTE:   Pricilla Galvez MD   Internal Medicine PGY-1  [phone]    Patient is a 85y old  Female who presents with a chief complaint of chest pain (2021 09:18)    SUBJECTIVE / OVERNIGHT EVENTS: Overnight troponin downtrended to 500s, in AM up to 600s. Patient given suppository for constipation (last BM 2 days ago) and subsequently had BM. Patient seen in AM, daughter AMY Felipe present at bedside translated. Patient slept well overnight, still has pain in abdomen and back. At the time, denied chills, chest pain, SOB, nausea/vomiting.    Later in AM, called to bedside by RN as patient appeared uncomfortable. Patient seen at bedside and was complaining of chills and back pain. Rectal temperature checked, 103.2. Given IV Tylenol x 1.    ID and pulmonology consulted. Heme/onc performed bone marrow biopsy in afternoon.    Subsequently in afternoon called to beside by RN again for "severe chest pain". Patient seen at bedside with Dr. Falk. Patient's daughter still present at bedside, translating. Patient hemodynamically stable but appeared uncomfortable with increased work of breathing, also and endorsed chest pain. EKG performed appeared similar to previous EKGs. Patient was placed on BIPAP. Cardiology and ICU were called to evaluate patient. Pulmonology team present at bedside performed a bedside echo showing severe LV dysfunction. Transdermal nitroglycerin was ordered for pain.    MEDICATIONS  (STANDING):  allopurinol 300 milliGRAM(s) Oral every 12 hours  artificial tears (preservative free) Ophthalmic Solution 1 Drop(s) Both EYES two times a day  atorvastatin 80 milliGRAM(s) Oral at bedtime  dextrose 40% Gel 15 Gram(s) Oral once  dextrose 50% Injectable 25 Gram(s) IV Push once  dextrose 50% Injectable 12.5 Gram(s) IV Push once  dextrose 50% Injectable 25 Gram(s) IV Push once  glucagon  Injectable 1 milliGRAM(s) IntraMuscular once  insulin glargine Injectable (LANTUS) 14 Unit(s) SubCutaneous at bedtime  insulin lispro (ADMELOG) corrective regimen sliding scale   SubCutaneous three times a day before meals  insulin lispro (ADMELOG) corrective regimen sliding scale   SubCutaneous at bedtime  insulin lispro Injectable (ADMELOG) 4 Unit(s) SubCutaneous three times a day before meals  melatonin 3 milliGRAM(s) Oral at bedtime  metoprolol tartrate 25 milliGRAM(s) Oral two times a day  pantoprazole  Injectable 40 milliGRAM(s) IV Push two times a day  piperacillin/tazobactam IVPB.. 3.375 Gram(s) IV Intermittent every 8 hours  polyethylene glycol 3350 17 Gram(s) Oral daily  senna 2 Tablet(s) Oral at bedtime    MEDICATIONS  (PRN):  acetaminophen   Tablet .. 650 milliGRAM(s) Oral every 6 hours PRN Mild Pain (1 - 3), Moderate Pain (4 - 6)  albuterol/ipratropium for Nebulization 3 milliLiter(s) Nebulizer every 6 hours PRN Shortness of Breath and/or Wheezing  metoprolol tartrate Injectable 5 milliGRAM(s) IV Push every 6 hours PRN HR > 110    CAPILLARY BLOOD GLUCOSE  POCT Blood Glucose.: 252 mg/dL (2021 09:04)  POCT Blood Glucose.: 220 mg/dL (2021 22:13)  POCT Blood Glucose.: 197 mg/dL (2021 17:53)  POCT Blood Glucose.: 308 mg/dL (2021 13:15)    I&O's Summary    2021 07:01  -  2021 07:00  --------------------------------------------------------  IN: 720 mL / OUT: 400 mL / NET: 320 mL    PHYSICAL EXAM:  Vital Signs Last 24 Hrs  T(C): 37.3 (2021 09:35), Max: 39.3 (2021 03:29)  T(F): 99.2 (2021 09:35), Max: 102.8 (2021 03:29)  HR: 121 (2021 09:35) (97 - 122)  BP: 114/76 (2021 09:35) (103/62 - 121/75)  RR: 18 (2021 09:35) (18 - 24)  SpO2: 96% (2021 09:35) (94% - 100%)    GENERAL: Elderly female, ill appearing, uncomfortable appearing at times  SKIN: No rashes  HEENT: NCAT, PERRL, MMM  LUNGS: Tachypneic on nasal cannula (in evening BIPAP replaced due to increased work of breathing). Auscultation limited 2/2 very shallow breaths but some crackles were heard b/l.  CV: RRR, +S1S2  ABDOMEN: Obese abdomen. +BS, soft NTND  EXTREMITIES: no peripheral edema, distal pulses intact  NEURO: A&Ox2 person, hospital. Answering questions, following commands. MAEx4, no gross focal neurologic deficits.  PSYCH: normal mood and affect    LABS:              8.1    24.48 )-----------( 23       ( 2021 06:19 )             25.0     06-    129<L>  |  96  |  22  ----------------------------<  234<H>  4.3   |  18<L>  |  1.06    Ca    8.7      2021 06:20  Phos  3.5     06-07  Mg     2.4     06-07    TPro  8.1  /  Alb  3.0<L>  /  TBili  1.8<H>  /  DBili  x   /  AST  39  /  ALT  24  /  AlkPhos  87  06-07    PT/INR - ( 2021 06:19 )   PT: 16.6 sec;   INR: 1.41 ratio      PTT - ( 2021 06:19 )  PTT:29.6 sec  CARDIAC MARKERS ( 2021 06:20 )  x     / x     / 132 U/L / x     / 5.7 ng/mL  CARDIAC MARKERS ( 2021 22:48 )  x     / x     / 218 U/L / x     / 10.3 ng/mL  CARDIAC MARKERS ( 2021 14:00 )  x     / x     / 222 U/L / x     / 17.9 ng/mL  CARDIAC MARKERS ( 2021 07:23 )  x     / x     / 212 U/L / x     / 18.2 ng/mL  CARDIAC MARKERS ( 2021 00:38 )  x     / x     / 261 U/L / x     / 30.9 ng/mL  CARDIAC MARKERS ( 2021 16:47 )  .494 ng/mL / x     / x     / x     / 4.4 ng/mL    Urinalysis Basic - ( 2021 18:12 )  Color: Pale Yellow / Appearance: Clear / S.015 / pH: x  Gluc: x / Ketone: Small  / Bili: Negative / Urobili: Negative   Blood: x / Protein: 30 mg/dL / Nitrite: Negative   Leuk Esterase: Trace / RBC: 0-2 /HPF / WBC 11-25   Sq Epi: x / Non Sq Epi: Few / Bacteria: Occasional    Culture - Sputum (collected 2021 18:26)  Source: .Sputum Sputum  Gram Stain (2021 23:46):    No polymorphonuclear leukocytes per low power field    Rare Squamous epithelial cells per low power field    No organisms seen per oil power field    Culture - Blood (collected 2021 23:05)  Source: .Blood Blood-Peripheral  Preliminary Report (2021 01:01):    No growth to date.    Culture - Blood (collected 2021 23:05)  Source: .Blood Blood-Peripheral  Preliminary Report (2021 01:01):    No growth to date.    Culture - Urine (collected 2021 23:04)  Source: .Urine Clean Catch (Midstream)  Final Report (2021 22:30):    <10,000 CFU/mL Normal Urogenital Merly    RADIOLOGY & ADDITIONAL TESTS:  Results Reviewed:   Imaging Personally Reviewed:   Electrocardiogram Personally Reviewed:     COORDINATION OF CARE:  Care Discussed with Consultants/Other Providers [Y/N]:   Prior or Outpatient Records Reviewed [Y/N]:  PROGRESS NOTE:   Pricilla Galvez MD   Internal Medicine PGY-1  [phone]    Patient is a 85y old  Female who presents with a chief complaint of chest pain (2021 09:18)    SUBJECTIVE / OVERNIGHT EVENTS: Overnight troponin downtrended to 500s, in AM up to 600s. Patient given suppository for constipation (last BM 2 days ago) and subsequently had BM. Patient seen in AM, daughter AMY Felipe present at bedside translated. Patient slept well overnight, still has pain in abdomen and back. At the time, denied chills, chest pain, SOB, nausea/vomiting.    Later in AM, called to bedside by RN as patient appeared uncomfortable. Patient seen at bedside and was complaining of chills and back pain. Rectal temperature checked, 103.2. Given IV Tylenol x 1.    ID and pulmonology consulted. Heme/onc performed bone marrow biopsy in afternoon.    Subsequently in afternoon called to beside by RN again for "severe chest pain". Patient seen at bedside with Dr. Falk. Patient's daughter still present at bedside, translating. Patient hemodynamically stable but appeared uncomfortable with increased work of breathing, and also endorsed chest pain, unable to describe. EKG performed appeared similar to previous EKGs. Patient was placed on BIPAP. Cardiology/ICU were called to evaluate patient. Pulmonology team present at bedside performed a bedside echo showing severe LV dysfunction. Transdermal nitroglycerin ordered for pain.    MEDICATIONS  (STANDING):  allopurinol 300 milliGRAM(s) Oral every 12 hours  artificial tears (preservative free) Ophthalmic Solution 1 Drop(s) Both EYES two times a day  atorvastatin 80 milliGRAM(s) Oral at bedtime  dextrose 40% Gel 15 Gram(s) Oral once  dextrose 50% Injectable 25 Gram(s) IV Push once  dextrose 50% Injectable 12.5 Gram(s) IV Push once  dextrose 50% Injectable 25 Gram(s) IV Push once  glucagon  Injectable 1 milliGRAM(s) IntraMuscular once  insulin glargine Injectable (LANTUS) 14 Unit(s) SubCutaneous at bedtime  insulin lispro (ADMELOG) corrective regimen sliding scale   SubCutaneous three times a day before meals  insulin lispro (ADMELOG) corrective regimen sliding scale   SubCutaneous at bedtime  insulin lispro Injectable (ADMELOG) 4 Unit(s) SubCutaneous three times a day before meals  melatonin 3 milliGRAM(s) Oral at bedtime  metoprolol tartrate 25 milliGRAM(s) Oral two times a day  pantoprazole  Injectable 40 milliGRAM(s) IV Push two times a day  piperacillin/tazobactam IVPB.. 3.375 Gram(s) IV Intermittent every 8 hours  polyethylene glycol 3350 17 Gram(s) Oral daily  senna 2 Tablet(s) Oral at bedtime    MEDICATIONS  (PRN):  acetaminophen   Tablet .. 650 milliGRAM(s) Oral every 6 hours PRN Mild Pain (1 - 3), Moderate Pain (4 - 6)  albuterol/ipratropium for Nebulization 3 milliLiter(s) Nebulizer every 6 hours PRN Shortness of Breath and/or Wheezing  metoprolol tartrate Injectable 5 milliGRAM(s) IV Push every 6 hours PRN HR > 110    CAPILLARY BLOOD GLUCOSE  POCT Blood Glucose.: 252 mg/dL (2021 09:04)  POCT Blood Glucose.: 220 mg/dL (2021 22:13)  POCT Blood Glucose.: 197 mg/dL (2021 17:53)  POCT Blood Glucose.: 308 mg/dL (2021 13:15)    I&O's Summary    2021 07:01  -  2021 07:00  --------------------------------------------------------  IN: 720 mL / OUT: 400 mL / NET: 320 mL    PHYSICAL EXAM:  Vital Signs Last 24 Hrs  T(C): 37.3 (2021 09:35), Max: 39.3 (2021 03:29)  T(F): 99.2 (2021 09:35), Max: 102.8 (2021 03:29)  HR: 121 (2021 09:35) (97 - 122)  BP: 114/76 (2021 09:35) (103/62 - 121/75)  RR: 18 (2021 09:35) (18 - 24)  SpO2: 96% (2021 09:35) (94% - 100%)    GENERAL: Elderly female, ill appearing, uncomfortable appearing at times  SKIN: No rashes  HEENT: NCAT, PERRL, MMM  LUNGS: Tachypneic on nasal cannula (in evening BIPAP replaced due to increased work of breathing). Auscultation limited 2/2 very shallow breaths but some crackles were heard b/l.  CV: RRR, +S1S2  ABDOMEN: Obese abdomen. +BS, soft NTND  EXTREMITIES: no peripheral edema, distal pulses intact  NEURO: A&Ox2 person, hospital. Answering questions, following commands. MAEx4, no gross focal neurologic deficits.  PSYCH: normal mood and affect    LABS:              8.1    24.48 )-----------( 23       ( 2021 06:19 )             25.0     06-07    129<L>  |  96  |  22  ----------------------------<  234<H>  4.3   |  18<L>  |  1.06    Ca    8.7      2021 06:20  Phos  3.5     06-07  Mg     2.4     06-07    TPro  8.1  /  Alb  3.0<L>  /  TBili  1.8<H>  /  DBili  x   /  AST  39  /  ALT  24  /  AlkPhos  87  06-07    PT/INR - ( 2021 06:19 )   PT: 16.6 sec;   INR: 1.41 ratio      PTT - ( 2021 06:19 )  PTT:29.6 sec  CARDIAC MARKERS ( 2021 06:20 )  x     / x     / 132 U/L / x     / 5.7 ng/mL  CARDIAC MARKERS ( 2021 22:48 )  x     / x     / 218 U/L / x     / 10.3 ng/mL  CARDIAC MARKERS ( 2021 14:00 )  x     / x     / 222 U/L / x     / 17.9 ng/mL  CARDIAC MARKERS ( 2021 07:23 )  x     / x     / 212 U/L / x     / 18.2 ng/mL  CARDIAC MARKERS ( 2021 00:38 )  x     / x     / 261 U/L / x     / 30.9 ng/mL  CARDIAC MARKERS ( 2021 16:47 )  .494 ng/mL / x     / x     / x     / 4.4 ng/mL    Urinalysis Basic - ( 2021 18:12 )  Color: Pale Yellow / Appearance: Clear / S.015 / pH: x  Gluc: x / Ketone: Small  / Bili: Negative / Urobili: Negative   Blood: x / Protein: 30 mg/dL / Nitrite: Negative   Leuk Esterase: Trace / RBC: 0-2 /HPF / WBC 11-25   Sq Epi: x / Non Sq Epi: Few / Bacteria: Occasional    Culture - Sputum (collected 2021 18:26)  Source: .Sputum Sputum  Gram Stain (2021 23:46):    No polymorphonuclear leukocytes per low power field    Rare Squamous epithelial cells per low power field    No organisms seen per oil power field    Culture - Blood (collected 2021 23:05)  Source: .Blood Blood-Peripheral  Preliminary Report (2021 01:01):    No growth to date.    Culture - Blood (collected 2021 23:05)  Source: .Blood Blood-Peripheral  Preliminary Report (2021 01:01):    No growth to date.    Culture - Urine (collected 2021 23:04)  Source: .Urine Clean Catch (Midstream)  Final Report (2021 22:30):    <10,000 CFU/mL Normal Urogenital Merly    RADIOLOGY & ADDITIONAL TESTS:  Results Reviewed:   Imaging Personally Reviewed:   Electrocardiogram Personally Reviewed:     COORDINATION OF CARE:  Care Discussed with Consultants/Other Providers [Y/N]:   Prior or Outpatient Records Reviewed [Y/N]:  PROGRESS NOTE:   Pricilla Galvez MD   Internal Medicine PGY-1  [phone]    Patient is a 85y old  Female who presents with a chief complaint of chest pain (2021 09:18)    SUBJECTIVE / OVERNIGHT EVENTS: Overnight troponin downtrended to 500s, in AM up to 600s. Patient given suppository for constipation (last BM 2 days ago) and subsequently had BM. Overnight telemetry: sinus tach 100-110 with trigeminy and PVCs. Patient seen in AM, daughter AMY Matteo present at bedside translated. Patient slept well overnight, still has pain in abdomen and back. At the time, denied chills, chest pain, SOB on nasal cannula, nausea/vomiting.     Events later AM through 5PM:  ID and pulmonology consulted    In afternoon, called to bedside by RN as patient appeared uncomfortable. Patient seen at bedside and was complaining of chills and back pain. Rectal temperature checked, 103.2. Given IV Tylenol x 1.    Heme/onc performed bone marrow biopsy.    Subsequently in late afternoon called to beside by RN again due to patient complaining of "severe chest pain". Patient seen at bedside with Dr. Falk. Patient's daughter Dr. Felipe still present at bedside translating. Patient appeared uncomfortable with increased work of breathing, and also endorsed chest pain, unable to describe. BP stable. EKG performed appeared similar to previous EKGs. Patient was placed on BIPAP. Cardiology and MICU were called to evaluate the patient. Pulmonology team present at bedside performed a bedside echo showing severe LV dysfunction. Transdermal nitroglycerin was ordered for pain. Repeat labs ordered.    MEDICATIONS  (STANDING):  allopurinol 300 milliGRAM(s) Oral every 12 hours  artificial tears (preservative free) Ophthalmic Solution 1 Drop(s) Both EYES two times a day  atorvastatin 80 milliGRAM(s) Oral at bedtime  dextrose 40% Gel 15 Gram(s) Oral once  dextrose 50% Injectable 25 Gram(s) IV Push once  dextrose 50% Injectable 12.5 Gram(s) IV Push once  dextrose 50% Injectable 25 Gram(s) IV Push once  glucagon  Injectable 1 milliGRAM(s) IntraMuscular once  insulin glargine Injectable (LANTUS) 14 Unit(s) SubCutaneous at bedtime  insulin lispro (ADMELOG) corrective regimen sliding scale   SubCutaneous three times a day before meals  insulin lispro (ADMELOG) corrective regimen sliding scale   SubCutaneous at bedtime  insulin lispro Injectable (ADMELOG) 4 Unit(s) SubCutaneous three times a day before meals  melatonin 3 milliGRAM(s) Oral at bedtime  metoprolol tartrate 25 milliGRAM(s) Oral two times a day  pantoprazole  Injectable 40 milliGRAM(s) IV Push two times a day  piperacillin/tazobactam IVPB.. 3.375 Gram(s) IV Intermittent every 8 hours  polyethylene glycol 3350 17 Gram(s) Oral daily  senna 2 Tablet(s) Oral at bedtime    MEDICATIONS  (PRN):  acetaminophen   Tablet .. 650 milliGRAM(s) Oral every 6 hours PRN Mild Pain (1 - 3), Moderate Pain (4 - 6)  albuterol/ipratropium for Nebulization 3 milliLiter(s) Nebulizer every 6 hours PRN Shortness of Breath and/or Wheezing  metoprolol tartrate Injectable 5 milliGRAM(s) IV Push every 6 hours PRN HR > 110    CAPILLARY BLOOD GLUCOSE  POCT Blood Glucose.: 252 mg/dL (2021 09:04)  POCT Blood Glucose.: 220 mg/dL (2021 22:13)  POCT Blood Glucose.: 197 mg/dL (2021 17:53)  POCT Blood Glucose.: 308 mg/dL (2021 13:15)    I&O's Summary    2021 07:01  -  2021 07:00  --------------------------------------------------------  IN: 720 mL / OUT: 400 mL / NET: 320 mL    PHYSICAL EXAM:  Vital Signs Last 24 Hrs  T(C): 37.3 (2021 09:35), Max: 39.3 (2021 03:29)  T(F): 99.2 (2021 09:35), Max: 102.8 (2021 03:29)  HR: 121 (2021 09:35) (97 - 122)  BP: 114/76 (2021 09:35) (103/62 - 121/75)  RR: 18 (2021 09:35) (18 - 24)  SpO2: 96% (2021 09:35) (94% - 100%)    GENERAL: Elderly female, ill appearing, uncomfortable appearing at times  SKIN: No rashes  HEENT: NCAT, PERRL, MMM  LUNGS: Tachypneic on nasal cannula (in evening BIPAP replaced due to increased work of breathing). Auscultation limited 2/2 very shallow breaths but some crackles were heard b/l.  CV: RRR, +S1S2  ABDOMEN: Obese abdomen. +BS, soft NTND  EXTREMITIES: no peripheral edema, distal pulses intact  NEURO: A&Ox2 person, hospital. Answering questions, following commands. MAEx4, no gross focal neurologic deficits.  PSYCH: normal mood and affect    LABS:              8.1    24.48 )-----------( 23       ( 2021 06:19 )             25.0     06-07    129<L>  |  96  |  22  ----------------------------<  234<H>  4.3   |  18<L>  |  1.06    Ca    8.7      2021 06:20  Phos  3.5     06-07  Mg     2.4     06-07    TPro  8.1  /  Alb  3.0<L>  /  TBili  1.8<H>  /  DBili  x   /  AST  39  /  ALT  24  /  AlkPhos  87  06-07    PT/INR - ( 2021 06:19 )   PT: 16.6 sec;   INR: 1.41 ratio      PTT - ( 2021 06:19 )  PTT:29.6 sec  CARDIAC MARKERS ( 2021 06:20 )  x     / x     / 132 U/L / x     / 5.7 ng/mL  CARDIAC MARKERS ( 2021 22:48 )  x     / x     / 218 U/L / x     / 10.3 ng/mL  CARDIAC MARKERS ( 2021 14:00 )  x     / x     / 222 U/L / x     / 17.9 ng/mL  CARDIAC MARKERS ( 2021 07:23 )  x     / x     / 212 U/L / x     / 18.2 ng/mL  CARDIAC MARKERS ( 2021 00:38 )  x     / x     / 261 U/L / x     / 30.9 ng/mL  CARDIAC MARKERS ( 2021 16:47 )  .494 ng/mL / x     / x     / x     / 4.4 ng/mL    Urinalysis Basic - ( 2021 18:12 )  Color: Pale Yellow / Appearance: Clear / S.015 / pH: x  Gluc: x / Ketone: Small  / Bili: Negative / Urobili: Negative   Blood: x / Protein: 30 mg/dL / Nitrite: Negative   Leuk Esterase: Trace / RBC: 0-2 /HPF / WBC 11-25   Sq Epi: x / Non Sq Epi: Few / Bacteria: Occasional    Culture - Sputum (collected 2021 18:26)  Source: .Sputum Sputum  Gram Stain (2021 23:46):    No polymorphonuclear leukocytes per low power field    Rare Squamous epithelial cells per low power field    No organisms seen per oil power field    Culture - Blood (collected 2021 23:05)  Source: .Blood Blood-Peripheral  Preliminary Report (2021 01:01):    No growth to date.    Culture - Blood (collected 2021 23:05)  Source: .Blood Blood-Peripheral  Preliminary Report (2021 01:01):    No growth to date.    Culture - Urine (collected 2021 23:04)  Source: .Urine Clean Catch (Midstream)  Final Report (2021 22:30):    <10,000 CFU/mL Normal Urogenital Merly    RADIOLOGY & ADDITIONAL TESTS:  Results Reviewed:   Imaging Personally Reviewed:   Electrocardiogram Personally Reviewed:     COORDINATION OF CARE:  Care Discussed with Consultants/Other Providers [Y/N]:   Prior or Outpatient Records Reviewed [Y/N]:

## 2021-06-07 NOTE — CHART NOTE - NSCHARTNOTEFT_GEN_A_CORE
Called to patient's bedside for intubation.  Therapy initiated by primary medical team.    Patient Assessment:     Medications Administered: 4mg Etomidate; 50mg Rocuronium    Intubation:      [X] Direct Laryngoscopy    [ ] Video-Assisted Laryngoscopy   [ ] Fiberoptic Intubation    Blade: MAC 4-->MAC3  Cormack-Lehane View: [ ] 1     [ ] 2A     [ ] 2B     [X] 3     [ ]  4  ETT Size: 7.5  Marking at Teeth: 21cm    Post-Intubation Vital Signs: Stable    Positive end-tidal carbon dioxide via EasyCap, positive bilateral breath sounds.  CXR to be reviewed by primary team.  Atraumatic intubation with no complications.

## 2021-06-08 NOTE — PROGRESS NOTE ADULT - ASSESSMENT
84 yo Amharic speaking female with a PMHx of CAD w/ TVD (cardiac cath 2019 in [place] revealed LAD 70-80%, OM1 80%,  RCA, family declined CABG at that time), HFpEF, severe AS, HTN, recently dx T2DM (A1C >9), chronic leukocytosis w/ blasts (WBC 13-14 in [place] in 2020), anemia, thrombocytopenia, and GERD presenting as transfer from [facility] where she was seen for 5 days of substernal cp that radiates to left jaw, left arm and back w/ associated progressive SANON x 5 days found to be septic possibly 2/2 UTI vs aspiration PNA given tracheal secretions on CTAP w/ elevated CE c/f NSTEMI transferred to [facility] for cardiology evaluation.      Problem/Plan - 1:  ·  Problem: NSTEMI (non-ST elevated myocardial infarction).  Plan: Patient w/ hx of TVD, declined CABG in past, now p/w 5 days of cp, found to have elevated CE (CKMB 4.4, Trop I 0.494) w/o EKG changes   -EKG at [facility] showing sinus tach 110 w/ LBBB, TWI I, AVL (nonspecific iso LBBB)   - cardiology recs appreciated, CE likely elevated iso demand from sepsis, recommended holding DAPT and hep gtt iso  -per critical care note at [facility], EF appears low w/ basal hypokinesis  -will hold DAPT and hep gtt for now   -cardiology consulted at [facility]  -start atorvastatin 80 mg  -c/w home metoprolol 25 BID   -trend CE q8 hrs   -check TSH, lipids, A1C   -check TTE.      Problem/Plan - 2:  ·  Problem: Sepsis.  Plan: Patient w/ Tmax 101, , WBC 18, 7% bands, 10% blasts, lactate 4.7 --> 3. ddx includes sepsis 2/2 UTI vs. ?asp PNA given cough and tracheal secretions w/o focal consolidation on CTA chest vs SIRS 2/2 possible malignancy   -UA w/ 11-25 WBC, trace LE, occasional bacteria  -COVID/RVP negative at [facility] negative  -CXR at [facility] revealing R pulm congestion, no edema   -s/p vanc/zosyn x 1 at  ED  -c/w zosyn for now  -f/u BCx, UCx from [facility]  -check sputum cx  -HOB >30, aspiration precautions  -speech and swallow in AM.      Problem/Plan - 3:  ·  Problem: Urinary tract infection.  Plan: UA mildly positive w/ 11-25 WBC, trace LE, occasional bacteria. Dysuria ongoing x 1 year per patient   -c/w abx as above  -f/u UCx.      Problem/Plan - 4:  ·  Problem: Respiratory distress.  Plan: Patient tachypneic on presentation to [facility], requiring 2L NC. No hypercapnia on VBG   -likely aspiration PNA given tracheal secretions on CTA w/o focal consolidation, less likely PE as CTA chest neg. ddx also includes fluid overload iso AS and 1L NS  -bipap for inc wob.      Problem/Plan - 5:  ·  Problem: R/O Malignancy involving leukocytes.  Plan: Patient w/ baseline leukocytosis 13-14 since 2019 (in [place]), w/ blasts seen on outpatient w/u c/f possible leukemia vs. MDS   -10% blasts noted during this admission, elevated protein gap as noted   -CTA C/A/P revealing eccentric thickening of distal eso, further w/o to r/o malignancy recommended  -heme/onc consult  -needs eventual GI consult.      Problem/Plan - 6:  Problem: Bicytopenia. Plan: Normocytic anemia (bl 11-12), thrombocytopenia (bl plt 75, recent downtrend to 35 as outpatient)  -keep active T&S  -heme onc consult in AM  -transfuse Hb <7, transfuse plt <15 if febrile.     Problem/Plan - 7:  ·  Problem: Heart failure with preserved ejection fraction.  Plan: Patient noted to be wheezing on exam.  -hold lasix for now  - clinically euvolemic  - TTE.      Problem/Plan - 8:  ·  Problem: Severe aortic stenosis.  Plan: Was pending TAVR as outpatient  -hold further fluids.      Problem/Plan - 9:  ·  Problem: Type 2 diabetes mellitus.  Plan: Newly diagnosed, on 0.5 repaglinide but unclear if patient compliant on this   -check A1C   -low ISS.      Problem/Plan - 10:  Problem: Prophylactic measure. Plan; DVT: SCD iso thrombocytopenia  Diet: Soft/Halal/DASC/CC  Dispo: likely home  Code: full per family, consider palliative consult.

## 2021-06-08 NOTE — PROGRESS NOTE ADULT - ASSESSMENT
85f with a limited hx until recently.  She has htn, oa, more recent dm.  She has had leukocytosis over the past year, which has been unexplained.  She had a compression fx in ~2019 in [place], and there was suspicion for mets at that time and an extensive eval was done then, apparently inconclusive. She had cardiac cath in [place] for unclear reasons in 2019, and apparently was discovered to have an occluded and collateralized rca, with severe disease of the lad and om.  She was medically managed at that time, for unclear reasons-the pt is not aware of having had the test, though the daughter states she is not demented.    More recently she has had more medical care because of htn and slurred speech.  A series of evaluations was done, including a tte which revealed severe low gradient AS (gradients not available).  She was being considered for cabg/avr and pci/tavr, and more recently the latter was being pursued.  Over the past 2 months she has had progressive mcdermott and over the past 2 weeks she has had diffuse body aches as well as some more localized chest discomfort, radiating to the back at times.  She was noted to have progressive thrombocytopenia, with her platelet count dropping from ~100 to ~25, and with progressive anemia as well.  ntg sl was prescribed 2d ago, taken once.  She has been taking asa and plavix.      She was brought to [place] by family for the platelet count, but were sent home given that she was not bleeding. Brought to er by her daughter.    In the er, she was febrile to 101, tachycardic. Given her complex cardiac and heme situation she was transferred overnight and is now on floor    -now with acute hypoxic resp failure, ? aspiration pna, pulmonary edema, hypotension, likely multifactorial, cardiogenic and septic  -bedside echo reportedly with EF 10%  -repeat echocardiogram today  -continue mechanical ventilation  -not making urine despite Lasix  -continue norepi for bp support.  bp has stabilized  -hold off of inotropic support for now  -may consider giving blood. enzyme trend not consistent with true type 1 Mi as primary process driving decompenation.  rather, more likely demand ischemia in a patient with known significant structural heart disease  -can consider giving PRBC to optimize hemoglobin, but need to be careful with volume as she is not making urine and at risk for worsening volume overload  -d/c bb given hypotension  -known severe multivessel cad  -had been on dapt despite low platelets, and given the platelet count, this was discontinued   -noting a degree of reported hemoptysis, and the throbocytopenia, we cannot resume asa/plavix, despite the rising trops  - her HS troponins appear to be dynamic. please trend full set of Radha  -no heparin   -given her apparent bone marrow issue, she cannot be considered a viable candidate for intervention even in the acute setting  - ctsx eval noted. she has previously refused CABG/SAVR. At this time she is not a candidate. I dont believe structural heart will be able to provide any additional options given her hematological issues.   - cont abx  -fever may be infectious or related to malignancy  - heme fu    -continue statin    - now with elevated lactate, hyponatremia, acidosis.  Overall prognosis is extremely poor    -monitor for arrhythmia  -echo    - Further cardiac workup will depend on clinical course.     Upon my evaluation, this patient is at high risk for imminent or life threatening deterioration due to resp failure, hypotension, lactic acidosis  and other active medical issues which require my direct attention, intervention, and personal management.  I have personally spent >35 minutes  of critical care time exclusive of time spent on separate billing procedures. This includes review of laboratory data, radiology results, discussion with primary team, and monitoring for potential decompensation Interventions were performed as documented above.

## 2021-06-08 NOTE — CHART NOTE - NSCHARTNOTEFT_GEN_A_CORE
DEATH NOTE    Called to bedside to evaluate the patient for asystole on monitor.     On physical exam, patient did not respond to verbal or noxious stimuli.  No spontaneous respirations.  Absent heart and breath sounds.  Absent radial and carotid pulses.   Pupils are fixed and dilated, no corneal reflex.  EKG rhythm strip shows asystole.   Patient pronounced dead at 7:29am. Attending notified.  Family notified.  Family ____ autopsy. DEATH NOTE    Called to bedside to evaluate the patient for asystole on monitor.     On physical exam, patient did not respond to verbal or noxious stimuli.  No spontaneous respirations.  Absent heart and breath sounds.  Absent radial and carotid pulses.   Pupils are fixed and dilated, no corneal reflex.  EKG rhythm strip shows asystole.   Patient pronounced dead at 7:29am. Attending notified.  Family notified.  Family declines autopsy.

## 2021-06-08 NOTE — PROGRESS NOTE ADULT - SUBJECTIVE AND OBJECTIVE BOX
[facility] - Som Motta, Deepti, Trinidad, Zaid Salguero Savella  Office Number:  [phone]    Patient resting comfortably in bed in NAD.  She decompensated late yesterday into the AM with increased work of breathing, hypoxic resp failure, and hypotension.  Extensive goals of care discussions had with family, and decision made to proceed with full code.  Patient intubated, and now on IV pressor support.  She is not making any  urine except for 30 cc drained with Calderon placed.  Bedside echo done showing EF of 10%.  Bp stable on norepi.  Patient troponin trending up, and EKG with JUANA in aVR.    F/U for:  Acute hypoxic resp failure, hypotension    Telemetry:  NSR    MEDICATIONS  (STANDING):  allopurinol 300 milliGRAM(s) Oral every 12 hours  artificial tears (preservative free) Ophthalmic Solution 1 Drop(s) Both EYES two times a day  atorvastatin 80 milliGRAM(s) Oral at bedtime  chlorhexidine 0.12% Liquid 15 milliLiter(s) Oral Mucosa every 12 hours  chlorhexidine 4% Liquid 1 Application(s) Topical <User Schedule>  dextrose 40% Gel 15 Gram(s) Oral once  dextrose 50% Injectable 25 Gram(s) IV Push once  dextrose 50% Injectable 12.5 Gram(s) IV Push once  dextrose 50% Injectable 25 Gram(s) IV Push once  glucagon  Injectable 1 milliGRAM(s) IntraMuscular once  insulin glargine Injectable (LANTUS) 14 Unit(s) SubCutaneous at bedtime  insulin lispro (ADMELOG) corrective regimen sliding scale   SubCutaneous three times a day before meals  insulin lispro (ADMELOG) corrective regimen sliding scale   SubCutaneous at bedtime  metoprolol tartrate 25 milliGRAM(s) Oral two times a day  norepinephrine Infusion 0.3 MICROgram(s)/kG/Min (38.8 mL/Hr) IV Continuous <Continuous>  pantoprazole  Injectable 40 milliGRAM(s) IV Push two times a day  piperacillin/tazobactam IVPB.. 3.375 Gram(s) IV Intermittent every 8 hours  polyethylene glycol 3350 17 Gram(s) Oral daily  propofol Infusion 40.097 MICROgram(s)/kG/Min (16.6 mL/Hr) IV Continuous <Continuous>  senna 2 Tablet(s) Oral at bedtime        Allergies    No Known Allergies          Vital Signs Last 24 Hrs  T(C): 36.5 (08 Jun 2021 04:00), Max: 39.6 (07 Jun 2021 15:15)  T(F): 97.7 (08 Jun 2021 04:00), Max: 103.2 (07 Jun 2021 15:15)  HR: 81 (08 Jun 2021 06:45) (77 - 133)  BP: 117/55 (08 Jun 2021 06:45) (85/50 - 138/60)  BP(mean): 74 (08 Jun 2021 06:45) (64 - 84)  RR: 31 (08 Jun 2021 06:45) (18 - 40)  SpO2: 97% (08 Jun 2021 06:45) (94% - 100%)    I&O's Summary    07 Jun 2021 07:01  -  08 Jun 2021 07:00  --------------------------------------------------------  IN: 541.6 mL / OUT: 0 mL / NET: 541.6 mL        ON EXAM:    General: NAD, intubated, sedated  HEENT: Mucous membranes are dry, ETT in place  Lungs: Non-labored, breath sounds are clear bilaterally, No wheezing, rales or rhonchi. Coarse b/l  Cardiovascular: Regular, S1 and S2, 2/6 systolic murmur  Gastrointestinal: Bowel Sounds present, soft  Lymph: No peripheral edema. No lymphadenopathy.  Skin: No rashes or ulcers  Psych:  Unable to properly assess    LABS: All Labs Reviewed:                        7.3    37.05 )-----------( 22       ( 08 Jun 2021 05:56 )             24.7                         7.5    40.49 )-----------( 27       ( 08 Jun 2021 00:30 )             24.9                         8.1    53.04 )-----------( 34       ( 07 Jun 2021 18:25 )             27.6     08 Jun 2021 05:56    135    |  88     |  40     ----------------------------<  74     5.4     |  <10    |  2.47   08 Jun 2021 00:29    134    |  88     |  36     ----------------------------<  166    4.7     |  11     |  2.11   07 Jun 2021 18:25    130    |  91     |  30     ----------------------------<  294    4.6     |  <10    |  1.43     Ca    8.7        08 Jun 2021 05:56  Ca    9.1        08 Jun 2021 00:29  Ca    9.2        07 Jun 2021 18:25  Phos  12.1      08 Jun 2021 05:56  Phos  10.0      08 Jun 2021 00:29  Phos  6.8       07 Jun 2021 18:25  Mg     3.2       08 Jun 2021 05:56  Mg     3.0       08 Jun 2021 00:29  Mg     2.8       07 Jun 2021 18:25    TPro  7.9    /  Alb  2.6    /  TBili  2.2    /  DBili  x      /  AST  673    /  ALT  155    /  AlkPhos  87     08 Jun 2021 05:56  TPro  8.2    /  Alb  2.9    /  TBili  2.1    /  DBili  x      /  AST  207    /  ALT  65     /  AlkPhos  92     08 Jun 2021 00:29  TPro  9.1    /  Alb  3.2    /  TBili  1.8    /  DBili  x      /  AST  58     /  ALT  25     /  AlkPhos  98     07 Jun 2021 18:25    PT/INR - ( 07 Jun 2021 06:19 )   PT: 16.6 sec;   INR: 1.41 ratio         PTT - ( 07 Jun 2021 06:19 )  PTT:29.6 sec  CARDIAC MARKERS ( 07 Jun 2021 18:25 )  x     / x     / x     / x     / 8.7 ng/mL  CARDIAC MARKERS ( 07 Jun 2021 06:20 )  x     / x     / 132 U/L / x     / 5.7 ng/mL  CARDIAC MARKERS ( 06 Jun 2021 22:48 )  x     / x     / 218 U/L / x     / 10.3 ng/mL  CARDIAC MARKERS ( 06 Jun 2021 14:00 )  x     / x     / 222 U/L / x     / 17.9 ng/mL      Blood Culture: Organism --  Gram Stain Blood -- Gram Stain   No polymorphonuclear leukocytes per low power field  Rare Squamous epithelial cells per low power field  No organisms seen per oil power field  Specimen Source .Sputum Sputum  Culture-Blood --    Organism --  Gram Stain Blood -- Gram Stain --  Specimen Source .Blood Blood-Peripheral  Culture-Blood --    Organism --  Gram Stain Blood -- Gram Stain --  Specimen Source .Urine Clean Catch (Midstream)  Culture-Blood --      06-05 @ 16:47  Pro Bnp [id number]    06-06 @ 02:49  TSH: 1.97

## 2021-06-08 NOTE — CHART NOTE - NSCHARTNOTESELECT_GEN_ALL_CORE
Event Note
MICU accept/Event Note
POCUS/Event Note
Rapid Response
Death Note/Event Note
Structural Heart Team/Event Note

## 2021-06-08 NOTE — PROGRESS NOTE ADULT - SUBJECTIVE AND OBJECTIVE BOX
Patient is a 85y old  Female who presents with a chief complaint of chest pain (2021 17:43)      24 hour events: {Pt admitted to the MICU overnight.    REVIEW OF SYSTEMS:  Constitutional: [ ] fevers [ ] chills [ ] weight loss [ ] weight gain  HEENT: [ ] dry eyes [ ] eye irritation [ ] postnasal drip [ ] nasal congestion  CV: [ ] chest pain [ ] orthopnea [ ] palpitations [ ] murmur  Resp: [ ] cough [ ] shortness of breath [ ] dyspnea [ ] wheezing [ ] sputum [ ] hemoptysis  GI: [ ] nausea [ ] vomiting [ ] diarrhea [ ] constipation [ ] abd pain [ ] dysphagia   : [ ] dysuria [ ] nocturia [ ] hematuria [ ] increased urinary frequency  Musculoskeletal: [ ] back pain [ ] myalgias [ ] arthralgias [ ] fracture  Skin: [ ] rash [ ] itch  Neurological: [ ] headache [ ] dizziness [ ] syncope [ ] weakness [ ] numbness  Psychiatric: [ ] anxiety [ ] depression  Endocrine: [ ] diabetes [ ] thyroid problem  Hematologic/Lymphatic: [ ] anemia [ ] bleeding problem  Allergic/Immunologic: [ ] itchy eyes [ ] nasal discharge [ ] hives [ ] angioedema  [ ] All other systems negative  [X ] Unable to assess ROS because pt intubated and sedated    OBJECTIVE:  ICU Vital Signs Last 24 Hrs  T(C): 36.5 (2021 04:00), Max: 39.6 (2021 15:15)  T(F): 97.7 (2021 04:00), Max: 103.2 (2021 15:15)  HR: 78 (2021 06:30) (77 - 133)  BP: 114/53 (2021 06:30) (85/50 - 138/60)  BP(mean): 77 (2021 06:30) (64 - 84)  ABP: --  ABP(mean): --  RR: 32 (2021 06:30) (18 - 40)  SpO2: 97% (2021 06:30) (94% - 100%)    Mode: AC/ CMV (Assist Control/ Continuous Mandatory Ventilation), RR (machine): 24, TV (machine): 400, FiO2: 50, PEEP: 5, ITime: 1, MAP: 11, PIP: 22     @ 07: @ 07:00  --------------------------------------------------------  IN: 720 mL / OUT: 400 mL / NET: 320 mL     @ 07: @ 06:36  --------------------------------------------------------  IN: 338.4 mL / OUT: 0 mL / NET: 338.4 mL      CAPILLARY BLOOD GLUCOSE      POCT Blood Glucose.: 218 mg/dL (2021 18:56)      PHYSICAL EXAM:      LINES:    HOSPITAL MEDICATIONS:  MEDICATIONS  (STANDING):  allopurinol 300 milliGRAM(s) Oral every 12 hours  artificial tears (preservative free) Ophthalmic Solution 1 Drop(s) Both EYES two times a day  atorvastatin 80 milliGRAM(s) Oral at bedtime  chlorhexidine 0.12% Liquid 15 milliLiter(s) Oral Mucosa every 12 hours  chlorhexidine 4% Liquid 1 Application(s) Topical <User Schedule>  dextrose 40% Gel 15 Gram(s) Oral once  dextrose 50% Injectable 25 Gram(s) IV Push once  dextrose 50% Injectable 12.5 Gram(s) IV Push once  dextrose 50% Injectable 25 Gram(s) IV Push once  glucagon  Injectable 1 milliGRAM(s) IntraMuscular once  insulin glargine Injectable (LANTUS) 14 Unit(s) SubCutaneous at bedtime  insulin lispro (ADMELOG) corrective regimen sliding scale   SubCutaneous three times a day before meals  insulin lispro (ADMELOG) corrective regimen sliding scale   SubCutaneous at bedtime  metoprolol tartrate 25 milliGRAM(s) Oral two times a day  norepinephrine Infusion 0.3 MICROgram(s)/kG/Min (38.8 mL/Hr) IV Continuous <Continuous>  pantoprazole  Injectable 40 milliGRAM(s) IV Push two times a day  piperacillin/tazobactam IVPB.. 3.375 Gram(s) IV Intermittent every 8 hours  polyethylene glycol 3350 17 Gram(s) Oral daily  propofol Infusion 40.097 MICROgram(s)/kG/Min (16.6 mL/Hr) IV Continuous <Continuous>  senna 2 Tablet(s) Oral at bedtime    MEDICATIONS  (PRN):      LABS:                        7.3    37.05 )-----------( 22       ( 2021 05:56 )             24.7     Hgb Trend: 7.3<--, 7.5<--, 8.1<--, 8.1<--, 7.8<--  06-08    134<L>  |  88<L>  |  36<H>  ----------------------------<  166<H>  4.7   |  11<L>  |  2.11<H>    Ca    9.1      2021 00:29  Phos  10.0     06-08  Mg     3.0     06-08    TPro  8.2  /  Alb  2.9<L>  /  TBili  2.1<H>  /  DBili  x   /  AST  207<H>  /  ALT  65<H>  /  AlkPhos  92  -08    Creatinine Trend: 2.11<--, 1.43<--, 1.06<--, 0.64<--, 0.91<--  PT/INR - ( 2021 06:19 )   PT: 16.6 sec;   INR: 1.41 ratio         PTT - ( 2021 06:19 )  PTT:29.6 sec  Urinalysis Basic - ( 2021 05:56 )    Color: Yellow / Appearance: Slightly Turbid / S.026 / pH: x  Gluc: x / Ketone: Trace  / Bili: Negative / Urobili: Negative   Blood: x / Protein: 100 mg/dL / Nitrite: Negative   Leuk Esterase: Negative / RBC: 0-2 /hpf / WBC 0-2   Sq Epi: x / Non Sq Epi: 0 /hpf / Bacteria: Negative      Arterial Blood Gas:   @ 05:51  7.01/23/118/6/95/-23.8  ABG lactate: --  Arterial Blood Gas:   @ 21:13  7.14/31/161/10/98/-17.3  ABG lactate: --    Venous Blood Gas:   @ 17:32  7.06/31/53//  VBG Lactate: 18.0  Venous Blood Gas:   @ 06:24  7.42/35/33//58  VBG Lactate: 4.6      EKG: new STEMI in AVR

## 2021-06-08 NOTE — DISCHARGE NOTE FOR THE EXPIRED PATIENT - HOSPITAL COURSE
86 yo Welsh speaking female with a PMHx of CAD w/ TVD (cardiac cath 2019 in [place] revealed LAD 70-80%, OM1 80%,  RCA, family declined CABG at that time), HFpEF, severe AS, HTN, recently dx T2DM (A1C >9), chronic leukocytosis (WBC 13-14 in [place] in ), anemia/thrombocytopenia acutely worsening recently (plt 70s-->20s) w/ blasts, and GERD presenting as transfer from [facility] where she was seen for 5 days of substernal cp that radiates to left jaw, left arm and back w/ associated progressive SANON x 5 days. Found to be septic w/ Tmax 101, WBC 18, 7% bands, 10% blasts, lactate 4.7 --> 3, sinus tach 110 w/ PVCs w/ mildly positive UA (11-25 WBC, trace LE, occasional bacteria) and w/ elevated cardiac enzymes (TropI 0.494, CKMB 4.4, BNP 1790) w/o EKG changes c/f NSTEMI vs. demand ischemia. Evaluated by cardiology. Recommendation to hold hep gtt and DAPT due to thrombocytopenia and transfer to tertiary center for higher acuity of care. Also evaluated by critical care, found to have low EF and apical hypokinesis on bedside POCUS. Per son, no hx of cancer in patient or family. Per daughter, flow cytometry checked in [facility] in  was inconclusive. Blasts noted on CBC within the last week only.     While patient was in the hospital, she was seen by cardiology, structural heart (for NSTEMI and aortic stenosis), heme/onc (for evaluation of potential hematologic malignancy), ID (for sepsis). Per cardiology, DAPT that patient was on for CAD was discontinued in light of worsening thrombocytopenia and hemoptysis patient was previously experiencing, despite knowing that pait. Furthermore, for this reason patient was not a cath candidate to to inability to restart DAPT in this setting if patient were to receive AC/DAPT. Per structural heart, it is unlikely that patient would be a candidate for TAVR based on all her active issues pending formal echo. Bone marrow biopsy performed  by heme/onc. ID following for sepsis.     MICU consulted for patient's worsening respiratory failure this afternoon. Patient was on BiPAP on arrival from [place] yesterday but was weaned to 2L NC. This afternoon patient developed increasing WOB and respiratory distress that persisted despite BiPAP. Patient was found to have a worsening WBC count to 59, worsening MARINA ( 1.4 <- 1.0 <- 0.6 ), and significant lactic acidosis with bicarb <10 and lactate of 18. CXR showed new pulmonary edema with minimal urine output despite lasix. Extensive GOC conversations had with family. Decision was made to proceed with full code status with family understanding that patient is likely to , and patient was started on levophed 2/2 hypotension, intubated for AMS and increased WOB, and brought to MICU.  84 yo Croatian speaking female with a PMHx of CAD w/ TVD (cardiac cath 2019 in [place] revealed LAD 70-80%, OM1 80%,  RCA, family declined CABG at that time), HFpEF, severe AS, HTN, recently dx T2DM (A1C >9), chronic leukocytosis (WBC 13-14 in [place] in ), anemia/thrombocytopenia acutely worsening recently (plt 70s-->20s) w/ blasts, and GERD presenting as transfer from [facility] where she was seen for 5 days of substernal cp that radiates to left jaw, left arm and back w/ associated progressive SANON x 5 days. Found to be septic w/ Tmax 101, WBC 18, 7% bands, 10% blasts, lactate 4.7 --> 3, sinus tach 110 w/ PVCs w/ mildly positive UA (11-25 WBC, trace LE, occasional bacteria) and w/ elevated cardiac enzymes (TropI 0.494, CKMB 4.4, BNP 1790) w/o EKG changes c/f NSTEMI vs. demand ischemia. Evaluated by cardiology. Recommendation to hold hep gtt and DAPT due to thrombocytopenia and transfer to tertiary center for higher acuity of care. Also evaluated by critical care, found to have low EF and apical hypokinesis on bedside POCUS. Per son, no hx of cancer in patient or family. Per daughter, flow cytometry checked in [facility] in  was inconclusive. Blasts noted on CBC within the last week only.     While at [facility], pt was seen by cardiology, structural heart (for NSTEMI and aortic stenosis), heme/onc (for evaluation of potential hematologic malignancy), ID (for sepsis). Per cardiology, DAPT that patient was on for CAD was discontinued in light of worsening thrombocytopenia and hemoptysis patient was previously experiencing, despite knowing that pait. Furthermore, for this reason patient was not a cath candidate to to inability to restart DAPT in this setting if patient were to receive AC/DAPT. Per structural heart, it is unlikely that patient would be a candidate for TAVR based on all her active issues pending formal echo. Bone marrow biopsy performed  by heme/onc. ID following for sepsis.     Patient's worsening respiratory failure led to transfer to MICU. Patient developed increasing WOB and respiratory distress that persisted despite BiPAP. Patient was found to have a worsening WBC count to 59, worsening MARINA ( 1.4 <- 1.0 <- 0.6 ), and significant lactic acidosis with bicarb <10 and lactate of 18. CXR showed new pulmonary edema with minimal urine output despite lasix. Extensive GOC conversations had with family. Decision was made to proceed with full code status with family understanding that patient is likely to , and patient was started on levophed 2/2 hypotension, intubated for AMS and increased WOB. While in the MICU, pt also developed new STEMI with JUANA in AVR. Per cardiology, pt still not a candidate for catherization. Pt also found to have worsening acidemia with pH 7.01. Pt found to be in asystole on monitor and pronounced at 7:29am on 21.

## 2021-06-09 RX ORDER — FAMOTIDINE 10 MG/ML
1 INJECTION INTRAVENOUS
Qty: 0 | Refills: 0 | DISCHARGE

## 2021-06-09 RX ORDER — REPAGLINIDE 1 MG/1
1 TABLET ORAL
Qty: 0 | Refills: 0 | DISCHARGE

## 2021-06-09 RX ORDER — ATORVASTATIN CALCIUM 80 MG/1
1 TABLET, FILM COATED ORAL
Qty: 0 | Refills: 0 | DISCHARGE

## 2021-06-09 RX ORDER — CLOPIDOGREL BISULFATE 75 MG/1
1 TABLET, FILM COATED ORAL
Qty: 0 | Refills: 0 | DISCHARGE

## 2021-06-09 RX ORDER — NITROGLYCERIN 6.5 MG
1 CAPSULE, EXTENDED RELEASE ORAL
Qty: 0 | Refills: 0 | DISCHARGE

## 2021-06-09 RX ORDER — METOPROLOL TARTRATE 50 MG
1 TABLET ORAL
Qty: 0 | Refills: 0 | DISCHARGE

## 2021-06-09 RX ORDER — FUROSEMIDE 40 MG
2 TABLET ORAL
Qty: 0 | Refills: 0 | DISCHARGE

## 2021-06-09 RX ORDER — ASPIRIN/CALCIUM CARB/MAGNESIUM 324 MG
1 TABLET ORAL
Qty: 0 | Refills: 0 | DISCHARGE

## 2021-06-10 LAB — G6PD RBC-CCNC: 17 U/G HGB — SIGNIFICANT CHANGE UP (ref 7–20.5)

## 2021-06-11 LAB
CULTURE RESULTS: SIGNIFICANT CHANGE UP
CULTURE RESULTS: SIGNIFICANT CHANGE UP
SPECIMEN SOURCE: SIGNIFICANT CHANGE UP
SPECIMEN SOURCE: SIGNIFICANT CHANGE UP

## 2021-09-03 NOTE — PATIENT PROFILE ADULT - DO YOU NEED ADDITIONAL SERVICES TO MANAGE ANY OF THESE MEDICAL CONDITIONS AT HOME?
Patient is in the supine position.   The body was positioned using the following devices: safety strap and blanket.  The head was positioned using the following devices: regular pillow.  The left arm was positioned using the following devices: arm board, gel arm pads and blanket.  The right arm was positioned using the following devices: arm board, gel arm pads and blanket.  The left leg was positioned using the following devices: blanket.  The right leg was positioned using the following devices: blanket.  The patient was positioned by Gayathri Barber RN, Vita Riveruhm  yes

## 2021-10-11 NOTE — DISCHARGE NOTE ADULT - FUNCTIONAL SCREEN CURRENT LEVEL: DRESSING, MLM
Please have blood work done in the next 1-2 weeks - no food after midnight, no appointment necessary.  We are open Monday-Saturday starting at 7am.       (2) assistive person General

## 2022-05-18 NOTE — H&P ADULT - PROBLEM SELECTOR PROBLEM 5
Patient was seen by Dr. Edvin Grajeda. Perioperative anticoagulant will be handled by Dr. Edvin Grajeda. I we will check with Dr. Sulaiman Meyers to make sure that preoperative testing is completed including cardiac testing. Patient was able to walk from her car to the exam room without chest pain, chest pressure, shortness of breath. She did not become diaphoretic and there was no nausea or vomiting. The patient's blood sugars are poorly controlled. She does see endocrinology and I will speak to them regarding her noncompliance. I told her that we would need to make sure that her blood sugars are under better control in the perioperative period. The patient blood sugar this morning was over 300. I will discuss this also with Dr. Sulaiman Meyers.       Patient Active Problem List   Diagnosis    Neuropathy    Osteoarthritis    Mixed hyperlipidemia    Type 2 diabetes mellitus with stage 3b chronic kidney disease, with long-term current use of insulin (HCC)    Severe obesity (BMI 35.0-35.9 with comorbidity) ([address]    History of endometrial cancer    Essential hypertension    Anxiety    Type 2 diabetes mellitus with diabetic neuropathy [facility]    Spinal stenosis of lumbar region with neurogenic claudication    Steatosis of liver    Pancreatic cyst    Peripheral vestibulopathy of both ears    Recurrent falls    Type 2 diabetes mellitus with hyperglycemia    Abnormal Papanicolaou smear of vagina    Malignant neoplasm of corpus uteri, except isthmus (HCC)    Pulmonary nodules    Vasculitis of mesenteric artery (HCC)    Bilateral pulmonary embolism (HCC)    Vitamin D deficiency    Paroxysmal supraventricular tachycardia (HCC)    Rectal cancer (HCC)       No Known Allergies       Katlyn Benson   Home Medication Instructions BEE:    Printed on:07/27/21 1306   Medication Information                      amitriptyline (ELAVIL) 25 MG tablet  TAKE 1 TABLET AT BEDTIME             atorvastatin (LIPITOR) 80 MG tablet  TAKE 1 TABLET DAILY             Continuous Blood Gluc Sensor (FREESTYLE OTTO 2 SENSOR) MISC  Change sensor every 14 days             HUMALOG KWIKPEN 100 UNIT/ML SOPN               insulin aspart (NOVOLOG FLEXPEN) 100 UNIT/ML injection pen  20 units before meals plus a scale. A max of 100 units             insulin glargine (LANTUS SOLOSTAR) 100 UNIT/ML injection pen  INJECT 55 UNITS DAILY IN AM             Insulin Pen Needle 32G X 5 MM MISC  1 each by Does not apply route 4 times daily             meclizine (ANTIVERT) 25 MG tablet               metoprolol succinate (TOPROL XL) 50 MG extended release tablet  TAKE 1 TABLET DAILY             PARoxetine (PAXIL) 20 MG tablet  TAKE 1 TABLET DAILY             vitamin D (ERGOCALCIFEROL) 1.25 MG (32840 UT) CAPS capsule  Take 1 capsule by mouth once a week                   Medications marked \"taking\" at this time  Outpatient Medications Marked as Taking for the 5/18/22 encounter (Office Visit) with Kyra Alva MD   Medication Sig Dispense Refill    OLANZapine (ZYPREXA) 2.5 MG tablet Take 2.5 mg by mouth nightly      ondansetron (ZOFRAN) 8 MG tablet Take 8 mg by mouth every 8 hours as needed for Nausea or Vomiting      nadolol (CORGARD) 20 MG tablet Take 1 tablet by mouth daily 90 tablet 2    ibandronate (BONIVA) 150 MG tablet TAKE AS INSTRUCTED BY YOUR PRESCRIBER 12 tablet 3    apixaban (ELIQUIS) 5 MG TABS tablet Take 1 tablet by mouth 2 times daily Start after finishing 10mg twice daily 180 tablet 0    HUMALOG KWIKPEN 100 UNIT/ML SOPN Inject 27 units with meals +SS, max daily dose 130 (Patient taking differently: Inject 30 units with meals +SS, max daily dose 130) 15 pen 11    Elastic Bandages & Supports (FUTURO FIRM COMPRESSION HOSE) MISC 2 each by Does not apply route daily Bilateral compression hose 2 each 1    pregabalin (LYRICA) 100 MG capsule Take 100 mg by mouth 2 times daily.       insulin glargine (LANTUS SOLOSTAR) 100 UNIT/ML injection pen Inject 20 units in the morning and 40 units at night (Patient taking differently: Inject 30 units in the morning and 48 units at night) 5 pen 5    Insulin Pen Needle (BD PEN NEEDLE MICRO U/F) 32G X 6 MM MISC Uses with insulin 4 times a day 250 each 5    vitamin D (ERGOCALCIFEROL) 1.25 MG (27507 UT) CAPS capsule Take 1 capsule by mouth once a week *SUNDAYS* 12 capsule 1    glucagon 1 MG injection Inject 1 mg into the muscle See Admin Instructions Follow package directions for low blood sugar. 1 kit 3    amitriptyline (ELAVIL) 25 MG tablet Take 25 mg by mouth nightly      atorvastatin (LIPITOR) 80 MG tablet Take 80 mg by mouth daily          Medications patient taking as of now reconciled against medications ordered at time of hospital discharge: Yes    Chief Complaint   Patient presents with    Hypertension     4 months         Vitals:    05/18/22 1300   BP: 110/64   Pulse: 71   Temp: 97.5 °F (36.4 °C)   TempSrc: Temporal   SpO2: 97%   Weight: 224 lb 6.4 oz (101.8 kg)   Height: 5' 4\" (1.626 m)     Body mass index is 38.52 kg/m². Wt Readings from Last 3 Encounters:   05/18/22 224 lb 6.4 oz (101.8 kg)   04/18/22 227 lb (103 kg)   02/16/22 221 lb (100.2 kg)     BP Readings from Last 3 Encounters:   05/18/22 110/64   04/18/22 131/68   02/16/22 120/60        Physical Exam:  Tympanic membranes are clear bilaterally. No anterior or posterior cervical adenopathy. The lungs are clear to auscultation. Heart is regular rate and rhythm 2/6 systolic ejection murmur which is unchanged compared to the past.  No carotid bruits. Patient is not even able to feel light touch below the knee bilaterally. Trace edema bilaterally of the lower extremity below the mid tibia. No pitting. aMry Jane Jackson was seen today for hypertension. Diagnoses and all orders for this visit:    Rectal cancer [facility])    Essential hypertension    Bilateral pulmonary embolism (HCC)  -     apixaban (ELIQUIS) 5 MG TABS tablet;  Take 1 tablet by mouth 2 times daily Start after finishing 10mg twice daily    Type 2 diabetes mellitus with diabetic neuropathy, with long-term current use of insulin (HCC)    Neuropathy    Primary osteoarthritis involving multiple joints    Other orders  -     nadolol (CORGARD) 20 MG tablet; Take 1 tablet by mouth daily  -     ibandronate (BONIVA) 150 MG tablet; TAKE AS INSTRUCTED BY YOUR PRESCRIBER  -     Discontinue: apixaban (ELIQUIS) 5 MG TABS tablet; Take 1 tablet by mouth 2 times daily Start after finishing 10mg twice daily    Will speak to the colorectal surgeon and the endocrinologist regarding perioperative assessment. I will make sure that the patient is optimized to go to surgery. Part of the problem is her dementia and her noncompliance. Review of the chart, review of previous notes from the [facility], speaking to the colorectal surgeon, and speaking to the endocrinologist all exceeded 50 minutes. Bicytopenia R/O Malignancy involving leukocytes

## 2022-11-05 NOTE — PROGRESS NOTE ADULT - ATTENDING COMMENTS
Please follow-up with your primary care provider. Continue to take Aleve and acetaminophen alternating to help with your aches and fever. Please follow-up with your primary care provider.
patient seen and examined by me with the resident and team. Agree with the resident findings assessment and plan as documented unless noted.  CT chest film personally visualized - likely right sided infiltrate.   tele strip personally reviewed   Acute Viral syndrome with influenza B / acute hypoxic respiratory failure / sepsis secondary to  Pneumonia- off BipAP on nasal cannula   continue to monitor on tele and off oxygenation   monitor CBC and platelets  PT  d/w patient son for length.
D/C planning today.

## 2024-03-06 NOTE — ED ADULT NURSE NOTE - ATTEMPT TO OOB
CC Chart from Laurence Chun, [facility]  Rigoberto Taylor RN  Caller: Unspecified (Yesterday, 12:48 PM)  I am not in clinic until Thursday - if you are able to help coordinate with Dr. Danika Hopson that would be great! Thank you!     Previous Messages     ----- Message -----  From: Adriana Plasencia  Sent: 3/5/2024  12:54 PM CST  To: Dayana Hopson MD; *    - Sending hard copy scripts to [facility] interoffice mail 03/06/2024 @ 1:19 PM.     Rigoberto Taylor, RN on 3/6/2024 at 1:18 PM    no

## 2024-04-30 NOTE — ED PROCEDURE NOTE - CPROC ED INFORMED CONSENT1
Individual Psychotherapy (PhD/LCSW)  Hyun Emanuel LPC  4/30/2024  MRN: [id number]   EAP 1 of 5   Site:  [place]    Diagnosis Deferred  Clinician has clinical notes       
Benefits, risks, and possible complications of procedure explained to patient/caregiver who verbalized understanding and gave verbal consent.

## 2025-01-18 NOTE — ED PROVIDER NOTE - CRITICAL CARE PROVIDED
----- Message from AD King sent at 1/18/2025 11:50 AM CST -----  + vaginal yeast swab;  Treatment per Barbara's note;  Diflucan 150 mg every 3 days x 3, then once per week x 4 weeks.  (#7)  Please call to patient's pharmacy.    additional history taking/direct patient care (not related to procedure)/interpretation of diagnostic studies/consultation with other physicians/conducted a detailed discussion of DNR status/documentation

## 2025-06-30 NOTE — ED PROVIDER NOTE - IV ALTEPLASE DOOR HIDDEN
[facility] - Periop Services  Discharge Note  Short Stay    Procedure(s) (LRB):  INJECTION, SPINE, LUMBOSACRAL, TRANSFORAMINAL APPROACH / TFESI Left L5 & S1 (Left)      OUTCOME: Patient tolerated treatment/procedure well without complication and is now ready for discharge.    DISPOSITION: Home or Self Care    FINAL DIAGNOSIS:  <principal problem not specified>    FOLLOWUP: In clinic    DISCHARGE INSTRUCTIONS:  No discharge procedures on file.     TIME SPENT ON DISCHARGE: 5 minutes  
show